# Patient Record
Sex: FEMALE | Race: BLACK OR AFRICAN AMERICAN | Employment: OTHER | URBAN - METROPOLITAN AREA
[De-identification: names, ages, dates, MRNs, and addresses within clinical notes are randomized per-mention and may not be internally consistent; named-entity substitution may affect disease eponyms.]

---

## 2017-01-10 ENCOUNTER — OFFICE VISIT (OUTPATIENT)
Dept: FAMILY MEDICINE CLINIC | Age: 67
End: 2017-01-10

## 2017-01-10 VITALS
HEIGHT: 65 IN | OXYGEN SATURATION: 97 % | SYSTOLIC BLOOD PRESSURE: 170 MMHG | DIASTOLIC BLOOD PRESSURE: 90 MMHG | RESPIRATION RATE: 16 BRPM | HEART RATE: 70 BPM | TEMPERATURE: 97.7 F | WEIGHT: 170.13 LBS | BODY MASS INDEX: 28.35 KG/M2

## 2017-01-10 DIAGNOSIS — F41.9 ANXIETY: ICD-10-CM

## 2017-01-10 DIAGNOSIS — I10 ESSENTIAL HYPERTENSION: ICD-10-CM

## 2017-01-10 DIAGNOSIS — Z23 ENCOUNTER FOR IMMUNIZATION: ICD-10-CM

## 2017-01-10 DIAGNOSIS — Z00.00 WELL ADULT EXAM: Primary | ICD-10-CM

## 2017-01-10 RX ORDER — ESCITALOPRAM OXALATE 10 MG/1
10 TABLET ORAL DAILY
Qty: 30 TAB | Refills: 5 | Status: SHIPPED | OUTPATIENT
Start: 2017-01-10 | End: 2017-10-19 | Stop reason: SDUPTHER

## 2017-01-10 RX ORDER — AZITHROMYCIN 250 MG/1
TABLET, FILM COATED ORAL
Qty: 6 TAB | Refills: 0 | Status: SHIPPED | OUTPATIENT
Start: 2017-01-10 | End: 2017-01-31 | Stop reason: ALTCHOICE

## 2017-01-10 NOTE — PROGRESS NOTES
1. Have you been to the ER, urgent care clinic since your last visit? Hospitalized since your last visit? No    2. Have you seen or consulted any other health care providers outside of the [address] [place] [address] since your last visit? Include any pap smears or colon screening.  No    Chief Complaint   Patient presents with    Complete Physical    Labs

## 2017-01-10 NOTE — MR AVS SNAPSHOT
Visit Information Date & Time Provider Department Dept. Phone Encounter #  
 1/10/2017 10:00 AM Rodolfo Campbell NP [address] [facility] [phone] [id number] Follow-up Instructions Return in about 3 weeks (around 1/31/2017) for med check. Upcoming Health Maintenance Date Due COLONOSCOPY 9/25/1967 Pneumococcal 65+ Low/Medium Risk (2 of 2 - PCV13) 11/10/2015 GLAUCOMA SCREENING Q2Y 5/14/2016 INFLUENZA AGE 9 TO ADULT 8/1/2016 MEDICARE YEARLY EXAM 12/1/2016 BREAST CANCER SCRN MAMMOGRAM 12/8/2016 DTaP/Tdap/Td series (2 - Td) 11/10/2024 Allergies as of 1/10/2017  Review Complete On: 1/10/2017 By: Carina Hunt LPN Severity Noted Reaction Type Reactions Pcn [Penicillins]  07/02/2010   Side Effect Unknown (comments) Current Immunizations  Reviewed on 12/1/2015 Name Date Influenza High Dose Vaccine PF  Incomplete Influenza Vaccine 11/17/2014 Influenza Vaccine (Quad) PF 12/1/2015 Pneumococcal Polysaccharide (PPSV-23) 11/10/2014 Tdap 11/10/2014 Not reviewed this visit You Were Diagnosed With   
  
 Codes Comments Well adult exam    -  Primary ICD-10-CM: Z00.00 ICD-9-CM: V70.0 Essential hypertension     ICD-10-CM: I10 
ICD-9-CM: 401.9 Anxiety     ICD-10-CM: F41.9 ICD-9-CM: 300.00 Encounter for immunization     ICD-10-CM: O10 ICD-9-CM: V03.89 Vitals BP Pulse Temp Resp Height(growth percentile) Weight(growth percentile) 170/90 70 97.7 °F (36.5 °C) (Oral) 16 5' 5\" (1.651 m) 170 lb 2 oz (77.2 kg) SpO2 BMI OB Status Smoking Status 97% 28.31 kg/m2 Postmenopausal Current Every Day Smoker Vitals History BMI and BSA Data Body Mass Index Body Surface Area  
 28.31 kg/m 2 1.88 m 2 Preferred Pharmacy Pharmacy Name Phone [id number] [address] [phone] Your Updated Medication List  
  
   
 This list is accurate as of: 1/10/17 10:45 AM.  Always use your most recent med list.  
  
  
  
  
 acyclovir 400 mg tablet Commonly known as:  ZOVIRAX TAKE 1 TABLET BY MOUTH THREE (3) TIMES DAILY FOR 5 DAYS. aspirin 325 mg tablet Commonly known as:  ASPIRIN Take 1 Tab by mouth daily. azithromycin 250 mg tablet Commonly known as:  Debera Shaper Take two tablets today then one tablet daily Calcium-Cholecalciferol (D3) 600 mg(1,500mg) -400 unit Cap Take 1 Tab by mouth daily. escitalopram oxalate 10 mg tablet Commonly known as:  Arty Montezuma Take 1 Tab by mouth daily. oxyCODONE-acetaminophen 5-325 mg per tablet Commonly known as:  PERCOCET Take 1 Tab by mouth every four (4) hours as needed for Pain. Max Daily Amount: 6 Tabs. pneumococcal 13 kristopher conj dip 0.5 mL Syrg injection Commonly known as:  PREVNAR-13  
0.5 mL by IntraMUSCular route once for 1 dose. promethazine 25 mg tablet Commonly known as:  PHENERGAN Take 1 Tab by mouth every six (6) hours as needed for Nausea. Prescriptions Printed Refills  
 pneumococcal 13 kristopher conj dip (PREVNAR-13) 0.5 mL syrg injection 0 Si.5 mL by IntraMUSCular route once for 1 dose. Class: Print Route: IntraMUSCular Prescriptions Sent to Pharmacy Refills  
 azithromycin (ZITHROMAX Z-RADHA) 250 mg tablet 0 Sig: Take two tablets today then one tablet daily Class: Normal  
 Pharmacy: [facility] #[id number] Ph #: [phone]  
 escitalopram oxalate (LEXAPRO) 10 mg tablet 5 Sig: Take 1 Tab by mouth daily. Class: Normal  
 Pharmacy: [id number]- [place], [address] [facility] Ph #: [phone] Route: Oral  
  
We Performed the Following ADMIN INFLUENZA VIRUS VAC [ HCPCS] CBC WITH AUTOMATED DIFF [59720 CPT(R)] INFLUENZA VIRUS VACCINE, HIGH DOSE SEASONAL, PRESERVATIVE FREE [68098 CPT(R)] LIPID PANEL [33026 CPT(R)] METABOLIC PANEL, COMPREHENSIVE [62905 CPT(R)] TSH 3RD GENERATION [35529 CPT(R)] Follow-up Instructions Return in about 3 weeks (around 1/31/2017) for med check. Please provide this summary of care documentation to your next provider. Your primary care clinician is listed as Rodney Seay. If you have any questions after today's visit, please call [phone].

## 2017-01-10 NOTE — PROGRESS NOTES
This is a Subsequent Medicare Annual Wellness Visit providing Personalized Prevention Plan Services (PPPS) (Performed 12 months after initial AWV and PPPS )  I have reviewed the patient's medical history in detail and updated the computerized patient record. She is also here for her fasting labs related to her chronic medical conditions  She had a visit with the [facility] nurse and needs to get flu shot and prevnar 13 vaccines to be current  Has been having increase in anxiety and bp also noted to be high      History     Past Medical History   Diagnosis Date    Asthma      in 25s - no longer a problem    Ill-defined condition      Hepatitis C    Liver disease      hep c--had treatment, states no longer present    Nausea & vomiting       Past Surgical History   Procedure Laterality Date    Hx orthopaedic       foot (right)    Vascular surgery procedure unlist       left leg varicose vein stripping     Current Outpatient Prescriptions   Medication Sig Dispense Refill    pneumococcal 13 kristopher conj dip (PREVNAR-13) 0.5 mL syrg injection 0.5 mL by IntraMUSCular route once for 1 dose. 0.5 mL 0    acyclovir (ZOVIRAX) 400 mg tablet TAKE 1 TABLET BY MOUTH THREE (3) TIMES DAILY FOR 5 DAYS. 15 Tab 1    aspirin (ASPIRIN) 325 mg tablet Take 1 Tab by mouth daily. 21 Tab 0    Calcium-Cholecalciferol, D3, 600 mg(1,500mg) -400 unit cap Take 1 Tab by mouth daily.  oxyCODONE-acetaminophen (PERCOCET) 5-325 mg per tablet Take 1 Tab by mouth every four (4) hours as needed for Pain. Max Daily Amount: 6 Tabs. 20 Tab 0    promethazine (PHENERGAN) 25 mg tablet Take 1 Tab by mouth every six (6) hours as needed for Nausea.  30 Tab 0     Allergies   Allergen Reactions    Pcn [Penicillins] Unknown (comments)     Family History   Problem Relation Age of Onset    Elevated Lipids Mother     Cancer Father      LUNG AND THROAT    Parkinson's Disease Father      Social History   Substance Use Topics    Smoking status: Current Every Day Smoker     Packs/day: 0.10    Smokeless tobacco: Never Used      Comment: smokes 3-4 cigarettes per day x 25 years    Alcohol use Yes      Comment: about 5 drinks per week     Patient Active Problem List   Diagnosis Code    Chronic hepatitis C ([facility] B18.2    Osteopenia M85.80    Closed fracture of right patella S82.001A       Depression Risk Factor Screening:     PHQ 2 / 9, over the last two weeks 1/10/2017   Little interest or pleasure in doing things Several days   Feeling down, depressed or hopeless More than half the days   Total Score PHQ 2 3   Trouble falling or staying asleep, or sleeping too much Nearly every day   Feeling tired or having little energy Nearly every day   Poor appetite or overeating Several days   Feeling bad about yourself - or that you are a failure or have let yourself or your family down Nearly every day   Trouble concentrating on things such as school, work, reading or watching TV Nearly every day   Moving or speaking so slowly that other people could have noticed; or the opposite being so fidgety that others notice Not at all   Thoughts of being better off dead, or hurting yourself in some way Not at all   PHQ 9 Score 16   How difficult have these problems made it for you to do your work, take care of your home and get along with others Very difficult     Alcohol Risk Factor Screening: On any occasion during the past 3 months, have you had more than 3 drinks containing alcohol? Yes    Do you average more than 7 drinks per week? Yes      Functional Ability and Level of Safety:     Hearing Loss   none    Activities of Daily Living   Self-care. Requires assistance with: no ADLs    Fall Risk     Fall Risk Assessment, last 12 mths 1/10/2017   Able to walk? Yes   Fall in past 12 months? Yes   Fall with injury?  Yes   Number of falls in past 12 months 1   Fall Risk Score 2     Abuse Screen   Patient is not abused    Review of Systems   A comprehensive review of systems was negative except for that written in the HPI. Physical Examination     Evaluation of Cognitive Function:  Mood/affect:  happy  Appearance: age appropriate  Family member/caregiver input: concerns about memory    Visit Vitals    /90    Pulse 70    Temp 97.7 °F (36.5 °C) (Oral)    Resp 16    Ht 5' 5\" (1.651 m)    Wt 170 lb 2 oz (77.2 kg)    SpO2 97%    BMI 28.31 kg/m2     General appearance: alert, cooperative, no distress, appears stated age  Lungs: clear to auscultation bilaterally  Heart: regular rate and rhythm, S1, S2 normal, no murmur, click, rub or gallop  Extremities: extremities normal, atraumatic, no cyanosis or edema    Patient Care Team:  Kathy Reeder NP as PCP - General (Family Practice)  Grey Wagner LPN as Ambulatory Care Navigator    Advice/Referrals/Counseling   Education and counseling provided:  Are appropriate based on today's review and evaluation  Pneumococcal Vaccine  Influenza Vaccine    Assessment/Plan     Encounter Diagnoses   Name Primary?  Well adult exam Yes    Essential hypertension     Anxiety     Encounter for immunization      Orders Placed This Encounter    Influenza virus vaccine (FLUZONE HIGH-DOSE) 65 years and older    CBC WITH AUTOMATED DIFF    METABOLIC PANEL, COMPREHENSIVE    LIPID PANEL    TSH 3RD GENERATION    pneumococcal 13 kristopher conj dip (PREVNAR-13) 0.5 mL syrg injection    azithromycin (ZITHROMAX Z-RADHA) 250 mg tablet    escitalopram oxalate (LEXAPRO) 10 mg tablet   . Follow-up Disposition:  Return in about 3 weeks (around 1/31/2017) for med check. I have discussed the diagnosis with the patient and the intended plan as seen in the above orders. The patient has received an after-visit summary and questions were answered concerning future plans. Patient conveyed understanding of the plan at the time of the visit.     Kathy Reeder, MSN, ANP  1/10/2017

## 2017-01-11 LAB
ALBUMIN SERPL-MCNC: 4.6 G/DL (ref 3.6–4.8)
ALBUMIN/GLOB SERPL: 1.5 {RATIO} (ref 1.1–2.5)
ALP SERPL-CCNC: 58 IU/L (ref 39–117)
ALT SERPL-CCNC: 13 IU/L (ref 0–32)
AST SERPL-CCNC: 21 IU/L (ref 0–40)
BASOPHILS # BLD AUTO: 0 X10E3/UL (ref 0–0.2)
BASOPHILS NFR BLD AUTO: 0 %
BILIRUB SERPL-MCNC: 0.4 MG/DL (ref 0–1.2)
BUN SERPL-MCNC: 10 MG/DL (ref 8–27)
BUN/CREAT SERPL: 17 (ref 11–26)
CALCIUM SERPL-MCNC: 9.7 MG/DL (ref 8.7–10.3)
CHLORIDE SERPL-SCNC: 101 MMOL/L (ref 96–106)
CHOLEST SERPL-MCNC: 213 MG/DL (ref 100–199)
CO2 SERPL-SCNC: 20 MMOL/L (ref 18–29)
CREAT SERPL-MCNC: 0.58 MG/DL (ref 0.57–1)
EOSINOPHIL # BLD AUTO: 0.4 X10E3/UL (ref 0–0.4)
EOSINOPHIL NFR BLD AUTO: 6 %
ERYTHROCYTE [DISTWIDTH] IN BLOOD BY AUTOMATED COUNT: 13.5 % (ref 12.3–15.4)
GLOBULIN SER CALC-MCNC: 3 G/DL (ref 1.5–4.5)
GLUCOSE SERPL-MCNC: 95 MG/DL (ref 65–99)
HCT VFR BLD AUTO: 43.2 % (ref 34–46.6)
HDLC SERPL-MCNC: 54 MG/DL
HGB BLD-MCNC: 14.1 G/DL (ref 11.1–15.9)
IMM GRANULOCYTES # BLD: 0 X10E3/UL (ref 0–0.1)
IMM GRANULOCYTES NFR BLD: 0 %
INTERPRETATION, 910389: NORMAL
LDLC SERPL CALC-MCNC: 145 MG/DL (ref 0–99)
LYMPHOCYTES # BLD AUTO: 2.1 X10E3/UL (ref 0.7–3.1)
LYMPHOCYTES NFR BLD AUTO: 30 %
MCH RBC QN AUTO: 31.2 PG (ref 26.6–33)
MCHC RBC AUTO-ENTMCNC: 32.6 G/DL (ref 31.5–35.7)
MCV RBC AUTO: 96 FL (ref 79–97)
MONOCYTES # BLD AUTO: 0.5 X10E3/UL (ref 0.1–0.9)
MONOCYTES NFR BLD AUTO: 8 %
NEUTROPHILS # BLD AUTO: 3.9 X10E3/UL (ref 1.4–7)
NEUTROPHILS NFR BLD AUTO: 56 %
PLATELET # BLD AUTO: 180 X10E3/UL (ref 150–379)
POTASSIUM SERPL-SCNC: 5.1 MMOL/L (ref 3.5–5.2)
PROT SERPL-MCNC: 7.6 G/DL (ref 6–8.5)
RBC # BLD AUTO: 4.52 X10E6/UL (ref 3.77–5.28)
SODIUM SERPL-SCNC: 142 MMOL/L (ref 134–144)
TRIGL SERPL-MCNC: 71 MG/DL (ref 0–149)
TSH SERPL DL<=0.005 MIU/L-ACNC: 0.51 UIU/ML (ref 0.45–4.5)
VLDLC SERPL CALC-MCNC: 14 MG/DL (ref 5–40)
WBC # BLD AUTO: 7 X10E3/UL (ref 3.4–10.8)

## 2017-01-15 NOTE — PROGRESS NOTES
Let her know given her heart history she needs to be on a chol med. I will send to pharm on file if agree and recheck 3 mo fasting.  Samuel Laurent

## 2017-01-17 RX ORDER — ATORVASTATIN CALCIUM 20 MG/1
20 TABLET, FILM COATED ORAL DAILY
Qty: 30 TAB | Refills: 5 | Status: SHIPPED | OUTPATIENT
Start: 2017-01-17 | End: 2017-10-19 | Stop reason: SDUPTHER

## 2017-01-17 NOTE — PROGRESS NOTES
Pt notified (confirmed x 3). Patient verbalized understanding. Pt in agreement to start new cholesterol med (confirmed pharm on file).

## 2017-01-31 ENCOUNTER — OFFICE VISIT (OUTPATIENT)
Dept: FAMILY MEDICINE CLINIC | Age: 67
End: 2017-01-31

## 2017-01-31 VITALS
OXYGEN SATURATION: 99 % | TEMPERATURE: 98.1 F | BODY MASS INDEX: 27.51 KG/M2 | DIASTOLIC BLOOD PRESSURE: 84 MMHG | SYSTOLIC BLOOD PRESSURE: 160 MMHG | HEIGHT: 65 IN | HEART RATE: 77 BPM | RESPIRATION RATE: 16 BRPM | WEIGHT: 165.13 LBS

## 2017-01-31 DIAGNOSIS — R41.3 MEMORY LOSS: ICD-10-CM

## 2017-01-31 DIAGNOSIS — B00.9 HSV-2 (HERPES SIMPLEX VIRUS 2) INFECTION: ICD-10-CM

## 2017-01-31 DIAGNOSIS — N76.0 ACUTE VAGINITIS: Primary | ICD-10-CM

## 2017-01-31 RX ORDER — ACYCLOVIR 400 MG/1
TABLET ORAL
Qty: 30 TAB | Refills: 2 | Status: SHIPPED | OUTPATIENT
Start: 2017-01-31 | End: 2017-04-04 | Stop reason: ALTCHOICE

## 2017-01-31 RX ORDER — FLUCONAZOLE 150 MG/1
150 TABLET ORAL DAILY
Qty: 2 TAB | Refills: 0 | Status: SHIPPED | OUTPATIENT
Start: 2017-01-31 | End: 2017-02-01

## 2017-01-31 NOTE — MR AVS SNAPSHOT
Visit Information Date & Time Provider Department Dept. Phone Encounter #  
 1/31/2017 10:15 AM Dian Real, NP [address] [facility] [phone] [id number] Upcoming Health Maintenance Date Due COLONOSCOPY 9/25/1968 Pneumococcal 65+ Low/Medium Risk (2 of 2 - PCV13) 11/10/2015 GLAUCOMA SCREENING Q2Y 5/14/2016 BREAST CANCER SCRN MAMMOGRAM 12/8/2016 MEDICARE YEARLY EXAM 1/11/2018 DTaP/Tdap/Td series (2 - Td) 11/10/2024 Allergies as of 1/31/2017  Review Complete On: 1/31/2017 By: Xiomara Hermosillo LPN Severity Noted Reaction Type Reactions Pcn [Penicillins]  07/02/2010   Side Effect Unknown (comments) Current Immunizations  Reviewed on 12/1/2015 Name Date Influenza High Dose Vaccine PF 1/10/2017 Influenza Vaccine 11/17/2014 Influenza Vaccine (Quad) PF 12/1/2015 Pneumococcal Polysaccharide (PPSV-23) 11/10/2014 Tdap 11/10/2014 Not reviewed this visit You Were Diagnosed With   
  
 Codes Comments Acute vaginitis    -  Primary ICD-10-CM: N76.0 ICD-9-CM: 616.10 HSV-2 (herpes simplex virus 2) infection     ICD-10-CM: B00.9 ICD-9-CM: 054.9 Memory loss     ICD-10-CM: R41.3 ICD-9-CM: 780.93 Vitals BP Pulse Temp Resp Height(growth percentile) Weight(growth percentile) 160/84 77 98.1 °F (36.7 °C) (Oral) 16 5' 5\" (1.651 m) 165 lb 2 oz (74.9 kg) SpO2 BMI OB Status Smoking Status 99% 27.48 kg/m2 Postmenopausal Current Every Day Smoker Vitals History BMI and BSA Data Body Mass Index Body Surface Area  
 27.48 kg/m 2 1.85 m 2 Preferred Pharmacy Pharmacy Name Phone [id number] [place], [address] [place] [address] [phone] Your Updated Medication List  
  
   
This list is accurate as of: 1/31/17 11:00 AM.  Always use your most recent med list.  
  
  
  
  
 acyclovir 400 mg tablet Commonly known as:  ZOVIRAX TAKE 1 TABLET BY MOUTH THREE (3) TIMES DAILY FOR 5 DAYS. aspirin 325 mg tablet Commonly known as:  ASPIRIN Take 1 Tab by mouth daily. atorvastatin 20 mg tablet Commonly known as:  LIPITOR Take 1 Tab by mouth daily. Calcium-Cholecalciferol (D3) 600 mg(1,500mg) -400 unit Cap Take 1 Tab by mouth daily. escitalopram oxalate 10 mg tablet Commonly known as:  Clem Emerald Take 1 Tab by mouth daily. fluconazole 150 mg tablet Commonly known as:  DIFLUCAN Take 1 Tab by mouth daily for 1 day. Repeat 4 days  
  
 oxyCODONE-acetaminophen 5-325 mg per tablet Commonly known as:  PERCOCET Take 1 Tab by mouth every four (4) hours as needed for Pain. Max Daily Amount: 6 Tabs. promethazine 25 mg tablet Commonly known as:  PHENERGAN Take 1 Tab by mouth every six (6) hours as needed for Nausea. Prescriptions Sent to Pharmacy Refills  
 fluconazole (DIFLUCAN) 150 mg tablet 0 Sig: Take 1 Tab by mouth daily for 1 day. Repeat 4 days Class: Normal  
 Pharmacy: [facility]/pharmacy #[id number] Ph #: [phone] Route: Oral  
 acyclovir (ZOVIRAX) 400 mg tablet 2 Sig: TAKE 1 TABLET BY MOUTH THREE (3) TIMES DAILY FOR 5 DAYS. Class: Normal  
 Pharmacy: [facility]/pharmacy #[id number] Ph #: [phone] We Performed the Following REFERRAL TO NEUROLOGY [EZL98 Custom] Comments:  
 Please evaluate patient for memory loss. Referral Information Referral ID Referred By Referred To  
  
 [id number] Tj COPE MD [address] [facility] [address] [facility], [address] [facility] Phone: [phone] Fax: [phone] Visits Status Start Date End Date [address] [facility] 1/31/17 1/31/18  If your referral has a status of pending review or denied, additional information will be sent to support the outcome of this decision. Introducing [facility]! Dear Ange Steward: Thank you for requesting a [employer] account. Our records indicate that you have previously registered for a [employer] account but its currently inactive. Please call our [employer] support line at [phone]. Additional Information If you have questions, please visit the Frequently Asked Questions section of the [email or web address] website at [email or web address] Remember, [email or web address] is NOT to be used for urgent needs. For medical emergencies, dial 911. Now available from your iPhone and Android! Please provide this summary of care documentation to your next provider. Your primary care clinician is listed as Elias Camarillo. If you have any questions after today's visit, please call [phone].

## 2017-01-31 NOTE — PROGRESS NOTES
1. Have you been to the ER, urgent care clinic since your last visit? Hospitalized since your last visit? No    2. Have you seen or consulted any other health care providers outside of the [address] [place] [address] since your last visit? Include any pap smears or colon screening. No    Chief Complaint   Patient presents with    Follow-up     3 wk f/u, med ck    Vaginal Discharge     & vaginal odor since finishing abx     Pt states since finishing her abx she has had vaginal discharge & odor.

## 2017-01-31 NOTE — PROGRESS NOTES
HISTORY OF PRESENT ILLNESS  Arleth Brooks is a 78 y.o. female. HPI  Here for follow up URI  Doing much better with breathing, however now has yeast infection  Also requesting refill of her valtrex for hsv II outbreaks    Wants to see Neuro  Having severe issue with memory loss, did have some word finding difficulty in office today  No pmh stroke or head injury    ROS  A comprehensive review of system was obtained and negative except findings in the HPI    Visit Vitals    /84    Pulse 77    Temp 98.1 °F (36.7 °C) (Oral)    Resp 16    Ht 5' 5\" (1.651 m)    Wt 165 lb 2 oz (74.9 kg)    SpO2 99%    BMI 27.48 kg/m2     Physical Exam   Constitutional: She is oriented to person, place, and time. She appears well-developed and well-nourished. Neck: No JVD present. Cardiovascular: Normal rate, regular rhythm and intact distal pulses. Exam reveals no gallop and no friction rub. No murmur heard. Pulmonary/Chest: Effort normal and breath sounds normal. No respiratory distress. She has no wheezes. Musculoskeletal: She exhibits no edema. Neurological: She is alert and oriented to person, place, and time. Skin: Skin is warm. Nursing note and vitals reviewed. ASSESSMENT and PLAN  Encounter Diagnoses   Name Primary?  Acute vaginitis Yes    HSV-2 (herpes simplex virus 2) infection     Memory loss      Orders Placed This Encounter    REFERRAL TO NEUROLOGY    fluconazole (DIFLUCAN) 150 mg tablet    acyclovir (ZOVIRAX) 400 mg tablet     Given referral to Neuro  Refilled zovirax and diflucan for yeast infection  Follow up prn    I have discussed the diagnosis with the patient and the intended plan as seen in the above orders. The patient has received an after-visit summary and questions were answered concerning future plans. Patient conveyed understanding of the plan at the time of the visit.     Quin Acosta, MSN, ANP  1/31/2017

## 2017-02-14 ENCOUNTER — TELEPHONE (OUTPATIENT)
Dept: FAMILY MEDICINE CLINIC | Age: 67
End: 2017-02-14

## 2017-02-14 RX ORDER — METRONIDAZOLE 500 MG/1
500 TABLET ORAL 2 TIMES DAILY
Qty: 14 TAB | Refills: 0 | Status: SHIPPED | OUTPATIENT
Start: 2017-02-14 | End: 2017-02-21

## 2017-02-14 NOTE — TELEPHONE ENCOUNTER
Pt is still experiencing sxs of a yeast infection, pt states she still has the discharge, no itching, pt wants to know if she can get a rx for something other than the diflucan. CB# [phone]. Pharmacy on file is correct.

## 2017-03-20 ENCOUNTER — OFFICE VISIT (OUTPATIENT)
Dept: NEUROLOGY | Age: 67
End: 2017-03-20

## 2017-03-20 ENCOUNTER — HOSPITAL ENCOUNTER (OUTPATIENT)
Dept: MAMMOGRAPHY | Age: 67
Discharge: HOME | End: 2017-03-20
Attending: NURSE PRACTITIONER
Payer: MEDICARE

## 2017-03-20 VITALS
WEIGHT: 166.6 LBS | HEART RATE: 78 BPM | SYSTOLIC BLOOD PRESSURE: 174 MMHG | OXYGEN SATURATION: 99 % | BODY MASS INDEX: 27.72 KG/M2 | DIASTOLIC BLOOD PRESSURE: 90 MMHG

## 2017-03-20 DIAGNOSIS — G31.84 MILD COGNITIVE IMPAIRMENT: Primary | ICD-10-CM

## 2017-03-20 DIAGNOSIS — Z12.31 VISIT FOR SCREENING MAMMOGRAM: ICD-10-CM

## 2017-03-20 PROCEDURE — 77067 SCR MAMMO BI INCL CAD: CPT

## 2017-03-20 NOTE — PATIENT INSTRUCTIONS
[address] [facility]   Statement to Patients  April 1, 2014      In an effort to ensure the large volume of patient prescription refills is processed in the most efficient and expeditious manner, we are asking our patients to assist us by calling your Pharmacy for all prescription refills, this will include also your  Mail Order Pharmacy. The pharmacy will contact our office electronically to continue the refill process. Please do not wait until the last minute to call your pharmacy. We need at least 48 hours (2days) to fill prescriptions. We also encourage you to call your pharmacy before going to  your prescription to make sure it is ready. With regard to controlled substance prescription refill requests (narcotic refills) that need to be picked up at our office, we ask your cooperation by providing us with at least 72 hours (3days) notice that you will need a refill. We will not refill narcotic prescription refill requests after 4:00pm on any weekday, Monday through Thursday, or after 2:00pm on Fridays, or on the weekends. We encourage everyone to explore another way of getting your prescription refill request processed using [employer], our patient web portal through our electronic medical record system. [employer] is an efficient and effective way to communicate your medication request directly to the office and  downloadable as an sai on your smart phone . [employer] also features a review functionality that allows you to view your medication list as well as leave messages for your physician. Are you ready to get connected? If so please review the attatched instructions or speak to any of our staff to get you set up right away! Thank you so much for your cooperation. Should you have any questions please contact our Practice Administrator.     The Physicians and Staff,  [facility]

## 2017-03-20 NOTE — PROGRESS NOTES
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 HPI:     For the past 1 yr, patient noted memory issues described as forgetting her car payment, problem remembering names and faces, forgetting schedules, misplaces her boots, able to drive, able to cook, but still independent in all ADL'S. (-) hallucinations  (-) loss of hygiene  (+) loss of appetite  (+) word finding difficulty    (+) problem staying asleep - sleeps only 5 to 6 hrs. Admits to depression - withdrawn, irritable - placed on Lexapro 10 mg with improvement of mood    When she was in her 20's patient was having passing out spells, placed in Dilantin for couple of years for (?) seizure. EEG and CT head unknown results    Review of Systems   Constitutional: Negative for chills, fever and weight loss. All other systems reviewed and are negative.         Past Medical Hx  Past Medical History:   Diagnosis Date    Arthritis     Asthma     in 25s - no longer a problem    Hypertension     Ill-defined condition     Hepatitis C    Liver disease     hep c--had treatment, states no longer present    Nausea & vomiting     Seizures (HCC)    Hypercholesterol  R knee surgery    Social Hx  Social History     Social History    Marital status: SINGLE     Spouse name: N/A    Number of children: N/A    Years of education: N/A     Social History Main Topics    Smoking status: Current Every Day Smoker     Packs/day: 0.10    Smokeless tobacco: Never Used      Comment: smokes 3-4 cigarettes per day x 25 years    Alcohol use Yes      Comment: about 5 drinks per week    Drug use: No    Sexual activity: Yes     Partners: Male      Comment: history of IV drug abuse     Other Topics Concern    None     Social History Narrative   Finished business school      Family Hx  Family History   Problem Relation Age of Onset    Elevated Lipids Mother     Cancer Father      LUNG AND THROAT    Parkinson's Disease Father     Breast Cancer Paternal Aunt        ALLERGIES  Allergies   Allergen Reactions    Pcn [Penicillins] Unknown (comments)       CURRENT MEDS  Current Outpatient Prescriptions   Medication Sig Dispense Refill    acyclovir (ZOVIRAX) 400 mg tablet TAKE 1 TABLET BY MOUTH THREE (3) TIMES DAILY FOR 5 DAYS. 30 Tab 2    atorvastatin (LIPITOR) 20 mg tablet Take 1 Tab by mouth daily. 30 Tab 5    escitalopram oxalate (LEXAPRO) 10 mg tablet Take 1 Tab by mouth daily. 30 Tab 5    aspirin (ASPIRIN) 325 mg tablet Take 1 Tab by mouth daily. 21 Tab 0    Calcium-Cholecalciferol, D3, 600 mg(1,500mg) -400 unit cap Take 1 Tab by mouth daily.  oxyCODONE-acetaminophen (PERCOCET) 5-325 mg per tablet Take 1 Tab by mouth every four (4) hours as needed for Pain. Max Daily Amount: 6 Tabs. 20 Tab 0    promethazine (PHENERGAN) 25 mg tablet Take 1 Tab by mouth every six (6) hours as needed for Nausea. 30 Tab 0           PREVIOUS WORKUP: (reviewed)  IMAGING:    CT Results (recent): MRI Results (recent):  No results found for this or any previous visit. IR Results (recent):  No results found for this or any previous visit. VAS/US Results (recent):  No results found for this or any previous visit. LABS (reviewed)  Results for orders placed or performed in visit on 01/10/17   CBC WITH AUTOMATED DIFF   Result Value Ref Range    WBC 7.0 3.4 - 10.8 x10E3/uL    RBC 4.52 3.77 - 5.28 x10E6/uL    HGB 14.1 11.1 - 15.9 g/dL    HCT 43.2 34.0 - 46.6 %    MCV 96 79 - 97 fL    MCH 31.2 26.6 - 33.0 pg    MCHC 32.6 31.5 - 35.7 g/dL    RDW 13.5 12.3 - 15.4 %    PLATELET 325 154 - 918 x10E3/uL    NEUTROPHILS 56 %    Lymphocytes 30 %    MONOCYTES 8 %    EOSINOPHILS 6 %    BASOPHILS 0 %    ABS.  NEUTROPHILS 3.9 1.4 - 7.0 x10E3/uL    Abs Lymphocytes 2.1 0.7 - 3.1 x10E3/uL    ABS. MONOCYTES 0.5 0.1 - 0.9 x10E3/uL    ABS. EOSINOPHILS 0.4 0.0 - 0.4 x10E3/uL    ABS. BASOPHILS 0.0 0.0 - 0.2 x10E3/uL    IMMATURE GRANULOCYTES 0 %    ABS. IMM. GRANS. 0.0 0.0 - 0.1 H90J9/XR   METABOLIC PANEL, COMPREHENSIVE   Result Value Ref Range    Glucose 95 65 - 99 mg/dL    BUN 10 8 - 27 mg/dL    Creatinine 0.58 0.57 - 1.00 mg/dL    GFR est non-AA 96 >59 mL/min/1.73    GFR est  >59 mL/min/1.73    BUN/Creatinine ratio 17 11 - 26    Sodium 142 134 - 144 mmol/L    Potassium 5.1 3.5 - 5.2 mmol/L    Chloride 101 96 - 106 mmol/L    CO2 20 18 - 29 mmol/L    Calcium 9.7 8.7 - 10.3 mg/dL    Protein, total 7.6 6.0 - 8.5 g/dL    Albumin 4.6 3.6 - 4.8 g/dL    GLOBULIN, TOTAL 3.0 1.5 - 4.5 g/dL    A-G Ratio 1.5 1.1 - 2.5    Bilirubin, total 0.4 0.0 - 1.2 mg/dL    Alk. phosphatase 58 39 - 117 IU/L    AST (SGOT) 21 0 - 40 IU/L    ALT (SGPT) 13 0 - 32 IU/L   LIPID PANEL   Result Value Ref Range    Cholesterol, total 213 (H) 100 - 199 mg/dL    Triglyceride 71 0 - 149 mg/dL    HDL Cholesterol 54 >39 mg/dL    VLDL, calculated 14 5 - 40 mg/dL    LDL, calculated 145 (H) 0 - 99 mg/dL   TSH 3RD GENERATION   Result Value Ref Range    TSH 0.506 0.450 - 4.500 uIU/mL   CVD REPORT   Result Value Ref Range    INTERPRETATION Note        Physical Exam:     Visit Vitals    /90    Pulse 78    Wt 75.6 kg (166 lb 9.6 oz)    SpO2 99%    BMI 27.72 kg/m2     General:  Alert, cooperative, no distress. Head:  Normocephalic, without obvious abnormality, atraumatic. Eyes:  Conjunctivae/corneas clear. Lungs:  Heart:   Non labored breathing  Regular rate and rhythm, no carotid bruits   Abdomen:   Soft, non-distended   Extremities: Extremities normal, atraumatic, no cyanosis or edema. Pulses: 2+ and symmetric all extremities. Skin: Skin color, texture, turgor normal. No rashes or lesions.   Neurologic Exam     Gen: MMSE 30/30 Attention normal             Language: naming, repetition, fluency normal             Memory: intact recent and remote memory  Cranial Nerves:  I: smell Not tested   II: visual fields Full to confrontation   II: pupils Equal, round, reactive to light   II: optic disc No papilledema   III,VII: ptosis none   III,IV,VI: extraocular muscles  Full ROM   V: mastication normal   V: facial light touch sensation  normal   VII: facial muscle function   symmetric   VIII: hearing symmetric   IX: soft palate elevation  normal   XI: trapezius strength  5/5   XI: sternocleidomastoid strength 5/5   XI: neck flexion strength  5/5   XII: tongue  midline     Motor: normal bulk and tone, no tremor              Strength: 5/5 all four extremities  Sensory: intact to LT, PP, vibration, and JPS  Reflexes: 2+ throughout; Down going toes  Coordination: Good FTN and HTS, Romberg negative  Gait: normal gait including tandem            Impression:     Chaparrita Arce is a 78 y.o. female who  has a past medical history of Arthritis; Asthma; Hypertension; Ill-defined condition; Liver disease; Nausea & vomiting; and Seizures ([facility] [address] Hep C - resolved, who for the past 1 yr, patient noted memory issues described as forgetting her car payment, problem remembering names and faces, forgetting schedules, misplaces her boots, able to drive, able to cook, but still independent in all ADLS. MMSE is 30/30 with no evidence of dementia. Consideration includes cognitive impairment due to depression and insomnia. However, patient should be evaluated for nutritional and inflammatory causes of her symptoms. RECOMMENDATIONS  1. Will check Vit B12, Folate, RPR, ESR, STEVE  2. Start Melatonin 5 mg to help with insomnia. 3. Patient was just recently placed on Lexapro for depression. If no improvement, consider switching to Amitriptyline which can also address insomnia  4. Patient to check BP and to let PCP know if it remains elevated  5. Advise to quit smoking  6.  Advise to keep mind active, stay socially engaged, exercise and eat a Mediterranean diet      Ms. Carlos Spence has a reminder for a \"due or due soon\" health maintenance. I have asked that she contact her primary care provider for follow-up on this health maintenance. Follow-up Disposition:  Return if symptoms worsen or fail to improve.         Thank you for the consultation      Manda Dickson MD  Diplomate, American Board of Psychiatry and Neurology  Diplomate, Neuromuscular Medicine  Diplomate, American Board of Electrodiagnostic Medicine    Greater than 50% of time spent counseling patient      CC: Lanette Bryson NP  Fax: None

## 2017-03-20 NOTE — MR AVS SNAPSHOT
Visit Information Date & Time Provider Department Dept. Phone Encounter #  
 3/20/2017  2:00 PM Kala Barahona MD Neurology [facility] [phone] [id number] Follow-up Instructions Return if symptoms worsen or fail to improve. Upcoming Health Maintenance Date Due COLONOSCOPY 9/25/1967 Pneumococcal 65+ Low/Medium Risk (2 of 2 - PCV13) 11/10/2015 GLAUCOMA SCREENING Q2Y 5/14/2016 BREAST CANCER SCRN MAMMOGRAM 12/8/2016 MEDICARE YEARLY EXAM 1/11/2018 DTaP/Tdap/Td series (2 - Td) 11/10/2024 Allergies as of 3/20/2017  Review Complete On: 3/20/2017 By: Kala Barahona MD  
  
 Severity Noted Reaction Type Reactions Pcn [Penicillins]  07/02/2010   Side Effect Unknown (comments) Current Immunizations  Reviewed on 12/1/2015 Name Date Influenza High Dose Vaccine PF 1/10/2017 Influenza Vaccine 11/17/2014 Influenza Vaccine (Quad) PF 12/1/2015 Pneumococcal Polysaccharide (PPSV-23) 11/10/2014 Tdap 11/10/2014 Not reviewed this visit You Were Diagnosed With   
  
 Codes Comments Mild cognitive impairment    -  Primary ICD-10-CM: G31.84 ICD-9-CM: 331.83 Vitals BP Pulse Weight(growth percentile) SpO2 BMI OB Status 174/90 78 166 lb 9.6 oz (75.6 kg) 99% 27.72 kg/m2 Postmenopausal  
 Smoking Status Current Every Day Smoker BMI and BSA Data Body Mass Index Body Surface Area  
 27.72 kg/m 2 1.86 m 2 Preferred Pharmacy Pharmacy Name Phone [id number] [address] [phone] Your Updated Medication List  
  
   
This list is accurate as of: 3/20/17  2:43 PM.  Always use your most recent med list.  
  
  
  
  
 acyclovir 400 mg tablet Commonly known as:  ZOVIRAX TAKE 1 TABLET BY MOUTH THREE (3) TIMES DAILY FOR 5 DAYS. aspirin 325 mg tablet Commonly known as:  ASPIRIN Take 1 Tab by mouth daily. atorvastatin 20 mg tablet Commonly known as:  LIPITOR Take 1 Tab by mouth daily. Calcium-Cholecalciferol (D3) 600 mg(1,500mg) -400 unit Cap Take 1 Tab by mouth daily. escitalopram oxalate 10 mg tablet Commonly known as:  Alfred Goodwin Take 1 Tab by mouth daily. oxyCODONE-acetaminophen 5-325 mg per tablet Commonly known as:  PERCOCET Take 1 Tab by mouth every four (4) hours as needed for Pain. Max Daily Amount: 6 Tabs. promethazine 25 mg tablet Commonly known as:  PHENERGAN Take 1 Tab by mouth every six (6) hours as needed for Nausea. We Performed the Following STEVE, DIRECT, W/REFLEX [id number] CPT(R)] RPR [36551 CPT(R)] SED RATE (ESR) [id number] CPT(R)] VITAMIN B12 & FOLATE [09379 CPT(R)] Follow-up Instructions Return if symptoms worsen or fail to improve. Patient Instructions PRESCRIPTION REFILL POLICY [facility] Statement to Patients April 1, 2014 In an effort to ensure the large volume of patient prescription refills is processed in the most efficient and expeditious manner, we are asking our patients to assist us by calling your Pharmacy for all prescription refills, this will include also your  Mail Order Pharmacy. The pharmacy will contact our office electronically to continue the refill process. Please do not wait until the last minute to call your pharmacy. We need at least 48 hours (2days) to fill prescriptions. We also encourage you to call your pharmacy before going to  your prescription to make sure it is ready. With regard to controlled substance prescription refill requests (narcotic refills) that need to be picked up at our office, we ask your cooperation by providing us with at least 72 hours (3days) notice that you will need a refill. We will not refill narcotic prescription refill requests after 4:00pm on any weekday, Monday through Thursday, or after 2:00pm on Fridays, or on the weekends. We encourage everyone to explore another way of getting your prescription refill request processed using [employer], our patient web portal through our electronic medical record system. [employer] is an efficient and effective way to communicate your medication request directly to the office and  downloadable as an sai on your smart phone . [employer] also features a review functionality that allows you to view your medication list as well as leave messages for your physician. Are you ready to get connected? If so please review the attatched instructions or speak to any of our staff to get you set up right away! Thank you so much for your cooperation. Should you have any questions please contact our Practice Administrator. The Physicians and Staff,  [facility] Introducing hospitals & [facility] SERVICES! [facility] introduces [employer] patient portal. Now you can access parts of your medical record, email your doctor's office, and request medication refills online. 1. In your internet browser, go to [email or web address] 2. Click on the First Time User? Click Here link in the Sign In box. You will see the New Member Sign Up page. 3. Enter your [employer] Access Code exactly as it appears below. You will not need to use this code after youve completed the sign-up process. If you do not sign up before the expiration date, you must request a new code. · [employer] Access Code: [email or web address] Expires: 6/18/2017 12:16 PM 
 
4. Enter the last four digits of your Social Security Number (xxxx) and Date of Birth (mm/dd/yyyy) as indicated and click Submit. You will be taken to the next sign-up page. 5. Create a [employer] ID. This will be your [employer] login ID and cannot be changed, so think of one that is secure and easy to remember. 6. Create a [employer] password. You can change your password at any time. 7. Enter your Password Reset Question and Answer.  This can be used at a later time if you forget your password. 8. Enter your e-mail address. You will receive e-mail notification when new information is available in [address] 9. Click Sign Up. You can now view and download portions of your medical record. 10. Click the Download Summary menu link to download a portable copy of your medical information. If you have questions, please visit the Frequently Asked Questions section of the [employer] website. Remember, [employer] is NOT to be used for urgent needs. For medical emergencies, dial 911. Now available from your iPhone and Android! Please provide this summary of care documentation to your next provider. Your primary care clinician is listed as Sunita Traore. If you have any questions after today's visit, please call [phone].

## 2017-03-21 LAB
ANA SER QL: POSITIVE
CENTROMERE B AB SER-ACNC: <0.2 AI (ref 0–0.9)
CHROMATIN AB SERPL-ACNC: <0.2 AI (ref 0–0.9)
DSDNA AB SER-ACNC: 3 IU/ML (ref 0–9)
ENA JO1 AB SER-ACNC: <0.2 AI (ref 0–0.9)
ENA RNP AB SER-ACNC: 0.2 AI (ref 0–0.9)
ENA SCL70 AB SER-ACNC: <0.2 AI (ref 0–0.9)
ENA SM AB SER-ACNC: <0.2 AI (ref 0–0.9)
ENA SS-A AB SER-ACNC: 5.3 AI (ref 0–0.9)
ENA SS-B AB SER-ACNC: <0.2 AI (ref 0–0.9)
ERYTHROCYTE [SEDIMENTATION RATE] IN BLOOD BY WESTERGREN METHOD: 20 MM/HR (ref 0–40)
FOLATE SERPL-MCNC: 7.5 NG/ML
RPR SER QL: NON REACTIVE
SEE BELOW, 164869: ABNORMAL
VIT B12 SERPL-MCNC: 340 PG/ML (ref 211–946)

## 2017-03-22 ENCOUNTER — TELEPHONE (OUTPATIENT)
Dept: NEUROLOGY | Age: 67
End: 2017-03-22

## 2017-04-04 ENCOUNTER — OFFICE VISIT (OUTPATIENT)
Dept: FAMILY MEDICINE CLINIC | Age: 67
End: 2017-04-04

## 2017-04-04 ENCOUNTER — OFFICE VISIT (OUTPATIENT)
Dept: NEUROLOGY | Age: 67
End: 2017-04-04

## 2017-04-04 VITALS
OXYGEN SATURATION: 99 % | BODY MASS INDEX: 27.96 KG/M2 | DIASTOLIC BLOOD PRESSURE: 94 MMHG | WEIGHT: 168 LBS | HEART RATE: 80 BPM | SYSTOLIC BLOOD PRESSURE: 178 MMHG

## 2017-04-04 VITALS
SYSTOLIC BLOOD PRESSURE: 136 MMHG | TEMPERATURE: 98.1 F | RESPIRATION RATE: 18 BRPM | HEIGHT: 65 IN | WEIGHT: 168 LBS | BODY MASS INDEX: 27.99 KG/M2 | HEART RATE: 77 BPM | OXYGEN SATURATION: 98 % | DIASTOLIC BLOOD PRESSURE: 78 MMHG

## 2017-04-04 DIAGNOSIS — R07.9 CHEST PAIN, UNSPECIFIED TYPE: Primary | ICD-10-CM

## 2017-04-04 DIAGNOSIS — R41.89 COGNITIVE DEFICITS: Primary | ICD-10-CM

## 2017-04-04 DIAGNOSIS — R79.9 ABNORMAL FINDING OF BLOOD CHEMISTRY: ICD-10-CM

## 2017-04-04 DIAGNOSIS — E78.00 ELEVATED CHOLESTEROL: ICD-10-CM

## 2017-04-04 NOTE — MR AVS SNAPSHOT
Visit Information Date & Time Provider Department Dept. Phone Encounter #  
 4/4/2017  7:15 AM Remedios Mallory MD [address] [facility] [phone] [id number] Your Appointments 4/4/2017 10:40 AM  
Follow Up with Mariam Suarez MD  
[facility] Appt Note: Review lab results [place] [address] [address] [phone]  
  
   
 [place] [address] [id number] [address] Upcoming Health Maintenance Date Due COLONOSCOPY 9/25/1967 Pneumococcal 65+ Low/Medium Risk (2 of 2 - PCV13) 11/10/2015 GLAUCOMA SCREENING Q2Y 5/14/2016 MEDICARE YEARLY EXAM 1/11/2018 BREAST CANCER SCRN MAMMOGRAM 3/20/2019 DTaP/Tdap/Td series (2 - Td) 11/10/2024 Allergies as of 4/4/2017  Review Complete On: 4/4/2017 By: Remedios Mallory MD  
  
 Severity Noted Reaction Type Reactions Pcn [Penicillins]  07/02/2010   Side Effect Unknown (comments) Current Immunizations  Reviewed on 12/1/2015 Name Date Influenza High Dose Vaccine PF 1/10/2017 Influenza Vaccine 11/17/2014 Influenza Vaccine (Quad) PF 12/1/2015 Pneumococcal Polysaccharide (PPSV-23) 11/10/2014 Tdap 11/10/2014 Not reviewed this visit You Were Diagnosed With   
  
 Codes Comments Chest pain, unspecified type    -  Primary ICD-10-CM: R07.9 ICD-9-CM: 786.50 Elevated cholesterol     ICD-10-CM: E78.00 ICD-9-CM: 272.0 Abnormal finding of blood chemistry     ICD-10-CM: R79.9 ICD-9-CM: 790.6 Vitals BP Pulse Temp Resp Height(growth percentile) Weight(growth percentile) 136/78 (BP 1 Location: Left arm, BP Patient Position: Sitting) 77 98.1 °F (36.7 °C) (Oral) 18 5' 5\" (1.651 m) 168 lb (76.2 kg) SpO2 BMI OB Status Smoking Status 98% 27.96 kg/m2 Postmenopausal Current Every Day Smoker Vitals History BMI and BSA Data Body Mass Index Body Surface Area  
 27.96 kg/m 2 1.87 m 2 Preferred Pharmacy Pharmacy Name Phone [id number] [address] [place] [address] [phone] Your Updated Medication List  
  
   
This list is accurate as of: 4/4/17  7:51 AM.  Always use your most recent med list.  
  
  
  
  
 aspirin 325 mg tablet Commonly known as:  ASPIRIN Take 1 Tab by mouth daily. atorvastatin 20 mg tablet Commonly known as:  LIPITOR Take 1 Tab by mouth daily. Calcium-Cholecalciferol (D3) 600 mg(1,500mg) -400 unit Cap Take 1 Tab by mouth daily. escitalopram oxalate 10 mg tablet Commonly known as:  Tripp Hane Take 1 Tab by mouth daily. oxyCODONE-acetaminophen 5-325 mg per tablet Commonly known as:  PERCOCET Take 1 Tab by mouth every four (4) hours as needed for Pain. Max Daily Amount: 6 Tabs. We Performed the Following AMB POC EKG ROUTINE W/ 12 LEADS, INTER & REP [98908 CPT(R)] CBC WITH AUTOMATED DIFF [20168 CPT(R)] HEMOGLOBIN A1C WITH EAG [48258 CPT(R)] LIPID PANEL [98570 CPT(R)] METABOLIC PANEL, COMPREHENSIVE [48758 CPT(R)] REFERRAL TO CARDIOLOGY [OMA74 Custom] TSH 3RD GENERATION [29358 CPT(R)] VITAMIN D, 25 HYDROXY [id number] CPT(R)] Referral Information Referral ID Referred By Referred To  
  
 [id number] Alissa SALCEDO MD   
   [place] [address] [address] [place] [address] Phone: [phone] Fax: [phone] Visits Status Start Date End Date 1 New Request 4/4/17 4/4/18 If your referral has a status of pending review or denied, additional information will be sent to support the outcome of this decision. Introducing [facility] SERVICES! Kelly Richards introduces [employer] patient portal. Now you can access parts of your medical record, email your doctor's office, and request medication refills online.    
 
1. In your internet browser, go to [email or web address] 2. Click on the First Time User? Click Here link in the Sign In box. You will see the New Member Sign Up page. 3. Enter your [employer] Access Code exactly as it appears below. You will not need to use this code after youve completed the sign-up process. If you do not sign up before the expiration date, you must request a new code. · [employer] Access Code: [email or web address] Expires: 6/18/2017 12:16 PM 
 
4. Enter the last four digits of your Social Security Number (xxxx) and Date of Birth (mm/dd/yyyy) as indicated and click Submit. You will be taken to the next sign-up page. 5. Create a [employer] ID. This will be your [employer] login ID and cannot be changed, so think of one that is secure and easy to remember. 6. Create a [employer] password. You can change your password at any time. 7. Enter your Password Reset Question and Answer. This can be used at a later time if you forget your password. 8. Enter your e-mail address. You will receive e-mail notification when new information is available in [address] 9. Click Sign Up. You can now view and download portions of your medical record. 10. Click the Download Summary menu link to download a portable copy of your medical information. If you have questions, please visit the Frequently Asked Questions section of the [employer] website. Remember, [employer] is NOT to be used for urgent needs. For medical emergencies, dial 911. Now available from your iPhone and Android! Please provide this summary of care documentation to your next provider. Your primary care clinician is listed as Nicolas Camacho. If you have any questions after today's visit, please call [phone].

## 2017-04-04 NOTE — PROGRESS NOTES
Neurology Progress Note    Patient ID:  Rm Watters  [id number]  67 y.o.  1950      Subjective:   History:  Rm Watters is a 78 y.o. female who  has a past medical history of Arthritis; Asthma; Hypertension; Ill-defined condition; Liver disease; Nausea & vomiting; and Seizures ([facility] [address] Hep C - resolved, who for the past 1 yr, patient noted memory issues described as forgetting her car payment, problem remembering names and faces, forgetting schedules, misplaces her boots, able to drive, able to cook, but still independent in all ADLS. MMSE is 30/30 with no evidence of dementia. Consideration includes cognitive impairment due to depression and insomnia. However, patient should be evaluated for nutritional and inflammatory causes of her symptoms. Since the last time, memory has remain the same. Patient planning to see a cardiologist for chest pain.        Objective:   ROS:  Per HPI-  Otherwise 12 point ROS was negative    Meds:  Current Outpatient Prescriptions on File Prior to Visit   Medication Sig Dispense Refill    atorvastatin (LIPITOR) 20 mg tablet Take 1 Tab by mouth daily. 30 Tab 5    escitalopram oxalate (LEXAPRO) 10 mg tablet Take 1 Tab by mouth daily. 30 Tab 5    aspirin (ASPIRIN) 325 mg tablet Take 1 Tab by mouth daily. 21 Tab 0    Calcium-Cholecalciferol, D3, 600 mg(1,500mg) -400 unit cap Take 1 Tab by mouth daily.  oxyCODONE-acetaminophen (PERCOCET) 5-325 mg per tablet Take 1 Tab by mouth every four (4) hours as needed for Pain. Max Daily Amount: 6 Tabs. 20 Tab 0     No current facility-administered medications on file prior to visit. Imaging:    CT Results (recent): MRI Results (recent):  No results found for this or any previous visit. IR Results (recent):  No results found for this or any previous visit. VAS/US Results (recent):  No results found for this or any previous visit.     Reviewed records in [facility] [place] [facility] today    Lab Review Office Visit on 2017   Component Date Value Ref Range Status    Vitamin B12 2017 340  211 - 946 pg/mL Final    Folate 2017 7.5  >3.0 ng/mL Final    Comment: A serum folate concentration of less than 3.1 ng/mL is  considered to represent clinical deficiency.       Antinuclear Antibodies Direct 2017 Positive* Negative Final    Anti-DNA (DS) Ab, QT 2017 3  0 - 9 IU/mL Final    Comment:                                    Negative      <5                                     Equivocal  5 - 9                                     Positive      >9      RNP Abs 2017 0.2  0.0 - 0.9 AI Final    Smith Abs 2017 <0.2  0.0 - 0.9 AI Final    Scleroderma-70 Ab 2017 <0.2  0.0 - 0.9 AI Final    Sjogren's Anti-SS-A 2017 5.3* 0.0 - 0.9 AI Final    Sjogren's Anti-SS-B 2017 <0.2  0.0 - 0.9 AI Final    Antichromatin Ab 2017 <0.2  0.0 - 0.9 AI Final    Anti-Ayse-1 2017 <0.2  0.0 - 0.9 AI Final    Centromere B Ab 2017 <0.2  0.0 - 0.9 AI Final    See below 2017 Comment   Final    Comment: Autoantibody                       Disease Association  ------------------------------------------------------------                          Condition                  Frequency  ---------------------   ------------------------   ---------  Antinuclear Antibody,    SLE, mixed connective  Direct (STEVE-D)           tissue diseases  ---------------------   ------------------------   ---------  dsDNA                    SLE                        40 - 60%  ---------------------   ------------------------   ---------  Chromatin                Drug induced SLE                90%                           SLE                        48 - 97%  ---------------------   ------------------------   ---------  SSA (Ro)                 SLE                        25 - 35%                           Sjogren's Syndrome         40 - 70%                            Lupus 100%  ---------------------   ------------------------   ---------  SSB (La)                 SLE                                                        10%                           Sjogren's Syndrome              30%  ---------------------   -----------------------    ---------  Sm (anti-Smith)          SLE                        15 - 30%  ---------------------   -----------------------    ---------  RNP                      Mixed Connective Tissue                           Disease                         95%  (U1 nRNP,                SLE                        30 - 50%  anti-ribonucleoprotein)  Polymyositis and/or                           Dermatomyositis                 20%  ---------------------   ------------------------   ---------  Scl-70 (antiDNA          Scleroderma (diffuse)      20 - 35%  topoisomerase)           Crest                           13%  ---------------------   ------------------------   ---------  Ayse-1                     Polymyositis and/or                           Dermatomyositis            20 - 40%  ---------------------   ------------------------   ---------  Centromere B             Scleroderma -                            Crest                           variant                         80%      Sed rate (ESR) 03/20/2017 20  0 - 40 mm/hr Final    RPR 03/20/2017 Non Reactive  Non Reactive Final   Office Visit on 01/10/2017   Component Date Value Ref Range Status    WBC 01/10/2017 7.0  3.4 - 10.8 x10E3/uL Final    RBC 01/10/2017 4.52  3.77 - 5.28 x10E6/uL Final    HGB 01/10/2017 14.1  11.1 - 15.9 g/dL Final    HCT 01/10/2017 43.2  34.0 - 46.6 % Final    MCV 01/10/2017 96  79 - 97 fL Final    MCH 01/10/2017 31.2  26.6 - 33.0 pg Final    MCHC 01/10/2017 32.6  31.5 - 35.7 g/dL Final    RDW 01/10/2017 13.5  12.3 - 15.4 % Final    PLATELET 79/98/4196 859  150 - 379 x10E3/uL Final    NEUTROPHILS 01/10/2017 56  % Final    Lymphocytes 01/10/2017 30  % Final    MONOCYTES 01/10/2017 8  % Final    EOSINOPHILS 01/10/2017 6  % Final    BASOPHILS 01/10/2017 0  % Final    ABS. NEUTROPHILS 01/10/2017 3.9  1.4 - 7.0 x10E3/uL Final    Abs Lymphocytes 01/10/2017 2.1  0.7 - 3.1 x10E3/uL Final    ABS. MONOCYTES 01/10/2017 0.5  0.1 - 0.9 x10E3/uL Final    ABS. EOSINOPHILS 01/10/2017 0.4  0.0 - 0.4 x10E3/uL Final    ABS. BASOPHILS 01/10/2017 0.0  0.0 - 0.2 x10E3/uL Final    IMMATURE GRANULOCYTES 01/10/2017 0  % Final    ABS. IMM. GRANS. 01/10/2017 0.0  0.0 - 0.1 x10E3/uL Final    Glucose 01/10/2017 95  65 - 99 mg/dL Final    BUN 01/10/2017 10  8 - 27 mg/dL Final    Creatinine 01/10/2017 0.58  0.57 - 1.00 mg/dL Final    GFR est non-AA 01/10/2017 96  >59 mL/min/1.73 Final    GFR est AA 01/10/2017 110  >59 mL/min/1.73 Final    BUN/Creatinine ratio 01/10/2017 17  11 - 26 Final    Sodium 01/10/2017 142  134 - 144 mmol/L Final    Potassium 01/10/2017 5.1  3.5 - 5.2 mmol/L Final    Chloride 01/10/2017 101  96 - 106 mmol/L Final    CO2 01/10/2017 20  18 - 29 mmol/L Final    Calcium 01/10/2017 9.7  8.7 - 10.3 mg/dL Final    Protein, total 01/10/2017 7.6  6.0 - 8.5 g/dL Final    Albumin 01/10/2017 4.6  3.6 - 4.8 g/dL Final    GLOBULIN, TOTAL 01/10/2017 3.0  1.5 - 4.5 g/dL Final    A-G Ratio 01/10/2017 1.5  1.1 - 2.5 Final    Bilirubin, total 01/10/2017 0.4  0.0 - 1.2 mg/dL Final    Alk. phosphatase 01/10/2017 58  39 - 117 IU/L Final    AST (SGOT) 01/10/2017 21  0 - 40 IU/L Final    ALT (SGPT) 01/10/2017 13  0 - 32 IU/L Final    Cholesterol, total 01/10/2017 213* 100 - 199 mg/dL Final    Triglyceride 01/10/2017 71  0 - 149 mg/dL Final    HDL Cholesterol 01/10/2017 54  >39 mg/dL Final    VLDL, calculated 01/10/2017 14  5 - 40 mg/dL Final    LDL, calculated 01/10/2017 145* 0 - 99 mg/dL Final    TSH 01/10/2017 0.506  0.450 - 4.500 uIU/mL Final    INTERPRETATION 01/10/2017 Note   Final    Supplement report is available.          Exam:  Visit Vitals    BP (!) 178/94    Pulse 80    Wt 76.2 kg (168 lb)    SpO2 99%    BMI 27.96 kg/m2     Gen: Awake, alert, follows commands  Appropriate appearance, normal speech. Oriented to all spheres. No visual field defect on confrontation exam.  Full eyes movement, with no nystagmus, no diplopia, no ptosis. Normal gag and swallow. All remaining cranial nerves were normal  Motor function: 5/5 in all extremities  Sensory: intact to LT, PP and JPS  DTRs ++ in all extremities, (-) Babinski  Good FTN and HTS   Gait: Normal    Assessment:     1. Cognitive deficits            Plan: 1. Refer to rheumatology for (+) STEVE and SSA  2. Vit B12 supplement to keep it in high normal range to see if cognitive issues improve  3. Patient to check BP and to let PCP know if it remains elevated  4. Advise to quit smoking  5. Continue to keep mind active, stay socially engaged, exercise and eat a Mediterranean diet      Follow-up Disposition:  Return if symptoms worsen or fail to improve.           Brock Cormier MD  Diplomate, American Board of Psychiatry and Neurology  Diplomate, Neuromuscular Medicine  Diplomate, American Board of Electrodiagnostic Medicine

## 2017-04-04 NOTE — MR AVS SNAPSHOT
Visit Information Date & Time Provider Department Dept. Phone Encounter #  
 4/4/2017 10:40 AM Toya Narayanan MD [facility] [phone] [id number] Follow-up Instructions Return if symptoms worsen or fail to improve. Upcoming Health Maintenance Date Due COLONOSCOPY 9/25/1967 Pneumococcal 65+ Low/Medium Risk (2 of 2 - PCV13) 11/10/2015 GLAUCOMA SCREENING Q2Y 5/14/2016 MEDICARE YEARLY EXAM 1/11/2018 BREAST CANCER SCRN MAMMOGRAM 3/20/2019 DTaP/Tdap/Td series (2 - Td) 11/10/2024 Allergies as of 4/4/2017  Review Complete On: 4/4/2017 By: Stepan Montgomery LPN Severity Noted Reaction Type Reactions Pcn [Penicillins]  07/02/2010   Side Effect Unknown (comments) Current Immunizations  Reviewed on 12/1/2015 Name Date Influenza High Dose Vaccine PF 1/10/2017 Influenza Vaccine 11/17/2014 Influenza Vaccine (Quad) PF 12/1/2015 Pneumococcal Polysaccharide (PPSV-23) 11/10/2014 Tdap 11/10/2014 Not reviewed this visit You Were Diagnosed With   
  
 Codes Comments Cognitive deficits    -  Primary ICD-10-CM: R41.89 ICD-9-CM: 294.9 Vitals BP Pulse Weight(growth percentile) SpO2 BMI OB Status (!) 178/94 80 168 lb (76.2 kg) 99% 27.96 kg/m2 Postmenopausal  
 Smoking Status Current Every Day Smoker Vitals History BMI and BSA Data Body Mass Index Body Surface Area  
 27.96 kg/m 2 1.87 m 2 Preferred Pharmacy Pharmacy Name Phone [id number] [address] [phone] Your Updated Medication List  
  
   
This list is accurate as of: 4/4/17 11:02 AM.  Always use your most recent med list.  
  
  
  
  
 aspirin 325 mg tablet Commonly known as:  ASPIRIN Take 1 Tab by mouth daily. atorvastatin 20 mg tablet Commonly known as:  LIPITOR Take 1 Tab by mouth daily. Calcium-Cholecalciferol (D3) 600 mg(1,500mg) -400 unit Cap Take 1 Tab by mouth daily. escitalopram oxalate 10 mg tablet Commonly known as:  Carlotta Peak Take 1 Tab by mouth daily. oxyCODONE-acetaminophen 5-325 mg per tablet Commonly known as:  PERCOCET Take 1 Tab by mouth every four (4) hours as needed for Pain. Max Daily Amount: 6 Tabs. We Performed the Following REFERRAL TO RHEUMATOLOGY [ZRY19 Custom] Comments:  
 Please evaluate patient for (+) STEVE and (+) SSA. Follow-up Instructions Return if symptoms worsen or fail to improve. Referral Information Referral ID Referred By Referred To  
  
 [id number] MICKY Orourke Not Available Visits Status Start Date End Date 1 New Request 4/4/17 4/4/18 If your referral has a status of pending review or denied, additional information will be sent to support the outcome of this decision. Patient Instructions PRESCRIPTION REFILL POLICY [facility] Statement to Patients April 1, 2014 In an effort to ensure the large volume of patient prescription refills is processed in the most efficient and expeditious manner, we are asking our patients to assist us by calling your Pharmacy for all prescription refills, this will include also your  Mail Order Pharmacy. The pharmacy will contact our office electronically to continue the refill process. Please do not wait until the last minute to call your pharmacy. We need at least 48 hours (2days) to fill prescriptions. We also encourage you to call your pharmacy before going to  your prescription to make sure it is ready. With regard to controlled substance prescription refill requests (narcotic refills) that need to be picked up at our office, we ask your cooperation by providing us with at least 72 hours (3days) notice that you will need a refill. We will not refill narcotic prescription refill requests after 4:00pm on any weekday, Monday through Thursday, or after 2:00pm on Fridays, or on the weekends. We encourage everyone to explore another way of getting your prescription refill request processed using Kiko, our patient web portal through our electronic medical record system. Kiko is an efficient and effective way to communicate your medication request directly to the office and  downloadable as an sai on your smart phone . Kiko also features a review functionality that allows you to view your medication list as well as leave messages for your physician. Are you ready to get connected? If so please review the attatched instructions or speak to any of our staff to get you set up right away! Thank you so much for your cooperation. Should you have any questions please contact our Practice Administrator. The Physicians and Staff,  [facility] Introducing [facility] SERVICES! [employer] introduces Kiko patient portal. Now you can access parts of your medical record, email your doctor's office, and request medication refills online. 1. In your internet browser, go to [email or web address] 2. Click on the First Time User? Click Here link in the Sign In box. You will see the New Member Sign Up page. 3. Enter your Kiko Access Code exactly as it appears below. You will not need to use this code after youve completed the sign-up process. If you do not sign up before the expiration date, you must request a new code. · Kiko Access Code: [email or web address] Expires: 6/18/2017 12:16 PM 
 
4. Enter the last four digits of your Social Security Number (xxxx) and Date of Birth (mm/dd/yyyy) as indicated and click Submit. You will be taken to the next sign-up page. 5. Create a Kiko ID. This will be your Kiko login ID and cannot be changed, so think of one that is secure and easy to remember. 6. Create a Treasure Data password. You can change your password at any time. 7. Enter your Password Reset Question and Answer. This can be used at a later time if you forget your password. 8. Enter your e-mail address. You will receive e-mail notification when new information is available in [address] 9. Click Sign Up. You can now view and download portions of your medical record. 10. Click the Download Summary menu link to download a portable copy of your medical information. If you have questions, please visit the Frequently Asked Questions section of the [employer] website. Remember, [employer] is NOT to be used for urgent needs. For medical emergencies, dial 911. Now available from your iPhone and Android! Please provide this summary of care documentation to your next provider. Your primary care clinician is listed as Grayson Dai. If you have any questions after today's visit, please call [phone].

## 2017-04-04 NOTE — PROGRESS NOTES
Pt here for routine fasting lab work. C/o intermittent tightness in chest x 1 week. Denies having any SOB or tingling/numbeness. Reports that she is trying to quit smoking.

## 2017-04-04 NOTE — PROGRESS NOTES
Pt here for routine fasting lab work. C/o intermittent tightness in chest x 1 week. Denies having any SOB or tingling/numbness. Reports that she is trying to quit smoking. Pt reports that she is having chest tightness with activity only, resolves with rest. Pt attributed symptoms to smoking, stopped smoking for a few days and symptoms have not changed. No chest pain during visit. Pt is currently being evaluated for lupus by neurology, concerned about new diagnosis. Subjective: (As above and below)     Chief Complaint   Patient presents with   [facility]     she is a 78y.o. year old female who presents for evaluation. Reviewed PmHx, RxHx, FmHx, SocHx, AllgHx and updated in chart. Review of Systems - negative except as listed above    Objective:     Vitals:    04/04/17 0724   BP: 136/78   Pulse: 77   Resp: 18   Temp: 98.1 °F (36.7 °C)   TempSrc: Oral   SpO2: 98%   Weight: 168 lb (76.2 kg)   Height: 5' 5\" (1.651 m)     Physical Examination: General appearance - alert, well appearing, and in no distress  Mental status - normal mood, behavior, speech, dress, motor activity, and thought processes  Mouth - mucous membranes moist, pharynx normal without lesions  Chest - clear to auscultation, no wheezes, rales or rhonchi, symmetric air entry  Heart - normal rate, regular rhythm, normal S1, S2, no murmurs, rubs, clicks or gallops  Musculoskeletal - no joint tenderness, deformity or swelling  Extremities - peripheral pulses normal, no pedal edema, no clubbing or cyanosis    Assessment/ Plan:   1.  Chest pain, unspecified type  -No acute changes on EKG, LVH noted only  -refer to cardiology in light of multiple risk factors (smoking, HTN, possible lupus) and new onset exertional chest pain  - AMB POC EKG ROUTINE W/ 12 LEADS, INTER & REP  - REFERRAL TO CARDIOLOGY    2. Elevated cholesterol  -check fasting labs  - CBC WITH AUTOMATED DIFF  - LIPID PANEL  - METABOLIC PANEL, COMPREHENSIVE  - VITAMIN D, 25 HYDROXY  - TSH 3RD GENERATION  - HEMOGLOBIN A1C WITH EAG    3. Abnormal finding of blood chemistry   - HEMOGLOBIN A1C WITH EAG     Follow-up Disposition: As needed  I have discussed the diagnosis with the patient and the intended plan as seen in the above orders. The patient has received an after-visit summary and questions were answered concerning future plans.      Medication Side Effects and Warnings were discussed with patient: yes  Patient Labs were reviewed: yes  Patient Past Records were reviewed:  yes    Bigg Quinones M.D.

## 2017-04-04 NOTE — PATIENT INSTRUCTIONS
[facility]   Statement to Patients  April 1, 2014      In an effort to ensure the large volume of patient prescription refills is processed in the most efficient and expeditious manner, we are asking our patients to assist us by calling your Pharmacy for all prescription refills, this will include also your  Mail Order Pharmacy. The pharmacy will contact our office electronically to continue the refill process. Please do not wait until the last minute to call your pharmacy. We need at least 48 hours (2days) to fill prescriptions. We also encourage you to call your pharmacy before going to  your prescription to make sure it is ready. With regard to controlled substance prescription refill requests (narcotic refills) that need to be picked up at our office, we ask your cooperation by providing us with at least 72 hours (3days) notice that you will need a refill. We will not refill narcotic prescription refill requests after 4:00pm on any weekday, Monday through Thursday, or after 2:00pm on Fridays, or on the weekends. We encourage everyone to explore another way of getting your prescription refill request processed using Omn[employer], our patient web portal through our electronic medical record system. O[employer] is an efficient and effective way to communicate your medication request directly to the office and  downloadable as an sai on your smart phone . [employer] also features a review functionality that allows you to view your medication list as well as leave messages for your physician. Are you ready to get connected? If so please review the attatched instructions or speak to any of our staff to get you set up right away! Thank you so much for your cooperation. Should you have any questions please contact our Practice Administrator.     The Physicians and Staff,  [facility]

## 2017-04-05 LAB
25(OH)D3+25(OH)D2 SERPL-MCNC: 22.5 NG/ML (ref 30–100)
ALBUMIN SERPL-MCNC: 4.4 G/DL (ref 3.6–4.8)
ALBUMIN/GLOB SERPL: 1.5 {RATIO} (ref 1.2–2.2)
ALP SERPL-CCNC: 49 IU/L (ref 39–117)
ALT SERPL-CCNC: 13 IU/L (ref 0–32)
AST SERPL-CCNC: 19 IU/L (ref 0–40)
BASOPHILS # BLD AUTO: 0 X10E3/UL (ref 0–0.2)
BASOPHILS NFR BLD AUTO: 0 %
BILIRUB SERPL-MCNC: 0.5 MG/DL (ref 0–1.2)
BUN SERPL-MCNC: 16 MG/DL (ref 8–27)
BUN/CREAT SERPL: 21 (ref 12–28)
CALCIUM SERPL-MCNC: 9.3 MG/DL (ref 8.7–10.3)
CHLORIDE SERPL-SCNC: 101 MMOL/L (ref 96–106)
CHOLEST SERPL-MCNC: 147 MG/DL (ref 100–199)
CO2 SERPL-SCNC: 24 MMOL/L (ref 18–29)
CREAT SERPL-MCNC: 0.78 MG/DL (ref 0.57–1)
EOSINOPHIL # BLD AUTO: 0.5 X10E3/UL (ref 0–0.4)
EOSINOPHIL NFR BLD AUTO: 7 %
ERYTHROCYTE [DISTWIDTH] IN BLOOD BY AUTOMATED COUNT: 14.3 % (ref 12.3–15.4)
EST. AVERAGE GLUCOSE BLD GHB EST-MCNC: 114 MG/DL
GLOBULIN SER CALC-MCNC: 2.9 G/DL (ref 1.5–4.5)
GLUCOSE SERPL-MCNC: 100 MG/DL (ref 65–99)
HBA1C MFR BLD: 5.6 % (ref 4.8–5.6)
HCT VFR BLD AUTO: 41.8 % (ref 34–46.6)
HDLC SERPL-MCNC: 48 MG/DL
HGB BLD-MCNC: 13.7 G/DL (ref 11.1–15.9)
IMM GRANULOCYTES # BLD: 0 X10E3/UL (ref 0–0.1)
IMM GRANULOCYTES NFR BLD: 0 %
INTERPRETATION, 910389: NORMAL
LDLC SERPL CALC-MCNC: 80 MG/DL (ref 0–99)
LYMPHOCYTES # BLD AUTO: 2 X10E3/UL (ref 0.7–3.1)
LYMPHOCYTES NFR BLD AUTO: 31 %
MCH RBC QN AUTO: 31.2 PG (ref 26.6–33)
MCHC RBC AUTO-ENTMCNC: 32.8 G/DL (ref 31.5–35.7)
MCV RBC AUTO: 95 FL (ref 79–97)
MONOCYTES # BLD AUTO: 0.5 X10E3/UL (ref 0.1–0.9)
MONOCYTES NFR BLD AUTO: 8 %
NEUTROPHILS # BLD AUTO: 3.5 X10E3/UL (ref 1.4–7)
NEUTROPHILS NFR BLD AUTO: 54 %
PLATELET # BLD AUTO: 157 X10E3/UL (ref 150–379)
POTASSIUM SERPL-SCNC: 4.5 MMOL/L (ref 3.5–5.2)
PROT SERPL-MCNC: 7.3 G/DL (ref 6–8.5)
RBC # BLD AUTO: 4.39 X10E6/UL (ref 3.77–5.28)
SODIUM SERPL-SCNC: 141 MMOL/L (ref 134–144)
TRIGL SERPL-MCNC: 94 MG/DL (ref 0–149)
TSH SERPL DL<=0.005 MIU/L-ACNC: 0.81 UIU/ML (ref 0.45–4.5)
VLDLC SERPL CALC-MCNC: 19 MG/DL (ref 5–40)
WBC # BLD AUTO: 6.5 X10E3/UL (ref 3.4–10.8)

## 2017-04-05 NOTE — PROGRESS NOTES
Vitamin D is slightly low, please take a daily supplement of at least 2000 IU (international units) daily. All other labs are within normal limits.    Please inform

## 2017-05-01 ENCOUNTER — OFFICE VISIT (OUTPATIENT)
Dept: CARDIOLOGY CLINIC | Age: 67
End: 2017-05-01

## 2017-05-01 VITALS
WEIGHT: 170 LBS | HEIGHT: 65 IN | SYSTOLIC BLOOD PRESSURE: 158 MMHG | BODY MASS INDEX: 28.32 KG/M2 | DIASTOLIC BLOOD PRESSURE: 86 MMHG | HEART RATE: 70 BPM

## 2017-05-01 DIAGNOSIS — I10 ESSENTIAL HYPERTENSION: ICD-10-CM

## 2017-05-01 DIAGNOSIS — R07.9 CHEST PAIN, UNSPECIFIED TYPE: Primary | ICD-10-CM

## 2017-05-01 DIAGNOSIS — R94.31 ABNORMAL EKG: ICD-10-CM

## 2017-05-01 RX ORDER — CHOLECALCIFEROL (VITAMIN D3) 125 MCG
CAPSULE ORAL
COMMUNITY
End: 2017-07-17

## 2017-05-01 RX ORDER — DILTIAZEM HYDROCHLORIDE 120 MG/1
120 CAPSULE, COATED, EXTENDED RELEASE ORAL DAILY
Qty: 30 CAP | Refills: 12 | Status: SHIPPED | OUTPATIENT
Start: 2017-05-01 | End: 2017-06-22 | Stop reason: SDUPTHER

## 2017-05-01 RX ORDER — LANOLIN ALCOHOL/MO/W.PET/CERES
1000 CREAM (GRAM) TOPICAL DAILY
COMMUNITY
End: 2017-08-08 | Stop reason: ALTCHOICE

## 2017-05-01 NOTE — PROGRESS NOTES
Jacklyn Holm MD. [facility]              Patient: Diana Kelly  : 1950      Today's Date: 2017          HISTORY OF PRESENT ILLNESS:     History of Present Illness:  Ms. Vicky Hagen is referred for chest pain. Dr. Ree Israel 17 note stated \"C/o intermittent tightness in chest x 1 week. Denies having any SOB or tingling/numbness. Reports that she is trying to quit smoking. Pt reports that she is having chest tightness with activity only, resolves with rest.\"  Ms. Vicky Hagen says she gets a burning in her chest so she walks less now maybe with some associated SOB. Problems started past month. Denies CP at rest.  Denies history of heart problems. She is being worked up for Lupus. PAST MEDICAL HISTORY:     Past Medical History:   Diagnosis Date    Arthritis     Asthma     in 25s - no longer a problem    Depression     Dyslipidemia     Hypertension     Ill-defined condition     Hepatitis C    Knee injury     Liver disease     hep c--had treatment, states no longer present    Nausea & vomiting     Seizures (HCC)     Smoker          Past Surgical History:   Procedure Laterality Date    HX ORTHOPAEDIC      foot (right)    VASCULAR SURGERY PROCEDURE UNLIST      left leg varicose vein stripping           MEDICATIONS:     Current Outpatient Prescriptions   Medication Sig Dispense Refill    cyanocobalamin 1,000 mcg tablet Take 1,000 mcg by mouth daily.  cholecalciferol, vitamin D3, (VITAMIN D3) 2,000 unit tab Take  by mouth.  atorvastatin (LIPITOR) 20 mg tablet Take 1 Tab by mouth daily. 30 Tab 5    escitalopram oxalate (LEXAPRO) 10 mg tablet Take 1 Tab by mouth daily. 30 Tab 5    aspirin (ASPIRIN) 325 mg tablet Take 1 Tab by mouth daily. 21 Tab 0    oxyCODONE-acetaminophen (PERCOCET) 5-325 mg per tablet Take 1 Tab by mouth every four (4) hours as needed for Pain. Max Daily Amount: 6 Tabs.  20 Tab 0       Allergies   Allergen Reactions    Pcn [Penicillins] Unknown (comments)             SOCIAL HISTORY:     Social History   Substance Use Topics    Smoking status: Current Every Day Smoker     Packs/day: 0.10    Smokeless tobacco: Never Used      Comment: smokes 3-4 cigarettes per day x 25 years    Alcohol use Yes      Comment: about 5 drinks per week           FAMILY HISTORY:     Family History   Problem Relation Age of Onset    Elevated Lipids Mother     Cancer Father      LUNG AND THROAT    Parkinson's Disease Father     Breast Cancer Paternal Aunt                REVIEW OF SYMPTOMS:     Review of Symptoms:  Constitutional: Negative for fever, chills  HEENT: Negative for nosebleeds, tinnitus, and vision changes. Respiratory: Negative for cough, wheezing  Cardiovascular: Negative for syncope, and PND. Gastrointestinal: Negative for abdominal pain, diarrhea, melena. Genitourinary: Negative for dysuria  Musculoskeletal: Negative for myalgias. + right knee injury 2016  Skin: Negative for rash  Heme: No problems bleeding. Neurological: Negative for speech change and focal weakness. + tired           PHYSICAL EXAM:     Physical Exam:  Visit Vitals    /86    Pulse 70    Ht 5' 5\" (1.651 m)    Wt 170 lb (77.1 kg)    BMI 28.29 kg/m2     Patient appears generally well, mood and affect are appropriate and pleasant. HEENT:  Hearing intact, non-icteric, normocephalic, atraumatic. Neck Exam: Supple, No JVD.  + left neck carotid bruits. Lung Exam: Clear to auscultation, even breath sounds. Cardiac Exam: Regular rate and rhythm with no murmur  Abdomen: Soft, non-tender, normal bowel sounds. No bruits or masses. Extremities: Moves all ext well. No lower extremity edema. Vascular: 2+ dorsalis pedis pulses bilaterally.   Psych: Appropriate affect  Neuro - Grossly intact          LABS / OTHER STUDIES:       Lab Results   Component Value Date/Time    Sodium 141 04/04/2017 07:47 AM    Potassium 4.5 04/04/2017 07:47 AM    Chloride 101 04/04/2017 07:47 AM    CO2 24 04/04/2017 07:47 AM    Anion gap 9 01/18/2010 11:44 AM    Glucose 100 04/04/2017 07:47 AM    BUN 16 04/04/2017 07:47 AM    Creatinine 0.78 04/04/2017 07:47 AM    BUN/Creatinine ratio 21 04/04/2017 07:47 AM    GFR est AA 91 04/04/2017 07:47 AM    GFR est non-AA 79 04/04/2017 07:47 AM    Calcium 9.3 04/04/2017 07:47 AM    Bilirubin, total 0.5 04/04/2017 07:47 AM    AST (SGOT) 19 04/04/2017 07:47 AM    Alk. phosphatase 49 04/04/2017 07:47 AM    Protein, total 7.3 04/04/2017 07:47 AM    Albumin 4.4 04/04/2017 07:47 AM    Globulin 4.0 01/18/2010 11:44 AM    A-G Ratio 1.5 04/04/2017 07:47 AM    ALT (SGPT) 13 04/04/2017 07:47 AM     Lab Results   Component Value Date/Time    WBC 6.5 04/04/2017 07:47 AM    HGB 13.7 04/04/2017 07:47 AM    HCT 41.8 04/04/2017 07:47 AM    PLATELET 831 91/24/6943 07:47 AM    MCV 95 04/04/2017 07:47 AM       Lab Results   Component Value Date/Time    Cholesterol, total 147 04/04/2017 07:47 AM    HDL Cholesterol 48 04/04/2017 07:47 AM    LDL, calculated 80 04/04/2017 07:47 AM    VLDL, calculated 19 04/04/2017 07:47 AM    Triglyceride 94 04/04/2017 07:47 AM    CHOL/HDL Ratio 3.3 01/18/2010 11:44 AM       Lab Results   Component Value Date/Time    TSH 0.809 04/04/2017 07:47 AM             CARDIAC DIAGNOSTICS:     Cardiac Evaluation Includes:    EKG 4/4/17 - NSR, LVH    I viewed EKG myself.          ASSESSMENT AND PLAN:     Assessment and Plan:  1) Typical Chest pain   - check an exercise cardiolite and echo  - cont ASA and statin   - Adding cardizem (for HTN and angina) - due to baseline depression and fatigue, she prefers to avoid a beta-blocker)  - smoking cessation advised     2) LVH on EKG  - check an echo    3) Carotid Bruit   - check carotid dopplers     4) HTN  - BP has been high on multiple clinic visits   - will try cardizem for HTN (and angina) (due to depression and fatigue complaints already, she prefers to avoid a BB)   - info given diet    5) Dyslipidemia  - cont statin - recent lipids OK     6) Phone FU after testing. See me in one month. Patient expressed understanding of the plan - questions were answered. 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 Linda Vital MD, [address], [facility] [address]. [address], [address]  [place], [facility] [facility]  Ph: [phone]   [facility] [phone]          ADDENDUM   5/9/2017  Echo 5/8/17 - LVEF 55-60%, grade 1 diastology  Exercise Cardiolite 5/8/17 - walked 7:31 (10.1 METS), ischemic EKG changes (2 mm inferior ST depression). + VALENTE but no CP. Small, mild, partially reversible defects in anteroseptal and inferior walls. SSS = 3, SRS = 1, SDS = 2. Dilated LV with LVEF 37%  Carotid Dopplers 5/8/17 - mild 10-49% stenosis bilat     Symptoms and abnormal stress test suggestive of obstructive CAD. Will proceed with a cath. She notes some bruising on legs. CBC OK. Check an INR when she comes in for a cath. I called patient and reviewed results. Reviewed risks of cath (bleeding, MI, CVA, ARF, and death). Will proceed with cath next Monday AM since she is off then.

## 2017-05-01 NOTE — MR AVS SNAPSHOT
Visit Information Date & Time Provider Department Dept. Phone Encounter #  
 5/1/2017  1:40 PM Ling Murray MD [facility] Keri Maldonado [phone] [id number] Upcoming Health Maintenance Date Due COLONOSCOPY 9/25/1967 Pneumococcal 65+ Low/Medium Risk (2 of 2 - PCV13) 11/10/2015 GLAUCOMA SCREENING Q2Y 5/14/2016 INFLUENZA AGE 9 TO ADULT 8/1/2017 MEDICARE YEARLY EXAM 1/11/2018 BREAST CANCER SCRN MAMMOGRAM 3/20/2019 DTaP/Tdap/Td series (2 - Td) 11/10/2024 Allergies as of 5/1/2017  Review Complete On: 5/1/2017 By: Ling Murray MD  
  
 Severity Noted Reaction Type Reactions Pcn [Penicillins]  07/02/2010   Side Effect Unknown (comments) Current Immunizations  Reviewed on 12/1/2015 Name Date Influenza High Dose Vaccine PF 1/10/2017 Influenza Vaccine 11/17/2014 Influenza Vaccine (Quad) PF 12/1/2015 Pneumococcal Polysaccharide (PPSV-23) 11/10/2014 Tdap 11/10/2014 Not reviewed this visit Vitals BP Pulse Height(growth percentile) Weight(growth percentile) BMI OB Status 158/86 70 5' 5\" (1.651 m) 170 lb (77.1 kg) 28.29 kg/m2 Postmenopausal  
 Smoking Status Current Every Day Smoker Vitals History BMI and BSA Data Body Mass Index Body Surface Area  
 28.29 kg/m 2 1.88 m 2 Preferred Pharmacy Pharmacy Name Phone [id number] [address] [phone] Your Updated Medication List  
  
   
This list is accurate as of: 5/1/17  2:13 PM.  Always use your most recent med list.  
  
  
  
  
 aspirin 325 mg tablet Commonly known as:  ASPIRIN Take 1 Tab by mouth daily. atorvastatin 20 mg tablet Commonly known as:  LIPITOR Take 1 Tab by mouth daily. cyanocobalamin 1,000 mcg tablet Take 1,000 mcg by mouth daily. dilTIAZem  mg ER capsule Commonly known as:  CARDIZEM CD Take 1 Cap by mouth daily. escitalopram oxalate 10 mg tablet Commonly known as:  Shan Imani Take 1 Tab by mouth daily. oxyCODONE-acetaminophen 5-325 mg per tablet Commonly known as:  PERCOCET Take 1 Tab by mouth every four (4) hours as needed for Pain. Max Daily Amount: 6 Tabs. VITAMIN D3 2,000 unit Tab Generic drug:  cholecalciferol (vitamin D3) Take  by mouth. Prescriptions Sent to Pharmacy Refills  
 dilTIAZem CD (CARDIZEM CD) 120 mg ER capsule 12 Sig: Take 1 Cap by mouth daily. Class: Normal  
 Pharmacy: [facility]/pharmacy [facility] [place] [address], [address] [place] [address] Ph #: [phone] Route: Oral  
  
Introducing [facility]! [employer] introduces [employer] patient portal. Now you can access parts of your medical record, email your doctor's office, and request medication refills online. 1. In your internet browser, go to [email or web address] 2. Click on the First Time User? Click Here link in the Sign In box. You will see the New Member Sign Up page. 3. Enter your [employer] Access Code exactly as it appears below. You will not need to use this code after youve completed the sign-up process. If you do not sign up before the expiration date, you must request a new code. · [employer] Access Code: [email or web address] Expires: 6/18/2017 12:16 PM 
 
4. Enter the last four digits of your Social Security Number (xxxx) and Date of Birth (mm/dd/yyyy) as indicated and click Submit. You will be taken to the next sign-up page. 5. Create a galaxyadvisorst ID. This will be your [employer] login ID and cannot be changed, so think of one that is secure and easy to remember. 6. Create a [employer] password. You can change your password at any time. 7. Enter your Password Reset Question and Answer. This can be used at a later time if you forget your password. 8. Enter your e-mail address. You will receive e-mail notification when new information is available in [address] 9. Click Sign Up. You can now view and download portions of your medical record. 10. Click the Download Summary menu link to download a portable copy of your medical information. If you have questions, please visit the Frequently Asked Questions section of the [employer] website. Remember, [employer] is NOT to be used for urgent needs. For medical emergencies, dial 911. Now available from your iPhone and Android! Please provide this summary of care documentation to your next provider. Your primary care clinician is listed as Maryellen Reyes. If you have any questions after today's visit, please call [phone].

## 2017-05-08 ENCOUNTER — CLINICAL SUPPORT (OUTPATIENT)
Dept: CARDIOLOGY CLINIC | Age: 67
End: 2017-05-08

## 2017-05-08 DIAGNOSIS — R07.9 CHEST PAIN, UNSPECIFIED TYPE: ICD-10-CM

## 2017-05-08 DIAGNOSIS — R09.89 CAROTID BRUIT, UNSPECIFIED LATERALITY: Primary | ICD-10-CM

## 2017-05-08 DIAGNOSIS — I65.23 CAROTID STENOSIS, BILATERAL: ICD-10-CM

## 2017-05-08 DIAGNOSIS — R94.31 ABNORMAL EKG: Primary | ICD-10-CM

## 2017-05-08 DIAGNOSIS — E78.5 DYSLIPIDEMIA: ICD-10-CM

## 2017-05-08 DIAGNOSIS — R07.9 CHEST PAIN, UNSPECIFIED TYPE: Primary | ICD-10-CM

## 2017-05-08 DIAGNOSIS — R94.31 ABNORMAL EKG: ICD-10-CM

## 2017-05-08 DIAGNOSIS — I10 ESSENTIAL HYPERTENSION: ICD-10-CM

## 2017-05-08 LAB — EF %, EXTERNAL: NORMAL

## 2017-05-08 NOTE — PROGRESS NOTES
See scanned report. Dr. Claudio Zapata ordered study and Dr. Claudio Zapata read study. ID verified per protocol.

## 2017-05-08 NOTE — PROCEDURES
[facility]  *** FINAL REPORT ***    Name: Marian Peterson  MRN: [id number]       Outpatient  : 25 Sep 1950  HIS Order #: [id number]  [address] [facility] Visit #: [id number]  Date: 08 May 2017    TYPE OF TEST: Cerebrovascular Duplex    REASON FOR TEST  Carotid bruit    Right Carotid:-             Proximal               Mid                 Distal  cm/s  Systolic  Diastolic  Systolic  Diastolic  Systolic  Diastolic  CCA:     73.8      14.0                            73.0      15.0  Bulb:   111.0      33.0  ECA:     90.0       7.0  ICA:    102.0      33.0      119.0      38.0      103.0      35.0  ICA/CCA:  1.4       2.2    ICA Stenosis: <50%    Right Vertebral:-  Finding: Not visualized  Sys:  Etta:    Right Subclavian:    Left Carotid:-            Proximal                Mid                 Distal  cm/s  Systolic  Diastolic  Systolic  Diastolic  Systolic  Diastolic  CCA:     73.1      16.0                            93.0      19.0  Bulb:    96.0      31.0  ECA:     94.0       6.0  ICA:    117.0      35.0      124.0      41.0       96.0      31.0  ICA/CCA:  1.3       1.8    ICA Stenosis: Unknown    Left Vertebral:-  Finding: Antegrade  Sys:       49.0  Etta:       14.0    Left Subclavian:    INTERPRETATION/FINDINGS  PROCEDURE:  Evaluation of the extracranial cerebrovascular arteries  with ultrasound (B-mode imaging, pulsed Doppler, color Doppler). Includes the common carotid, internal carotid, external carotid, and  vertebral arteries. FINDINGS:    Right:  Mild heterogeneous plaque is exhibited within the proximal  segment of the internal carotid artery. Mild filling defects are seen   on color flow imaging and peak systolic velocities are recorded at  119 cm/s. Left:  Mild mixed plaquing is visualized the level of the  bifurcation extending into the proximal internal carotid artery.   Color flow imaging reveals a filling defect in this region and peak  systolic velocities are recorded at 124 cm/s.    IMPRESSION: Findings are consistent with 10-49% stenosis of the right  internal carotid and 10-49% stenosis of the left internal carotid. Vertebral arteries patent with antegrade flow. ADDITIONAL COMMENTS    I have personally reviewed the data relevant to the interpretation of  this  study.     TECHNOLOGIST: RANDAL Noe  Signed: 05/08/2017 04:14 PM    PHYSICIAN: Mauro Davila MD, [facility]  Signed: 05/09/2017 05:29 PM

## 2017-05-10 ENCOUNTER — DOCUMENTATION ONLY (OUTPATIENT)
Dept: CARDIOLOGY CLINIC | Age: 67
End: 2017-05-10

## 2017-05-10 DIAGNOSIS — R07.9 CHEST PAIN, UNSPECIFIED TYPE: ICD-10-CM

## 2017-05-10 DIAGNOSIS — R94.39 ABNORMAL NUCLEAR STRESS TEST: Primary | ICD-10-CM

## 2017-05-10 RX ORDER — SODIUM CHLORIDE 0.9 % (FLUSH) 0.9 %
5-10 SYRINGE (ML) INJECTION AS NEEDED
Status: CANCELLED | OUTPATIENT
Start: 2017-05-15

## 2017-05-10 RX ORDER — SODIUM CHLORIDE 0.9 % (FLUSH) 0.9 %
5-10 SYRINGE (ML) INJECTION EVERY 8 HOURS
Status: CANCELLED | OUTPATIENT
Start: 2017-05-15

## 2017-05-10 NOTE — PROGRESS NOTES
Card cath set up through [facility] in cath lab for Monday 5/15 at [facility], req sent to [address] [place] [address], scheduled in marisol, lien in [facility] and instructions called to Ms Bertrand Lasso

## 2017-05-16 ENCOUNTER — TELEPHONE (OUTPATIENT)
Dept: FAMILY MEDICINE CLINIC | Age: 67
End: 2017-05-16

## 2017-06-22 RX ORDER — DILTIAZEM HYDROCHLORIDE 120 MG/1
120 CAPSULE, COATED, EXTENDED RELEASE ORAL DAILY
Qty: 90 CAP | Refills: 3 | Status: SHIPPED | OUTPATIENT
Start: 2017-06-22 | End: 2017-10-19 | Stop reason: SDUPTHER

## 2017-06-30 ENCOUNTER — DOCUMENTATION ONLY (OUTPATIENT)
Dept: FAMILY MEDICINE CLINIC | Age: 67
End: 2017-06-30

## 2017-06-30 NOTE — PROGRESS NOTES
Faxed office notes to [facility] at [phone] per request and pcp referral, confirmation rec'd and scanned into media.

## 2017-07-17 ENCOUNTER — OFFICE VISIT (OUTPATIENT)
Dept: CARDIOLOGY CLINIC | Age: 67
End: 2017-07-17

## 2017-07-17 VITALS
OXYGEN SATURATION: 98 % | BODY MASS INDEX: 27.99 KG/M2 | WEIGHT: 168 LBS | HEART RATE: 56 BPM | SYSTOLIC BLOOD PRESSURE: 130 MMHG | DIASTOLIC BLOOD PRESSURE: 60 MMHG | HEIGHT: 65 IN | RESPIRATION RATE: 16 BRPM

## 2017-07-17 DIAGNOSIS — I25.10 CORONARY ARTERY DISEASE INVOLVING NATIVE HEART WITHOUT ANGINA PECTORIS, UNSPECIFIED VESSEL OR LESION TYPE: Primary | ICD-10-CM

## 2017-07-17 DIAGNOSIS — I10 ESSENTIAL HYPERTENSION: ICD-10-CM

## 2017-07-17 DIAGNOSIS — E78.5 DYSLIPIDEMIA: ICD-10-CM

## 2017-07-17 RX ORDER — GUAIFENESIN 100 MG/5ML
81 LIQUID (ML) ORAL DAILY
Qty: 30 TAB | Refills: 0
Start: 2017-07-17

## 2017-07-17 RX ORDER — METOPROLOL TARTRATE 50 MG/1
TABLET ORAL
COMMUNITY
Start: 2017-07-16 | End: 2017-10-19 | Stop reason: SDUPTHER

## 2017-07-17 RX ORDER — CHROMIUM PICOLINATE 200 MCG
TABLET ORAL
COMMUNITY
End: 2020-01-20

## 2017-07-17 RX ORDER — PRASUGREL HYDROCHLORIDE 10 MG/1
TABLET, COATED ORAL
Refills: 2 | COMMUNITY
Start: 2017-06-22 | End: 2017-08-28 | Stop reason: SDUPTHER

## 2017-07-17 RX ORDER — FISH OIL/DHA/EPA 1200-144MG
CAPSULE ORAL DAILY
COMMUNITY
End: 2017-12-04

## 2017-07-17 RX ORDER — LOSARTAN POTASSIUM 50 MG/1
TABLET ORAL
Refills: 3 | COMMUNITY
Start: 2017-06-20 | End: 2017-08-28 | Stop reason: SDUPTHER

## 2017-07-17 NOTE — PROGRESS NOTES
Jose Champagne MD. [facility] [place]              Patient: Jb Gleason  : 1950      Today's Date: 2017            HISTORY OF PRESENT ILLNESS:     History of Present Illness:  Ms. Riki Kuhn is here for follow-up. She had a stent at [address] back in May. She had a \"blood clot\" and saw Dr. Dawit Casanova for that. She was seeing Dr. Dorann Boas and is now transferring care back to us. No more chest pain but is not walking. She feels well, but she has some pain in legs. She is seeing Rheumatology tomorrow. PAST MEDICAL HISTORY:     Past Medical History:   Diagnosis Date    Arthritis     Asthma     in  - no longer a problem    Depression     Dyslipidemia     Hypertension     Ill-defined condition     Hepatitis C    Knee injury     Liver disease     hep c--had treatment, states no longer present    Nausea & vomiting     Seizures (HCC)     Smoker        Past Surgical History:   Procedure Laterality Date    HX ORTHOPAEDIC      foot (right)    VASCULAR SURGERY PROCEDURE UNLIST      left leg varicose vein stripping           MEDICATIONS:     Current Outpatient Prescriptions   Medication Sig Dispense Refill    losartan (COZAAR) 50 mg tablet TAKE 2 TABLETS BY MOUTH EVERY DAY  3    metoprolol tartrate (LOPRESSOR) 50 mg tablet       EFFIENT 10 mg tablet TAKE 1 TABLET BY MOUTH EVERY DAY  2    Calcium-Cholecalciferol, D3, (CALCIUM 600 WITH VITAMIN D3) 600 mg(1,500mg) -400 unit cap Take  by mouth.  fish oil-dha-epa 1,200-144-216 mg cap Take  by mouth daily.  dilTIAZem CD (CARDIZEM CD) 120 mg ER capsule Take 1 Cap by mouth daily. 90 Cap 3    fluconazole (DIFLUCAN) 150 mg tablet Take 1 Tab by mouth daily. Repeat in 3 days if needed. 2 Tab 0    cyanocobalamin 1,000 mcg tablet Take 1,000 mcg by mouth daily.  atorvastatin (LIPITOR) 20 mg tablet Take 1 Tab by mouth daily. 30 Tab 5    escitalopram oxalate (LEXAPRO) 10 mg tablet Take 1 Tab by mouth daily.  30 Tab 5    oxyCODONE-acetaminophen (PERCOCET) 5-325 mg per tablet Take 1 Tab by mouth every four (4) hours as needed for Pain. Max Daily Amount: 6 Tabs. 20 Tab 0       Allergies   Allergen Reactions    Pcn [Penicillins] Unknown (comments)             SOCIAL HISTORY:     Social History   Substance Use Topics    Smoking status: Current Every Day Smoker     Packs/day: 0.10    Smokeless tobacco: Never Used      Comment: smokes 3-4 cigarettes per day x 25 years    Alcohol use Yes      Comment: about 5 drinks per week           FAMILY HISTORY:     Family History   Problem Relation Age of Onset    Elevated Lipids Mother     Cancer Father      LUNG AND THROAT    Parkinson's Disease Father     Breast Cancer Paternal Aunt           REVIEW OF SYMPTOMS:      Review of Symptoms:  Constitutional: Negative for fever, chills  HEENT: Negative for nosebleeds, tinnitus, and vision changes. Respiratory: Negative for cough, wheezing  Cardiovascular: Negative for syncope, and PND. Gastrointestinal: Negative for abdominal pain, diarrhea, melena. Genitourinary: Negative for dysuria  Musculoskeletal: Negative for myalgias. + right knee injury 2016  Skin: Negative for rash  Heme: No problems bleeding. Neurological: Negative for speech change and focal weakness. + tired, constipation               PHYSICAL EXAM:      Physical Exam:  Visit Vitals    /60 (BP 1 Location: Left arm, BP Patient Position: Sitting)    Pulse (!) 56    Resp 16    Ht 5' 5\" (1.651 m)    Wt 168 lb (76.2 kg)    SpO2 98%    BMI 27.96 kg/m2       Patient appears generally well, mood and affect are appropriate and pleasant. HEENT:  Hearing intact, non-icteric, normocephalic, atraumatic. Neck Exam: Supple, No JVD.  + left neck carotid bruits. Lung Exam: Clear to auscultation, even breath sounds. Cardiac Exam: Regular rate and rhythm with no murmur  Abdomen: Soft, non-tender, normal bowel sounds. No bruits or masses. Extremities: Moves all ext well. No lower extremity edema. Vascular: 2+ dorsalis pedis pulses bilaterally. Psych: Appropriate affect  Neuro - Grossly intact  Right groin surgical scar healing well              LABS / OTHER STUDIES:      Lab Results   Component Value Date/Time    Sodium 141 04/04/2017 07:47 AM    Potassium 4.5 04/04/2017 07:47 AM    Chloride 101 04/04/2017 07:47 AM    CO2 24 04/04/2017 07:47 AM    Anion gap 9 01/18/2010 11:44 AM    Glucose 100 04/04/2017 07:47 AM    BUN 16 04/04/2017 07:47 AM    Creatinine 0.78 04/04/2017 07:47 AM    BUN/Creatinine ratio 21 04/04/2017 07:47 AM    GFR est AA 91 04/04/2017 07:47 AM    GFR est non-AA 79 04/04/2017 07:47 AM    Calcium 9.3 04/04/2017 07:47 AM    Bilirubin, total 0.5 04/04/2017 07:47 AM    AST (SGOT) 19 04/04/2017 07:47 AM    Alk. phosphatase 49 04/04/2017 07:47 AM    Protein, total 7.3 04/04/2017 07:47 AM    Albumin 4.4 04/04/2017 07:47 AM    Globulin 4.0 01/18/2010 11:44 AM    A-G Ratio 1.5 04/04/2017 07:47 AM    ALT (SGPT) 13 04/04/2017 07:47 AM       Lab Results   Component Value Date/Time    WBC 6.5 04/04/2017 07:47 AM    HGB 13.7 04/04/2017 07:47 AM    HCT 41.8 04/04/2017 07:47 AM    PLATELET 193 23/02/8031 07:47 AM    MCV 95 04/04/2017 07:47 AM     Lab Results   Component Value Date/Time    Cholesterol, total 147 04/04/2017 07:47 AM    HDL Cholesterol 48 04/04/2017 07:47 AM    LDL, calculated 80 04/04/2017 07:47 AM    VLDL, calculated 19 04/04/2017 07:47 AM    Triglyceride 94 04/04/2017 07:47 AM    CHOL/HDL Ratio 3.3 01/18/2010 11:44 AM       Lab Results   Component Value Date/Time    TSH 0.809 04/04/2017 07:47 AM                CARDIAC DIAGNOSTICS:      Cardiac Evaluation Includes:  Echo 5/8/17 - LVEF 55-60%, grade 1 diastology  Exercise Cardiolite 5/8/17 - walked 7:31 (10.1 METS), ischemic EKG changes (2 mm inferior ST depression). + VALENTE but no CP. Small, mild, partially reversible defects in anteroseptal and inferior walls. SSS = 3, SRS = 1, SDS = 2.    Dilated LV with LVEF 37%  Carotid Dopplers 5/8/17 - mild 10-49% stenosis bilat       EKG 4/4/17 - NSR, LVH             ASSESSMENT AND PLAN:      Assessment and Plan:  1) CAD  - Recent PCI at [facility]. Will get records to review and update chart accordingly   - cont Effient, BB, CCB and statin. Add ASA 81 mg daily   - smoking cessation advised      2) Carotid Bruit   - mild carotid disease 5/17     3) PAD ? -will get records from [place] [id number]      4) HTN  - continue multiple meds      5) Dyslipidemia  - cont statin - recheck lipids      6) Chronic Leg pain   - she is seeing Rheumatology tomorrow - being worked up for Lupus she says     7) See me in 3 months. Patient expressed understanding of the plan - questions were answered. 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          Jaimie Mcgrath MD, [address]  [address] [place] [address], [address]                [address], [address] [place] [address]  Ph: [phone]                                                             Ph [phone]          ADDENDUM   7/31/2017  [facility] and 's records obtained and sent to scanning. S/p right femoral thrombectomy and bovine patch plasty of right CFA/PFA 5/17/17 (s/p cath 5/12/17)     EKG 5/29/17 - NSR, LVH  Labs 5/29/17 - Hgb 10.9, LFT's OK    NSTEMI with PCI (LETICIA)  to RCA 5/12/17     Cath 5/12/17 - LM 20-30% narrowing, LAD calcified however good lumen, LCX calcified but OK lumen, RCA with 95% ostial stenosis and 100% proximal occlusion. Had PCI of RCA with LETICIA.   LVEF 50-55% with inferobasal HK

## 2017-07-17 NOTE — MR AVS SNAPSHOT
Visit Information Date & Time Provider Department Dept. Phone Encounter #  
 7/17/2017  2:00 PM Khushbu Wallace MD [facility] Antonino Taylor [phone] [id number] Your Appointments 10/16/2017  2:20 PM  
ESTABLISHED PATIENT with Khushbu Wallace MD  
[facility] ([facility]) Appt Note: 3 mo fu appt [address] [address] [phone]  
  
   
 [address] [address] [address] Upcoming Health Maintenance Date Due COLONOSCOPY 9/25/1967 Pneumococcal 65+ Low/Medium Risk (2 of 2 - PCV13) 11/10/2015 GLAUCOMA SCREENING Q2Y 5/14/2016 INFLUENZA AGE 9 TO ADULT 8/1/2017 MEDICARE YEARLY EXAM 1/11/2018 BREAST CANCER SCRN MAMMOGRAM 3/20/2019 DTaP/Tdap/Td series (2 - Td) 11/10/2024 Allergies as of 7/17/2017  Review Complete On: 7/17/2017 By: Khushbu Wallace MD  
  
 Severity Noted Reaction Type Reactions Pcn [Penicillins]  07/02/2010   Side Effect Unknown (comments) Current Immunizations  Reviewed on 12/1/2015 Name Date Influenza High Dose Vaccine PF 1/10/2017 Influenza Vaccine 11/17/2014 Influenza Vaccine (Quad) PF 12/1/2015 Pneumococcal Polysaccharide (PPSV-23) 11/10/2014 Tdap 11/10/2014 Not reviewed this visit You Were Diagnosed With   
  
 Codes Comments Coronary artery disease involving native heart without angina pectoris, unspecified vessel or lesion type    -  Primary ICD-10-CM: I25.10 ICD-9-CM: 414.01 Dyslipidemia     ICD-10-CM: E78.5 ICD-9-CM: 272.4 Essential hypertension     ICD-10-CM: I10 
ICD-9-CM: 401.9 Vitals BP Pulse Resp Height(growth percentile) Weight(growth percentile) SpO2  
 130/60 (BP 1 Location: Left arm, BP Patient Position: Sitting) (!) 56 16 5' 5\" (1.651 m) 168 lb (76.2 kg) 98% BMI OB Status Smoking Status 27.96 kg/m2 Postmenopausal Current Every Day Smoker BMI and BSA Data Body Mass Index Body Surface Area  
 27.96 kg/m 2 1.87 m 2 Preferred Pharmacy Pharmacy Name Phone [facility]/PHARMACY #[id number]- [place], [address] [place] [address] [phone] Your Updated Medication List  
  
   
This list is accurate as of: 7/17/17  2:49 PM.  Always use your most recent med list.  
  
  
  
  
 aspirin 81 mg chewable tablet Take 1 Tab by mouth daily. atorvastatin 20 mg tablet Commonly known as:  LIPITOR Take 1 Tab by mouth daily. CALCIUM 600 WITH VITAMIN D3 600 mg(1,500mg) -400 unit Cap Generic drug:  Calcium-Cholecalciferol (D3) Take  by mouth.  
  
 cyanocobalamin 1,000 mcg tablet Take 1,000 mcg by mouth daily. dilTIAZem  mg ER capsule Commonly known as:  CARDIZEM CD Take 1 Cap by mouth daily. EFFIENT 10 mg tablet Generic drug:  prasugrel TAKE 1 TABLET BY MOUTH EVERY DAY  
  
 escitalopram oxalate 10 mg tablet Commonly known as:  Levorn Allegra Take 1 Tab by mouth daily. fish oil-dha-epa 1,200-144-216 mg Cap Take  by mouth daily. fluconazole 150 mg tablet Commonly known as:  DIFLUCAN Take 1 Tab by mouth daily. Repeat in 3 days if needed. losartan 50 mg tablet Commonly known as:  COZAAR  
TAKE 2 TABLETS BY MOUTH EVERY DAY  
  
 metoprolol tartrate 50 mg tablet Commonly known as:  LOPRESSOR  
  
 oxyCODONE-acetaminophen 5-325 mg per tablet Commonly known as:  PERCOCET Take 1 Tab by mouth every four (4) hours as needed for Pain. Max Daily Amount: 6 Tabs. We Performed the Following CBC W/O DIFF [54504 CPT(R)] CK [id number] CPT(R)] LIPID PANEL [85682 CPT(R)] METABOLIC PANEL, COMPREHENSIVE [05782 CPT(R)] Introducing [facility] & HEALTH SERVICES! [facility] introduces [employer] patient portal. Now you can access parts of your medical record, email your doctor's office, and request medication refills online. 1. In your internet browser, go to [email or web address] 2. Click on the First Time User? Click Here link in the Sign In box. You will see the New Member Sign Up page. 3. Enter your [employer] Access Code exactly as it appears below. You will not need to use this code after youve completed the sign-up process. If you do not sign up before the expiration date, you must request a new code. · [employer] Access Code: [email or web address] Expires: 10/15/2017  2:19 PM 
 
4. Enter the last four digits of your Social Security Number (xxxx) and Date of Birth (mm/dd/yyyy) as indicated and click Submit. You will be taken to the next sign-up page. 5. Create a [employer] ID. This will be your [employer] login ID and cannot be changed, so think of one that is secure and easy to remember. 6. Create a [employer] password. You can change your password at any time. 7. Enter your Password Reset Question and Answer. This can be used at a later time if you forget your password. 8. Enter your e-mail address. You will receive e-mail notification when new information is available in [address] 9. Click Sign Up. You can now view and download portions of your medical record. 10. Click the Download Summary menu link to download a portable copy of your medical information. If you have questions, please visit the Frequently Asked Questions section of the [employer] website. Remember, [employer] is NOT to be used for urgent needs. For medical emergencies, dial 911. Now available from your iPhone and Android! Please provide this summary of care documentation to your next provider. Your primary care clinician is listed as Nicky Parkinson. If you have any questions after today's visit, please call [phone].

## 2017-07-21 ENCOUNTER — TELEPHONE (OUTPATIENT)
Dept: FAMILY MEDICINE CLINIC | Age: 67
End: 2017-07-21

## 2017-07-21 DIAGNOSIS — R07.9 CHEST PAIN, UNSPECIFIED: Primary | ICD-10-CM

## 2017-07-21 DIAGNOSIS — R76.0 ELEVATED ANTIBODY LEVELS: ICD-10-CM

## 2017-07-21 NOTE — TELEPHONE ENCOUNTER
Please add referral to dr Mona Griffin  DX R07.9 AND DR Taisha Bobo ([facility])  DX R76.0

## 2017-08-08 ENCOUNTER — OFFICE VISIT (OUTPATIENT)
Dept: FAMILY MEDICINE CLINIC | Age: 67
End: 2017-08-08

## 2017-08-08 VITALS
HEIGHT: 65 IN | TEMPERATURE: 97.7 F | HEART RATE: 60 BPM | BODY MASS INDEX: 28.66 KG/M2 | SYSTOLIC BLOOD PRESSURE: 144 MMHG | DIASTOLIC BLOOD PRESSURE: 78 MMHG | OXYGEN SATURATION: 98 % | WEIGHT: 172 LBS | RESPIRATION RATE: 18 BRPM

## 2017-08-08 DIAGNOSIS — M25.561 CHRONIC PAIN OF RIGHT KNEE: ICD-10-CM

## 2017-08-08 DIAGNOSIS — K59.00 DIFFICULTY PASSING STOOL: Primary | ICD-10-CM

## 2017-08-08 DIAGNOSIS — L63.9 ALOPECIA AREATA: ICD-10-CM

## 2017-08-08 DIAGNOSIS — G89.29 CHRONIC PAIN OF RIGHT KNEE: ICD-10-CM

## 2017-08-08 DIAGNOSIS — F39 MOOD DISORDER (HCC): ICD-10-CM

## 2017-08-08 RX ORDER — BUPROPION HYDROCHLORIDE 150 MG/1
150 TABLET ORAL
Qty: 30 TAB | Refills: 5 | Status: SHIPPED | OUTPATIENT
Start: 2017-08-08 | End: 2017-10-19 | Stop reason: SDUPTHER

## 2017-08-08 NOTE — PROGRESS NOTES
Pt here c/o ongoing depression. States that she has had no improvement since taking anti depressant. Requesting an order to have a colonoscopy and a handicap parking pass for arthritis in knee. Also c/o a bald spot on head.

## 2017-08-08 NOTE — MR AVS SNAPSHOT
Visit Information Date & Time Provider Department Dept. Phone Encounter #  
 8/8/2017 11:30 AM Rocky Jimenez MD [facility] [phone] [id number] Your Appointments 10/16/2017  2:20 PM  
ESTABLISHED PATIENT with Aldo Perez MD  
[facility] ([address]) Appt Note: 3 mo fu appt [id number] [place] [address] Road [address] [phone]  
  
   
 [address] [address] [address] [address] Upcoming Health Maintenance Date Due COLONOSCOPY 9/25/1967 Pneumococcal 65+ Low/Medium Risk (2 of 2 - PCV13) 11/10/2015 GLAUCOMA SCREENING Q2Y 5/14/2016 INFLUENZA AGE 9 TO ADULT 8/1/2017 MEDICARE YEARLY EXAM 1/11/2018 BREAST CANCER SCRN MAMMOGRAM 3/20/2019 DTaP/Tdap/Td series (2 - Td) 11/10/2024 Allergies as of 8/8/2017  Review Complete On: 8/8/2017 By: Rocky Jimenez MD  
  
 Severity Noted Reaction Type Reactions Pcn [Penicillins]  07/02/2010   Side Effect Unknown (comments) Current Immunizations  Reviewed on 12/1/2015 Name Date Influenza High Dose Vaccine PF 1/10/2017 Influenza Vaccine 11/17/2014 Influenza Vaccine (Quad) PF 12/1/2015 Pneumococcal Polysaccharide (PPSV-23) 11/10/2014 Tdap 11/10/2014 Not reviewed this visit You Were Diagnosed With   
  
 Codes Comments Difficulty passing stool    -  Primary ICD-10-CM: K59.00 ICD-9-CM: 564.00 Mood disorder ([facility]     ICD-10-CM: F39 
ICD-9-CM: 296.90 Chronic pain of right knee     ICD-10-CM: M25.561, G89.29 ICD-9-CM: 719.46, 338.29 Alopecia areata     ICD-10-CM: L63.9 ICD-9-CM: 704.01 Vitals BP Pulse Temp Resp Height(growth percentile) Weight(growth percentile) 144/78 (BP 1 Location: Left arm, BP Patient Position: Sitting) 60 97.7 °F (36.5 °C) (Oral) 18 5' 5\" (1.651 m) 172 lb (78 kg) SpO2 BMI OB Status Smoking Status 98% 28.62 kg/m2 Postmenopausal Current Every Day Smoker Vitals History BMI and BSA Data Body Mass Index Body Surface Area  
 28.62 kg/m 2 1.89 m 2 Preferred Pharmacy Pharmacy Name Phone [facility]/PHARMACY #[id number]- [place], [address] [facility] [phone] Your Updated Medication List  
  
   
This list is accurate as of: 8/8/17 12:28 PM.  Always use your most recent med list.  
  
  
  
  
 aspirin 81 mg chewable tablet Take 1 Tab by mouth daily. atorvastatin 20 mg tablet Commonly known as:  LIPITOR Take 1 Tab by mouth daily. buPROPion  mg tablet Commonly known as:  Dariela Erm Take 1 Tab by mouth every morning. CALCIUM 600 WITH VITAMIN D3 600 mg(1,500mg) -400 unit Cap Generic drug:  Calcium-Cholecalciferol (D3) Take  by mouth. dilTIAZem  mg ER capsule Commonly known as:  CARDIZEM CD Take 1 Cap by mouth daily. EFFIENT 10 mg tablet Generic drug:  prasugrel TAKE 1 TABLET BY MOUTH EVERY DAY  
  
 escitalopram oxalate 10 mg tablet Commonly known as:  Nixon Carolus Take 1 Tab by mouth daily. fish oil-dha-epa 1,200-144-216 mg Cap Take  by mouth daily. losartan 50 mg tablet Commonly known as:  COZAAR  
TAKE 2 TABLETS BY MOUTH EVERY DAY  
  
 metoprolol tartrate 50 mg tablet Commonly known as:  LOPRESSOR  
  
 oxyCODONE-acetaminophen 5-325 mg per tablet Commonly known as:  PERCOCET Take 1 Tab by mouth every four (4) hours as needed for Pain. Max Daily Amount: 6 Tabs. Prescriptions Sent to Pharmacy Refills buPROPion XL (WELLBUTRIN XL) 150 mg tablet 5 Sig: Take 1 Tab by mouth every morning. Class: Normal  
 Pharmacy: [facility]/pharmacy #[id number]- [place], [address] [place] [address] #: [phone] Route: Oral  
  
We Performed the Following REFERRAL TO GASTROENTEROLOGY [WXD73 Custom] Comments:  
 Please evaluate patient for screening colonoscopy, difficulty with bowel movements. REFERRAL TO ORTHOPEDICS [JPZ425 Custom] Comments:  
 Please evaluate patient for right knee pain. Referral Information Referral ID Referred By Referred To  
  
 [id number] [facility] [address] [facility] [place] [address] Phone: [phone] Fax: [phone] Visits Status Start Date End Date 1 New Request 8/8/17 8/8/18 If your referral has a status of pending review or denied, additional information will be sent to support the outcome of this decision. Referral ID Referred By Referred To  
 [id number] [address] Phone: [phone] Fax: [phone] Visits Status Start Date End Date 1 New Request 8/8/17 8/8/18 If your referral has a status of pending review or denied, additional information will be sent to support the outcome of this decision. Patient Instructions Hair Loss From Alopecia Areata: Care Instructions Your Care Instructions Alopecia areata is a type of hair loss that affects the hair on the scalp or other areas of the body. It's a problem that can go away for some time and then come back. This condition is most common in people who are younger than 21, but it can happen to children and adults of any age. Hair loss can affect how you feel about yourself. Your hair may fall out in clumps and grow back over time. In rare cases, a person with alopecia may lose all body hair. The pattern of hair loss and growth is different for everyone. You can treat alopecia with medicine, but treatment does not always work. You may have shots of medicine in your scalp or skin, take pills, or put the medicine on your scalp or skin. Or you may decide to wait and see whether your hair grows again before trying medicine.  
Because hair loss is upsetting for most people, seek support from family and friends. Talk to a counselor or other professional if you need more help. Follow-up care is a key part of your treatment and safety. Be sure to make and go to all appointments, and call your doctor if you are having problems. It's also a good idea to know your test results and keep a list of the medicines you take. How can you care for yourself at home? · If you decide to treat your hair loss, use medicines exactly as prescribed. Call your doctor if you think you are having a problem with your medicine. · If you want to cover your scalp, you can use hats, scarves, or other head coverings. Or you may want to wear a hairpiece or a wig. · Try hair care products and styling techniques. Hair care products or perms may make hair appear thicker. You can use dyes to color the scalp. But long-term use of perms or dyes may lead to more hair loss. · Talk to your doctor if you are very upset about your hair loss. You can get counseling to help you cope with the condition. When should you call for help? Watch closely for changes in your health, and be sure to contact your doctor if: 
· You do not get better as expected. Where can you learn more? Go to http://rupert-arnold.info/. Enter T401 in the search box to learn more about \"Hair Loss From Alopecia Areata: Care Instructions. \" Current as of: October 13, 2016 Content Version: 11.3 © 2463-6938 Healthwise, Incorporated. Care instructions adapted under license by [employer] (which disclaims liability or warranty for this information). If you have questions about a medical condition or this instruction, always ask your healthcare professional. [address] any warranty or liability for your use of this information. Introducing [facility] & HEALTH SERVICES! Nehemiah Mendiola introduces Health[employer].com patient portal. Now you can access parts of your medical record, email your doctor's office, and request medication refills online. 1. In your internet browser, go to [email or web address] 2. Click on the First Time User? Click Here link in the Sign In box. You will see the New Member Sign Up page. 3. Enter your [email or web address] Access Code exactly as it appears below. You will not need to use this code after youve completed the sign-up process. If you do not sign up before the expiration date, you must request a new code. · [email or web address] Access Code: [email or web address] Expires: 10/15/2017  2:19 PM 
 
4. Enter the last four digits of your Social Security Number (xxxx) and Date of Birth (mm/dd/yyyy) as indicated and click Submit. You will be taken to the next sign-up page. 5. Create a [employer] ID. This will be your [employer] login ID and cannot be changed, so think of one that is secure and easy to remember. 6. Create a [employer] password. You can change your password at any time. 7. Enter your Password Reset Question and Answer. This can be used at a later time if you forget your password. 8. Enter your e-mail address. You will receive e-mail notification when new information is available in [address] 9. Click Sign Up. You can now view and download portions of your medical record. 10. Click the Download Summary menu link to download a portable copy of your medical information. If you have questions, please visit the Frequently Asked Questions section of the [email or web address] website. Remember, [email or web address] is NOT to be used for urgent needs. For medical emergencies, dial 911. Now available from your iPhone and Android! Please provide this summary of care documentation to your next provider. Your primary care clinician is listed as Eduard La. If you have any questions after today's visit, please call [phone].

## 2017-08-08 NOTE — PATIENT INSTRUCTIONS
Hair Loss From Alopecia Areata: Care Instructions  Your Care Instructions    Alopecia areata is a type of hair loss that affects the hair on the scalp or other areas of the body. It's a problem that can go away for some time and then come back. This condition is most common in people who are younger than 21, but it can happen to children and adults of any age. Hair loss can affect how you feel about yourself. Your hair may fall out in clumps and grow back over time. In rare cases, a person with alopecia may lose all body hair. The pattern of hair loss and growth is different for everyone. You can treat alopecia with medicine, but treatment does not always work. You may have shots of medicine in your scalp or skin, take pills, or put the medicine on your scalp or skin. Or you may decide to wait and see whether your hair grows again before trying medicine. Because hair loss is upsetting for most people, seek support from family and friends. Talk to a counselor or other professional if you need more help. Follow-up care is a key part of your treatment and safety. Be sure to make and go to all appointments, and call your doctor if you are having problems. It's also a good idea to know your test results and keep a list of the medicines you take. How can you care for yourself at home? · If you decide to treat your hair loss, use medicines exactly as prescribed. Call your doctor if you think you are having a problem with your medicine. · If you want to cover your scalp, you can use hats, scarves, or other head coverings. Or you may want to wear a hairpiece or a wig. · Try hair care products and styling techniques. Hair care products or perms may make hair appear thicker. You can use dyes to color the scalp. But long-term use of perms or dyes may lead to more hair loss. · Talk to your doctor if you are very upset about your hair loss. You can get counseling to help you cope with the condition.   When should you call for help? Watch closely for changes in your health, and be sure to contact your doctor if:  · You do not get better as expected. Where can you learn more? Go to http://rupert-arnold.info/. Enter G226 in the search box to learn more about \"Hair Loss From Alopecia Areata: Care Instructions. \"  Current as of: October 13, 2016  Content Version: 11.3  © 7511-5488 [employer]. Care instructions adapted under license by [employer] (which disclaims liability or warranty for this information). If you have questions about a medical condition or this instruction, always ask your healthcare professional. [address] any warranty or liability for your use of this information.

## 2017-08-08 NOTE — PROGRESS NOTES
Pt here c/o ongoing depression. States that she has had no improvement since taking anti depressant. Requesting an order to have a colonoscopy and a handicap parking pass for arthritis in knee. Also c/o a bald spot on head. Pt reports that her last colonoscopy was 5 years, was told to return in 5 years, pt reports that she has difficulty passing her stool, sometimes has to help manually. Pt can not take laxative due to accidents after taking the medication. Pt also reports ongoing depression despite taking lexapro, reports low energy, just wants to sit at home and watch TV. Pt saw a rheumatologist, was advised that STEVE was a false positive. Pt was released from her care. Pt also reports a bald spot that occurred after hitting her head. Subjective: (As above and below)     Chief Complaint   Patient presents with    Depression     she is a 78y.o. year old female who presents for evaluation. Reviewed PmHx, RxHx, FmHx, SocHx, AllgHx and updated in chart. Review of Systems - negative except as listed above    Objective:     Vitals:    08/08/17 1130   BP: 144/78   Pulse: 60   Resp: 18   Temp: 97.7 °F (36.5 °C)   TempSrc: Oral   SpO2: 98%   Weight: 172 lb (78 kg)   Height: 5' 5\" (1.651 m)     Physical Examination: General appearance - alert, well appearing, and in no distress  Mental status - normal mood, behavior, speech, dress, motor activity, and thought processes  Mouth - mucous membranes moist, pharynx normal without lesions  Chest - clear to auscultation, no wheezes, rales or rhonchi, symmetric air entry  Heart - normal rate, regular rhythm, normal S1, S2, no murmurs, rubs, clicks or gallops  Musculoskeletal - right knee pain with full extension, normal ROM with discomfort  Extremities - peripheral pulses normal, no pedal edema, no clubbing or cyanosis    Assessment/ Plan:   1.  Difficulty passing stool  -refer for colonoscopy  - REFERRAL TO GASTROENTEROLOGY    2. Mood disorder [facility])  -add wellbutrin to help with energy    3. Chronic pain of right knee  - REFERRAL TO ORTHOPEDICS    4. Alopecia areata  -reviewed course of symptoms and expected improvement       I have discussed the diagnosis with the patient and the intended plan as seen in the above orders. The patient has received an after-visit summary and questions were answered concerning future plans.      Medication Side Effects and Warnings were discussed with patient: yes  Patient Labs were reviewed: yes  Patient Past Records were reviewed:  yes    Jb Blue M.D.

## 2017-08-28 DIAGNOSIS — I10 ESSENTIAL HYPERTENSION: ICD-10-CM

## 2017-08-28 DIAGNOSIS — E78.5 DYSLIPIDEMIA: ICD-10-CM

## 2017-08-28 DIAGNOSIS — I25.10 CORONARY ARTERY DISEASE INVOLVING NATIVE HEART WITHOUT ANGINA PECTORIS, UNSPECIFIED VESSEL OR LESION TYPE: ICD-10-CM

## 2017-08-28 RX ORDER — LOSARTAN POTASSIUM 50 MG/1
TABLET ORAL
Qty: 60 TAB | Refills: 3 | Status: SHIPPED | OUTPATIENT
Start: 2017-08-28 | End: 2017-10-19 | Stop reason: SDUPTHER

## 2017-08-28 RX ORDER — PRASUGREL HYDROCHLORIDE 10 MG/1
TABLET, COATED ORAL
Qty: 30 TAB | Refills: 2 | Status: SHIPPED | OUTPATIENT
Start: 2017-08-28 | End: 2017-10-19 | Stop reason: SDUPTHER

## 2017-10-19 DIAGNOSIS — E78.5 DYSLIPIDEMIA: ICD-10-CM

## 2017-10-19 DIAGNOSIS — I10 ESSENTIAL HYPERTENSION: ICD-10-CM

## 2017-10-19 DIAGNOSIS — I25.10 CORONARY ARTERY DISEASE INVOLVING NATIVE HEART WITHOUT ANGINA PECTORIS, UNSPECIFIED VESSEL OR LESION TYPE: ICD-10-CM

## 2017-10-19 RX ORDER — ESCITALOPRAM OXALATE 10 MG/1
10 TABLET ORAL DAILY
Qty: 90 TAB | Refills: 3 | Status: SHIPPED | OUTPATIENT
Start: 2017-10-19 | End: 2019-06-25 | Stop reason: SDUPTHER

## 2017-10-19 RX ORDER — METOPROLOL TARTRATE 50 MG/1
50 TABLET ORAL 2 TIMES DAILY
Qty: 180 TAB | Refills: 3 | Status: SHIPPED | OUTPATIENT
Start: 2017-10-19 | End: 2018-09-23 | Stop reason: SDUPTHER

## 2017-10-19 RX ORDER — PRASUGREL HYDROCHLORIDE 10 MG/1
TABLET, COATED ORAL
Qty: 90 TAB | Refills: 3 | Status: SHIPPED | OUTPATIENT
Start: 2017-10-19 | End: 2018-04-16 | Stop reason: ALTCHOICE

## 2017-10-19 RX ORDER — BUPROPION HYDROCHLORIDE 150 MG/1
150 TABLET ORAL
Qty: 90 TAB | Refills: 3 | Status: SHIPPED | OUTPATIENT
Start: 2017-10-19 | End: 2019-06-25 | Stop reason: ALTCHOICE

## 2017-10-19 RX ORDER — ATORVASTATIN CALCIUM 20 MG/1
20 TABLET, FILM COATED ORAL DAILY
Qty: 90 TAB | Refills: 3 | Status: SHIPPED | OUTPATIENT
Start: 2017-10-19 | End: 2017-10-24 | Stop reason: ALTCHOICE

## 2017-10-19 RX ORDER — LOSARTAN POTASSIUM 100 MG/1
TABLET ORAL
Qty: 90 TAB | Refills: 3 | Status: SHIPPED | OUTPATIENT
Start: 2017-10-19 | End: 2017-10-24 | Stop reason: ALTCHOICE

## 2017-10-19 RX ORDER — DILTIAZEM HYDROCHLORIDE 120 MG/1
120 CAPSULE, COATED, EXTENDED RELEASE ORAL DAILY
Qty: 90 CAP | Refills: 3 | Status: SHIPPED | OUTPATIENT
Start: 2017-10-19 | End: 2018-09-23 | Stop reason: SDUPTHER

## 2017-10-23 RX ORDER — ATORVASTATIN CALCIUM 40 MG/1
TABLET, FILM COATED ORAL
Qty: 30 TAB | Refills: 3 | Status: SHIPPED | OUTPATIENT
Start: 2017-10-23 | End: 2020-02-24 | Stop reason: SDUPTHER

## 2017-10-24 ENCOUNTER — OFFICE VISIT (OUTPATIENT)
Dept: FAMILY MEDICINE CLINIC | Age: 67
End: 2017-10-24

## 2017-10-24 VITALS
RESPIRATION RATE: 16 BRPM | HEIGHT: 65 IN | SYSTOLIC BLOOD PRESSURE: 118 MMHG | HEART RATE: 59 BPM | DIASTOLIC BLOOD PRESSURE: 82 MMHG | WEIGHT: 170.13 LBS | BODY MASS INDEX: 28.35 KG/M2 | OXYGEN SATURATION: 100 % | TEMPERATURE: 97.9 F

## 2017-10-24 DIAGNOSIS — R73.01 IMPAIRED FASTING BLOOD SUGAR: ICD-10-CM

## 2017-10-24 DIAGNOSIS — E78.5 DYSLIPIDEMIA: ICD-10-CM

## 2017-10-24 DIAGNOSIS — I10 ESSENTIAL HYPERTENSION: Primary | ICD-10-CM

## 2017-10-24 DIAGNOSIS — Z23 ENCOUNTER FOR IMMUNIZATION: ICD-10-CM

## 2017-10-24 RX ORDER — LOSARTAN POTASSIUM 50 MG/1
100 TABLET ORAL
COMMUNITY
Start: 2017-10-01 | End: 2019-01-31 | Stop reason: ALTCHOICE

## 2017-10-24 NOTE — PROGRESS NOTES
1. Have you been to the ER, urgent care clinic since your last visit? Hospitalized since your last visit? No    2. Have you seen or consulted any other health care providers outside of the [address] [place] [address] since your last visit? Include any pap smears or colon screening.  No    Chief Complaint   Patient presents with    Follow-up     2 mo f/u, med ck    Labs     fasting

## 2017-10-24 NOTE — MR AVS SNAPSHOT
Visit Information Date & Time Provider Department Dept. Phone Encounter #  
 10/24/2017 11:00 AM Suzy Anderson NP [address] [facility] [phone] [id number] Follow-up Instructions Return for well exam after 1/10/18 with labs. Upcoming Health Maintenance Date Due COLONOSCOPY 9/25/1967 Pneumococcal 65+ Low/Medium Risk (2 of 2 - PCV13) 11/10/2015 GLAUCOMA SCREENING Q2Y 5/14/2016 INFLUENZA AGE 9 TO ADULT 8/1/2017 MEDICARE YEARLY EXAM 1/11/2018 BREAST CANCER SCRN MAMMOGRAM 3/20/2019 DTaP/Tdap/Td series (2 - Td) 11/10/2024 Allergies as of 10/24/2017  Review Complete On: 10/24/2017 By: Kaylee Javier LPN Severity Noted Reaction Type Reactions Pcn [Penicillins]  07/02/2010   Side Effect Unknown (comments) Current Immunizations  Reviewed on 12/1/2015 Name Date Influenza High Dose Vaccine PF 1/10/2017 Influenza Vaccine 11/17/2014 Influenza Vaccine (Quad) PF 12/1/2015 Pneumococcal Polysaccharide (PPSV-23) 11/10/2014 Tdap 11/10/2014 Not reviewed this visit You Were Diagnosed With   
  
 Codes Comments Essential hypertension    -  Primary ICD-10-CM: I10 
ICD-9-CM: 401.9 Dyslipidemia     ICD-10-CM: E78.5 ICD-9-CM: 272.4 Impaired fasting blood sugar     ICD-10-CM: R73.01 
ICD-9-CM: 790.21 Vitals BP Pulse Temp Resp Height(growth percentile) Weight(growth percentile) 118/82 (!) 59 97.9 °F (36.6 °C) (Oral) 16 5' 5\" (1.651 m) 170 lb 2 oz (77.2 kg) SpO2 BMI OB Status Smoking Status 100% 28.31 kg/m2 Postmenopausal Current Every Day Smoker Vitals History BMI and BSA Data Body Mass Index Body Surface Area  
 28.31 kg/m 2 1.88 m 2 Preferred Pharmacy Pharmacy Name Phone [id number] [address] [phone] Your Updated Medication List  
  
   
This list is accurate as of: 10/24/17 11:37 AM.  Always use your most recent med list.  
  
  
  
  
 aspirin 81 mg chewable tablet Take 1 Tab by mouth daily. atorvastatin 40 mg tablet Commonly known as:  LIPITOR  
TAKE 1 TABLET BY MOUTH EVERY DAY AT 5 PM  
  
 buPROPion  mg tablet Commonly known as:  Cass Chris Take 1 Tab by mouth every morning. CALCIUM 600 WITH VITAMIN D3 600 mg(1,500mg) -400 unit Cap Generic drug:  Calcium-Cholecalciferol (D3) Take  by mouth. dilTIAZem  mg ER capsule Commonly known as:  CARDIZEM CD Take 1 Cap by mouth daily. EFFIENT 10 mg tablet Generic drug:  prasugrel TAKE 1 TABLET BY MOUTH EVERY DAY  
  
 escitalopram oxalate 10 mg tablet Commonly known as:  [place] Take 1 Tab by mouth daily. fish oil-dha-epa 1,200-144-216 mg Cap Take  by mouth daily. losartan 50 mg tablet Commonly known as:  COZAAR  
  
 metoprolol tartrate 50 mg tablet Commonly known as:  LOPRESSOR Take 1 Tab by mouth two (2) times a day. We Performed the Following CBC WITH AUTOMATED DIFF [73522 CPT(R)] HEMOGLOBIN A1C WITH EAG [58538 CPT(R)] LIPID PANEL [98548 CPT(R)] METABOLIC PANEL, COMPREHENSIVE [41772 CPT(R)] Follow-up Instructions Return for well exam after 1/10/18 with labs. Introducing [facility]! Alvin Calhoun introduces [employer] patient portal. Now you can access parts of your medical record, email your doctor's office, and request medication refills online. 1. In your internet browser, go to [email or web address] 2. Click on the First Time User? Click Here link in the Sign In box. You will see the New Member Sign Up page. 3. Enter your [employer] Access Code exactly as it appears below. You will not need to use this code after youve completed the sign-up process. If you do not sign up before the expiration date, you must request a new code. · [employer] Access Code: [email or web address] Expires: 1/22/2018 11:36 AM 
 
 4. Enter the last four digits of your Social Security Number (xxxx) and Date of Birth (mm/dd/yyyy) as indicated and click Submit. You will be taken to the next sign-up page. 5. Create a [employer] ID. This will be your [employer] login ID and cannot be changed, so think of one that is secure and easy to remember. 6. Create a [employer] password. You can change your password at any time. 7. Enter your Password Reset Question and Answer. This can be used at a later time if you forget your password. 8. Enter your e-mail address. You will receive e-mail notification when new information is available in [address] 9. Click Sign Up. You can now view and download portions of your medical record. 10. Click the Download Summary menu link to download a portable copy of your medical information. If you have questions, please visit the Frequently Asked Questions section of the [employer] website. Remember, [employer] is NOT to be used for urgent needs. For medical emergencies, dial 911. Now available from your iPhone and Android! Please provide this summary of care documentation to your next provider. Your primary care clinician is listed as Sparkle Ortiz. If you have any questions after today's visit, please call [phone].

## 2017-10-25 DIAGNOSIS — E87.5 HYPERKALEMIA: Primary | ICD-10-CM

## 2017-10-25 LAB
ALBUMIN SERPL-MCNC: 4.4 G/DL (ref 3.6–4.8)
ALBUMIN/GLOB SERPL: 1.3 {RATIO} (ref 1.2–2.2)
ALP SERPL-CCNC: 69 IU/L (ref 39–117)
ALT SERPL-CCNC: 33 IU/L (ref 0–32)
AST SERPL-CCNC: 38 IU/L (ref 0–40)
BASOPHILS # BLD AUTO: 0 X10E3/UL (ref 0–0.2)
BASOPHILS NFR BLD AUTO: 0 %
BILIRUB SERPL-MCNC: 0.6 MG/DL (ref 0–1.2)
BUN SERPL-MCNC: 20 MG/DL (ref 8–27)
BUN/CREAT SERPL: 17 (ref 12–28)
CALCIUM SERPL-MCNC: 10.3 MG/DL (ref 8.7–10.3)
CHLORIDE SERPL-SCNC: 100 MMOL/L (ref 96–106)
CHOLEST SERPL-MCNC: 142 MG/DL (ref 100–199)
CO2 SERPL-SCNC: 25 MMOL/L (ref 18–29)
CREAT SERPL-MCNC: 1.2 MG/DL (ref 0.57–1)
EOSINOPHIL # BLD AUTO: 0.6 X10E3/UL (ref 0–0.4)
EOSINOPHIL NFR BLD AUTO: 8 %
ERYTHROCYTE [DISTWIDTH] IN BLOOD BY AUTOMATED COUNT: 15.2 % (ref 12.3–15.4)
EST. AVERAGE GLUCOSE BLD GHB EST-MCNC: 120 MG/DL
GFR SERPLBLD CREATININE-BSD FMLA CKD-EPI: 47 ML/MIN/1.73
GFR SERPLBLD CREATININE-BSD FMLA CKD-EPI: 54 ML/MIN/1.73
GLOBULIN SER CALC-MCNC: 3.4 G/DL (ref 1.5–4.5)
GLUCOSE SERPL-MCNC: 93 MG/DL (ref 65–99)
HBA1C MFR BLD: 5.8 % (ref 4.8–5.6)
HCT VFR BLD AUTO: 35.9 % (ref 34–46.6)
HDLC SERPL-MCNC: 42 MG/DL
HGB BLD-MCNC: 11.7 G/DL (ref 11.1–15.9)
IMM GRANULOCYTES # BLD: 0 X10E3/UL (ref 0–0.1)
IMM GRANULOCYTES NFR BLD: 0 %
INTERPRETATION, 910389: NORMAL
INTERPRETATION: NORMAL
LDLC SERPL CALC-MCNC: 79 MG/DL (ref 0–99)
LYMPHOCYTES # BLD AUTO: 1.8 X10E3/UL (ref 0.7–3.1)
LYMPHOCYTES NFR BLD AUTO: 24 %
MCH RBC QN AUTO: 30.4 PG (ref 26.6–33)
MCHC RBC AUTO-ENTMCNC: 32.6 G/DL (ref 31.5–35.7)
MCV RBC AUTO: 93 FL (ref 79–97)
MONOCYTES # BLD AUTO: 0.5 X10E3/UL (ref 0.1–0.9)
MONOCYTES NFR BLD AUTO: 6 %
NEUTROPHILS # BLD AUTO: 4.7 X10E3/UL (ref 1.4–7)
NEUTROPHILS NFR BLD AUTO: 62 %
PDF IMAGE, 910387: NORMAL
PLATELET # BLD AUTO: 166 X10E3/UL (ref 150–379)
POTASSIUM SERPL-SCNC: 6.4 MMOL/L (ref 3.5–5.2)
PROT SERPL-MCNC: 7.8 G/DL (ref 6–8.5)
RBC # BLD AUTO: 3.85 X10E6/UL (ref 3.77–5.28)
SODIUM SERPL-SCNC: 138 MMOL/L (ref 134–144)
TRIGL SERPL-MCNC: 105 MG/DL (ref 0–149)
VLDLC SERPL CALC-MCNC: 21 MG/DL (ref 5–40)
WBC # BLD AUTO: 7.6 X10E3/UL (ref 3.4–10.8)

## 2017-10-25 NOTE — PROGRESS NOTES
Pt notified (confirmed x 2). Patient verbalized understanding. Pt is currently in [facility] 19 and will come in Thursday 10/26/17 in am before work for lab only visit to recheck labs.

## 2017-10-25 NOTE — PROGRESS NOTES
Please have her come in this am to have stat lab for BMP repeated, shows K+ extremely high and abn kidney functions, neither of which have ever happened. Dont want to treat the problem until double check - may be lab error.  Sofia Gonzalez

## 2017-10-25 NOTE — PROGRESS NOTES
HISTORY OF PRESENT ILLNESS  Memo Powell is a 75 y.o. female. HPI  Cardiovascular Review:  The patient has hypertension and hyperlipidemia. Diet and Lifestyle: generally follows a low fat low cholesterol diet, generally follows a low sodium diet, sedentary, nonsmoker  Home BP Monitoring: is not measured at home. Pertinent ROS: taking medications as instructed, no medication side effects noted, no TIA's, no chest pain on exertion, no dyspnea on exertion, no swelling of ankles. Diabetes Mellitus:  She has diabetes mellitus, and  hypertension and hyperlipidemia. Diabetic ROS - medication compliance: noncompliant some of the time, diabetic diet compliance: compliant all of the time, home glucose monitoring: is not performed. Lab review: orders written for new lab studies as appropriate; see orders. Flu shot:  Patient is here today for flu shot. Denies any previous adr/se to the flu shot, no egg allergy and no recent illness or fever. Patient Active Problem List    Diagnosis Date Noted    Abnormal EKG 05/01/2017    Chest pain 05/01/2017    Essential hypertension 05/01/2017    Hypertension     Dyslipidemia     HSV-2 (herpes simplex virus 2) infection 01/31/2017    Closed fracture of right patella 02/02/2016    Osteopenia 12/15/2014    Chronic hepatitis C ([facility] [address] 11/10/2014     Current Outpatient Prescriptions   Medication Sig Dispense Refill    losartan (COZAAR) 50 mg tablet       atorvastatin (LIPITOR) 40 mg tablet TAKE 1 TABLET BY MOUTH EVERY DAY AT 5 PM 30 Tab 3    dilTIAZem CD (CARDIZEM CD) 120 mg ER capsule Take 1 Cap by mouth daily. 90 Cap 3    EFFIENT 10 mg tablet TAKE 1 TABLET BY MOUTH EVERY DAY 90 Tab 3    metoprolol tartrate (LOPRESSOR) 50 mg tablet Take 1 Tab by mouth two (2) times a day. 180 Tab 3    buPROPion XL (WELLBUTRIN XL) 150 mg tablet Take 1 Tab by mouth every morning. 90 Tab 3    escitalopram oxalate (LEXAPRO) 10 mg tablet Take 1 Tab by mouth daily.  90 Tab 3    Calcium-Cholecalciferol, D3, (CALCIUM 600 WITH VITAMIN D3) 600 mg(1,500mg) -400 unit cap Take  by mouth.  fish oil-dha-epa 1,200-144-216 mg cap Take  by mouth daily.  aspirin 81 mg chewable tablet Take 1 Tab by mouth daily. 30 Tab 0     Family History   Problem Relation Age of Onset    Elevated Lipids Mother     Cancer Father      LUNG AND THROAT    Parkinson's Disease Father     Breast Cancer Paternal Aunt      Social History   Substance Use Topics    Smoking status: Current Every Day Smoker     Packs/day: 0.10    Smokeless tobacco: Never Used      Comment: smokes 3-4 cigarettes per day x 25 years    Alcohol use Yes      Comment: about 5 drinks per week       ROS  A comprehensive review of system was obtained and negative except findings in the HPI    Visit Vitals    /82    Pulse (!) 59    Temp 97.9 °F (36.6 °C) (Oral)    Resp 16    Ht 5' 5\" (1.651 m)    Wt 170 lb 2 oz (77.2 kg)    SpO2 100%    BMI 28.31 kg/m2     Physical Exam   Constitutional: She is oriented to person, place, and time. She appears well-developed and well-nourished. Neck: No JVD present. Cardiovascular: Normal rate, regular rhythm and intact distal pulses. Exam reveals no gallop and no friction rub. No murmur heard. Pulmonary/Chest: Effort normal and breath sounds normal. No respiratory distress. She has no wheezes. Musculoskeletal: She exhibits no edema. Neurological: She is alert and oriented to person, place, and time. Skin: Skin is warm. Nursing note and vitals reviewed. ASSESSMENT and PLAN  Encounter Diagnoses   Name Primary?     Essential hypertension Yes    Dyslipidemia     Impaired fasting blood sugar     Encounter for immunization      Orders Placed This Encounter    Influenza virus vaccine (FLUZONE HIGH-DOSE) 65 years and older (26964)    CBC WITH AUTOMATED DIFF    LIPID PANEL    METABOLIC PANEL, COMPREHENSIVE    HEMOGLOBIN A1C WITH EAG    losartan (COZAAR) 50 mg tablet Labs updated today and refills  Flu shot given  I have discussed the diagnosis with the patient and the intended plan as seen in the above orders. The patient has received an after-visit summary and questions were answered concerning future plans. Patient conveyed understanding of the plan at the time of the visit.     Dian Real, MSN, ANP  10/24/2017

## 2017-10-26 DIAGNOSIS — E87.5 HYPERKALEMIA: Primary | ICD-10-CM

## 2017-10-26 RX ORDER — SODIUM POLYSTYRENE SULFONATE 15 G/60ML
15 SUSPENSION ORAL; RECTAL
Qty: 60 ML | Refills: 0 | Status: SHIPPED | OUTPATIENT
Start: 2017-10-26 | End: 2017-10-26

## 2017-10-27 LAB
BUN SERPL-MCNC: 17 MG/DL (ref 8–27)
BUN/CREAT SERPL: 17 (ref 12–28)
CALCIUM SERPL-MCNC: 9.9 MG/DL (ref 8.7–10.3)
CHLORIDE SERPL-SCNC: 102 MMOL/L (ref 96–106)
CO2 SERPL-SCNC: 25 MMOL/L (ref 18–29)
CREAT SERPL-MCNC: 0.98 MG/DL (ref 0.57–1)
GFR SERPLBLD CREATININE-BSD FMLA CKD-EPI: 59 ML/MIN/1.73
GFR SERPLBLD CREATININE-BSD FMLA CKD-EPI: 69 ML/MIN/1.73
GLUCOSE SERPL-MCNC: 131 MG/DL (ref 65–99)
INTERPRETATION: NORMAL
POTASSIUM SERPL-SCNC: 5.8 MMOL/L (ref 3.5–5.2)
SODIUM SERPL-SCNC: 140 MMOL/L (ref 134–144)

## 2017-10-28 LAB — POTASSIUM SERPL-SCNC: 4.8 MMOL/L (ref 3.5–5.2)

## 2017-12-04 ENCOUNTER — OFFICE VISIT (OUTPATIENT)
Dept: CARDIOLOGY CLINIC | Age: 67
End: 2017-12-04

## 2017-12-04 VITALS
HEART RATE: 68 BPM | WEIGHT: 174 LBS | DIASTOLIC BLOOD PRESSURE: 58 MMHG | BODY MASS INDEX: 28.99 KG/M2 | SYSTOLIC BLOOD PRESSURE: 130 MMHG | HEIGHT: 65 IN

## 2017-12-04 DIAGNOSIS — I10 ESSENTIAL HYPERTENSION: ICD-10-CM

## 2017-12-04 DIAGNOSIS — E78.5 DYSLIPIDEMIA: ICD-10-CM

## 2017-12-04 DIAGNOSIS — I25.10 CORONARY ARTERY DISEASE INVOLVING NATIVE HEART WITHOUT ANGINA PECTORIS, UNSPECIFIED VESSEL OR LESION TYPE: Primary | ICD-10-CM

## 2017-12-04 NOTE — PROGRESS NOTES
Visit Vitals    /58    Pulse 68    Ht 5' 5\" (1.651 m)    Wt 174 lb (78.9 kg)    BMI 28.96 kg/m2     Medication changes for this OV VO Dr Sheridan Mckeon

## 2017-12-04 NOTE — MR AVS SNAPSHOT
Visit Information Date & Time Provider Department Dept. Phone Encounter #  
 12/4/2017  2:00 PM Sara Krueger MD [facility] Cindi Almendarez [phone] [id number] Your Appointments 1/16/2018 10:15 AM  
ESTABLISHED PATIENT with Kamilla Ye, NP [address] [facility] ([address]) Appt Note: follow up  
 [address] [id number] [phone]  
  
   
 [address] [address] Upcoming Health Maintenance Date Due COLONOSCOPY 9/25/1967 Pneumococcal 65+ Low/Medium Risk (2 of 2 - PCV13) 11/10/2015 GLAUCOMA SCREENING Q2Y 5/14/2016 MEDICARE YEARLY EXAM 1/11/2018 BREAST CANCER SCRN MAMMOGRAM 3/20/2019 DTaP/Tdap/Td series (2 - Td) 11/10/2024 Allergies as of 12/4/2017  Review Complete On: 12/4/2017 By: Sara Krueger MD  
  
 Severity Noted Reaction Type Reactions Pcn [Penicillins]  07/02/2010   Side Effect Unknown (comments) Current Immunizations  Reviewed on 12/1/2015 Name Date Influenza High Dose Vaccine PF 10/24/2017, 1/10/2017 Influenza Vaccine 11/17/2014 Influenza Vaccine (Quad) PF 12/1/2015 Pneumococcal Polysaccharide (PPSV-23) 11/10/2014 Tdap 11/10/2014 Not reviewed this visit You Were Diagnosed With   
  
 Codes Comments Coronary artery disease involving native heart without angina pectoris, unspecified vessel or lesion type    -  Primary ICD-10-CM: I25.10 ICD-9-CM: 414.01 Essential hypertension     ICD-10-CM: I10 
ICD-9-CM: 401.9 Dyslipidemia     ICD-10-CM: E78.5 ICD-9-CM: 272.4 Vitals BP Pulse Height(growth percentile) Weight(growth percentile) BMI OB Status 130/58 68 5' 5\" (1.651 m) 174 lb (78.9 kg) 28.96 kg/m2 Postmenopausal  
 Smoking Status Current Every Day Smoker Vitals History BMI and BSA Data Body Mass Index Body Surface Area  
 28.96 kg/m 2 1.9 m 2 Preferred Pharmacy Pharmacy Name Phone [id number]- [address] [phone] Your Updated Medication List  
  
   
This list is accurate as of: 12/4/17  2:38 PM.  Always use your most recent med list.  
  
  
  
  
 aspirin 81 mg chewable tablet Take 1 Tab by mouth daily. atorvastatin 40 mg tablet Commonly known as:  LIPITOR  
TAKE 1 TABLET BY MOUTH EVERY DAY AT 5 PM  
  
 buPROPion  mg tablet Commonly known as:  Syble Ko Take 1 Tab by mouth every morning. CALCIUM 600 WITH VITAMIN D3 600 mg(1,500mg) -400 unit Cap Generic drug:  Calcium-Cholecalciferol (D3) Take  by mouth. dilTIAZem  mg ER capsule Commonly known as:  CARDIZEM CD Take 1 Cap by mouth daily. EFFIENT 10 mg tablet Generic drug:  prasugrel TAKE 1 TABLET BY MOUTH EVERY DAY  
  
 escitalopram oxalate 10 mg tablet Commonly known as:  [place]s Take 1 Tab by mouth daily. losartan 50 mg tablet Commonly known as:  COZAAR  
  
 metoprolol tartrate 50 mg tablet Commonly known as:  LOPRESSOR Take 1 Tab by mouth two (2) times a day. Introducing [facility]! Vickey Gregory introduces [employer] patient portal. Now you can access parts of your medical record, email your doctor's office, and request medication refills online. 1. In your internet browser, go to [email or web address] 2. Click on the First Time User? Click Here link in the Sign In box. You will see the New Member Sign Up page. 3. Enter your [employer] Access Code exactly as it appears below. You will not need to use this code after youve completed the sign-up process. If you do not sign up before the expiration date, you must request a new code. · [employer] Access Code: [email or web address] Expires: 1/22/2018 10:36 AM 
 
4. Enter the last four digits of your Social Security Number (xxxx) and Date of Birth (mm/dd/yyyy) as indicated and click Submit.  You will be taken to the next sign-up page. 5. Create a [employer] ID. This will be your [employer] login ID and cannot be changed, so think of one that is secure and easy to remember. 6. Create a [employer] password. You can change your password at any time. 7. Enter your Password Reset Question and Answer. This can be used at a later time if you forget your password. 8. Enter your e-mail address. You will receive e-mail notification when new information is available in [address] 9. Click Sign Up. You can now view and download portions of your medical record. 10. Click the Download Summary menu link to download a portable copy of your medical information. If you have questions, please visit the Frequently Asked Questions section of the [employer] website. Remember, [employer] is NOT to be used for urgent needs. For medical emergencies, dial 911. Now available from your iPhone and Android! Please provide this summary of care documentation to your next provider. Your primary care clinician is listed as Edis Pierce. If you have any questions after today's visit, please call [phone].

## 2017-12-04 NOTE — PROGRESS NOTES
Lisset Gaxiola MD. [facility] [place]              Patient: Heidi Wolf  : 1950      Today's Date: 2017        HISTORY OF PRESENT ILLNESS:     History of Present Illness:    Patient is a 75 female with CAD s/p RCA stenting, tobacco abuse, hx Hep C s/p treatment with complete response, PAD, HTN, who presents for follow up. She has had no chest pain. No dyspnea. No leg swelling. No PND. No palpitations. No orthopnea. Has cut back to 1-2 cigarettes per day. PAST MEDICAL HISTORY:     Past Medical History:   Diagnosis Date    Arthritis     Asthma     in  - no longer a problem    CAD (coronary artery disease)     NSTEMI with PCI (LETICIA)  to RCA 17     Depression     Dyslipidemia     Hypertension     Ill-defined condition     Hepatitis C    Knee injury     Liver disease     hep c--had treatment, states no longer present    Nausea & vomiting     Seizures (HCC)     Smoker        Past Surgical History:   Procedure Laterality Date    HX ORTHOPAEDIC      foot (right)    VASCULAR SURGERY PROCEDURE UNLIST      left leg varicose vein stripping           MEDICATIONS:     Current Outpatient Prescriptions   Medication Sig Dispense Refill    losartan (COZAAR) 50 mg tablet       atorvastatin (LIPITOR) 40 mg tablet TAKE 1 TABLET BY MOUTH EVERY DAY AT 5 PM 30 Tab 3    dilTIAZem CD (CARDIZEM CD) 120 mg ER capsule Take 1 Cap by mouth daily. 90 Cap 3    EFFIENT 10 mg tablet TAKE 1 TABLET BY MOUTH EVERY DAY 90 Tab 3    metoprolol tartrate (LOPRESSOR) 50 mg tablet Take 1 Tab by mouth two (2) times a day. 180 Tab 3    buPROPion XL (WELLBUTRIN XL) 150 mg tablet Take 1 Tab by mouth every morning. 90 Tab 3    escitalopram oxalate (LEXAPRO) 10 mg tablet Take 1 Tab by mouth daily. 90 Tab 3    Calcium-Cholecalciferol, D3, (CALCIUM 600 WITH VITAMIN D3) 600 mg(1,500mg) -400 unit cap Take  by mouth.  aspirin 81 mg chewable tablet Take 1 Tab by mouth daily.  30 Tab 0 Allergies   Allergen Reactions    Pcn [Penicillins] Unknown (comments)             SOCIAL HISTORY:     Social History   Substance Use Topics    Smoking status: Current Every Day Smoker     Packs/day: 0.10    Smokeless tobacco: Never Used      Comment: smokes 3-4 cigarettes per day x 25 years    Alcohol use Yes      Comment: about 5 drinks per week         FAMILY HISTORY:     Family History   Problem Relation Age of Onset    Elevated Lipids Mother     Cancer Father      LUNG AND THROAT    Parkinson's Disease Father     Breast Cancer Paternal Aunt               REVIEW OF SYMPTOMS:       Review of Symptoms:  Constitutional: Negative for fever, chills  HEENT: Negative for nosebleeds, tinnitus, and vision changes. Respiratory: Negative for cough, wheezing  Cardiovascular: Negative for syncope, and PND. Gastrointestinal: Negative for abdominal pain, diarrhea, melena. Genitourinary: Negative for dysuria  Musculoskeletal: Negative for myalgias.  + right knee injury 2016  Skin: Negative for rash  Heme: No problems bleeding. Neurological: Negative for speech change and focal weakness. + tired, constipation                   PHYSICAL EXAM:       Physical Exam:  Visit Vitals    /58    Pulse 68    Ht 5' 5\" (1.651 m)    Wt 174 lb (78.9 kg)    BMI 28.96 kg/m2          Patient appears generally well, mood and affect are appropriate and pleasant. HEENT:  Hearing intact, non-icteric, normocephalic, atraumatic. Neck Exam: Supple, No JVD.  + left neck carotid bruits. Lung Exam: Clear to auscultation, even breath sounds. Cardiac Exam: Regular rate and rhythm with no murmur  Abdomen: Soft, non-tender, normal bowel sounds. No bruits or masses. Extremities: Moves all ext well. No lower extremity edema. Vascular: 2+ dorsalis pedis pulses bilaterally.   Psych: Appropriate affect  Neuro - Grossly intact                  LABS / OTHER STUDIES:           Lab Results   Component Value Date/Time    Sodium 140 10/26/2017 07:36 AM    Potassium 4.8 10/26/2017 01:17 PM    Chloride 102 10/26/2017 07:36 AM    CO2 25 10/26/2017 07:36 AM    Anion gap 9 01/18/2010 11:44 AM    Glucose 131 10/26/2017 07:36 AM    BUN 17 10/26/2017 07:36 AM    Creatinine 0.98 10/26/2017 07:36 AM    BUN/Creatinine ratio 17 10/26/2017 07:36 AM    GFR est AA 69 10/26/2017 07:36 AM    GFR est non-AA 59 10/26/2017 07:36 AM    Calcium 9.9 10/26/2017 07:36 AM    Bilirubin, total 0.6 10/24/2017 11:45 AM    AST (SGOT) 38 10/24/2017 11:45 AM    Alk. phosphatase 69 10/24/2017 11:45 AM    Protein, total 7.8 10/24/2017 11:45 AM    Albumin 4.4 10/24/2017 11:45 AM    Globulin 4.0 01/18/2010 11:44 AM    A-G Ratio 1.3 10/24/2017 11:45 AM    ALT (SGPT) 33 10/24/2017 11:45 AM       Lab Results   Component Value Date/Time    WBC 7.6 10/24/2017 11:45 AM    HGB 11.7 10/24/2017 11:45 AM    HCT 35.9 10/24/2017 11:45 AM    PLATELET 911 56/33/3915 11:45 AM    MCV 93 10/24/2017 11:45 AM     Lab Results   Component Value Date/Time    Cholesterol, total 142 10/24/2017 11:45 AM    HDL Cholesterol 42 10/24/2017 11:45 AM    LDL, calculated 79 10/24/2017 11:45 AM    VLDL, calculated 21 10/24/2017 11:45 AM    Triglyceride 105 10/24/2017 11:45 AM    CHOL/HDL Ratio 3.3 01/18/2010 11:44 AM                 CARDIAC DIAGNOSTICS:       Cardiac Evaluation Includes:  Echo 5/8/17 - LVEF 55-60%, grade 1 diastology  Exercise Cardiolite 5/8/17 - walked 7:31 (10.1 METS), ischemic EKG changes (2 mm inferior ST depression).  + VALENTE but no CP.  Small, mild, partially reversible defects in anteroseptal and inferior walls.  SSS = 3, SRS = 1, SDS = 2.   Dilated LV with LVEF 37%  Carotid Dopplers 5/8/17 - mild 10-49% stenosis bilat     S/p right femoral thrombectomy and bovine patch plasty of right CFA/PFA 5/17/17 (s/p cath 5/12/17)      EKG 5/29/17 - NSR, LVH  Labs 5/29/17 - Hgb 10.9, LFT's OK     NSTEMI with PCI (LETICIA)  to RCA 5/12/17      Cath 5/12/17 - LM 20-30% narrowing, LAD calcified however good lumen, LCX calcified but OK lumen, RCA with 95% ostial stenosis and 100% proximal occlusion. Had PCI of RCA with LETICIA. LVEF 50-55% with inferobasal HK          EKG 4/4/17 - NSR, LVH                 ASSESSMENT AND PLAN:       Assessment and Plan:  1) CAD  - NSTEMI with PCI (LETICIA)  to RCA 5/12/17   - Cath 5/12/17 - LM 20-30% narrowing, LAD calcified however good lumen, LCX calcified but OK lumen, RCA with 95% ostial stenosis and 100% proximal occlusion. Had PCI of RCA with LETICIA. LVEF 50-55% with inferobasal HK  - cont Effient, ASA, BB, CCB and statin. - smoking cessation advised       2) Carotid Bruit   - mild carotid disease 5/17   - recheck in six months      3) PAD ?  - Sees        4) HTN  - continue multiple meds       5) Dyslipidemia  - cont statin - recent lipids OK       6) Chronic Leg pain   - has seen Rheum      7) See me in 6 months - may stop Effient at that time.  Patient expressed understanding of the plan - questions were answered.      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            Lisset Gaxiola MD, [address], [address]                [id number]  [address], [address] [place] [address], [address] [place] [address]  Ph: [phone]                                                             [phone]  Tequila Gonzalez

## 2017-12-21 DIAGNOSIS — E78.5 DYSLIPIDEMIA: ICD-10-CM

## 2017-12-21 DIAGNOSIS — I10 ESSENTIAL HYPERTENSION: ICD-10-CM

## 2017-12-21 DIAGNOSIS — I25.10 CORONARY ARTERY DISEASE INVOLVING NATIVE HEART WITHOUT ANGINA PECTORIS, UNSPECIFIED VESSEL OR LESION TYPE: ICD-10-CM

## 2017-12-22 RX ORDER — PRASUGREL HYDROCHLORIDE 10 MG/1
TABLET, COATED ORAL
Qty: 30 TAB | Refills: 2 | Status: SHIPPED | OUTPATIENT
Start: 2017-12-22 | End: 2018-04-16 | Stop reason: ALTCHOICE

## 2018-01-16 ENCOUNTER — OFFICE VISIT (OUTPATIENT)
Dept: FAMILY MEDICINE CLINIC | Age: 68
End: 2018-01-16

## 2018-01-16 VITALS — BODY MASS INDEX: 28.32 KG/M2 | HEIGHT: 65 IN | WEIGHT: 170 LBS

## 2018-04-16 ENCOUNTER — OFFICE VISIT (OUTPATIENT)
Dept: FAMILY MEDICINE CLINIC | Age: 68
End: 2018-04-16

## 2018-04-16 VITALS
HEART RATE: 83 BPM | SYSTOLIC BLOOD PRESSURE: 150 MMHG | RESPIRATION RATE: 19 BRPM | OXYGEN SATURATION: 98 % | BODY MASS INDEX: 29.26 KG/M2 | WEIGHT: 175.6 LBS | HEIGHT: 65 IN | TEMPERATURE: 98.9 F | DIASTOLIC BLOOD PRESSURE: 99 MMHG

## 2018-04-16 DIAGNOSIS — M20.42 HAMMER TOE OF LEFT FOOT: ICD-10-CM

## 2018-04-16 DIAGNOSIS — Z12.31 SCREENING MAMMOGRAM, ENCOUNTER FOR: ICD-10-CM

## 2018-04-16 DIAGNOSIS — I25.10 CORONARY ARTERY DISEASE INVOLVING NATIVE HEART WITHOUT ANGINA PECTORIS, UNSPECIFIED VESSEL OR LESION TYPE: ICD-10-CM

## 2018-04-16 DIAGNOSIS — Z00.00 WELL ADULT EXAM: Primary | ICD-10-CM

## 2018-04-16 DIAGNOSIS — E78.5 DYSLIPIDEMIA: ICD-10-CM

## 2018-04-16 DIAGNOSIS — I10 ESSENTIAL HYPERTENSION: ICD-10-CM

## 2018-04-16 DIAGNOSIS — M79.644 THUMB PAIN, RIGHT: ICD-10-CM

## 2018-04-16 DIAGNOSIS — K59.09 CHRONIC CONSTIPATION: ICD-10-CM

## 2018-04-16 RX ORDER — CLOPIDOGREL BISULFATE 75 MG/1
75 TABLET ORAL DAILY
COMMUNITY
Start: 2018-04-16 | End: 2020-01-20

## 2018-04-16 NOTE — MR AVS SNAPSHOT
[address] [address] 
[phone] Patient: Jessica Jorgensen MRN: [id number] MLC:[birth year] Visit Information Date & Time Provider Department Dept. Phone Encounter #  
 4/16/2018  9:45 AM Lissa Granda NP [facility] [phone] [id number] Upcoming Health Maintenance Date Due COLONOSCOPY 9/25/1967 Pneumococcal 65+ Low/Medium Risk (2 of 2 - PCV13) 11/10/2015 MEDICARE YEARLY EXAM 3/14/2018 BREAST CANCER SCRN MAMMOGRAM 3/20/2019 GLAUCOMA SCREENING Q2Y 2/1/2020 DTaP/Tdap/Td series (2 - Td) 11/10/2024 Allergies as of 4/16/2018  Review Complete On: 4/16/2018 By: Randall Arce LPN Severity Noted Reaction Type Reactions Pcn [Penicillins]  07/02/2010   Side Effect Unknown (comments) Current Immunizations  Reviewed on 12/1/2015 Name Date Influenza High Dose Vaccine PF 10/24/2017, 1/10/2017 Influenza Vaccine 11/17/2014 Influenza Vaccine (Quad) PF 12/1/2015 Pneumococcal Polysaccharide (PPSV-23) 11/10/2014 Tdap 11/10/2014 Not reviewed this visit You Were Diagnosed With   
  
 Codes Comments Well adult exam    -  Primary ICD-10-CM: Z00.00 ICD-9-CM: V70.0 Dyslipidemia     ICD-10-CM: E78.5 ICD-9-CM: 272.4 Essential hypertension     ICD-10-CM: I10 
ICD-9-CM: 401.9 Coronary artery disease involving native heart without angina pectoris, unspecified vessel or lesion type     ICD-10-CM: I25.10 ICD-9-CM: 414.01 Hammer toe of left foot     ICD-10-CM: M20.42 
ICD-9-CM: 735.4 Thumb pain, right     ICD-10-CM: Q16.840 ICD-9-CM: 729.5 Screening mammogram, encounter for     ICD-10-CM: Z12.31 
ICD-9-CM: V76.12 Chronic constipation     ICD-10-CM: K59.09 
ICD-9-CM: 564.00 Vitals BP Pulse Temp Resp Height(growth percentile) Weight(growth percentile)  (!) 150/99 (BP 1 Location: Left arm, BP Patient Position: Sitting) 83 98.9 °F (37.2 °C) (Oral) 19 5' 5\" (1.651 m) 175 lb 9.6 oz (79.7 kg) SpO2 BMI OB Status Smoking Status 98% 29.22 kg/m2 Postmenopausal Current Every Day Smoker Vitals History BMI and BSA Data Body Mass Index Body Surface Area  
 29.22 kg/m 2 1.91 m 2 Preferred Pharmacy Pharmacy Name Phone [facility][id number]- [id number] [phone] Your Updated Medication List  
  
   
This list is accurate as of 4/16/18 10:24 AM.  Always use your most recent med list.  
  
  
  
  
 aspirin 81 mg chewable tablet Take 1 Tab by mouth daily. atorvastatin 40 mg tablet Commonly known as:  LIPITOR  
TAKE 1 TABLET BY MOUTH EVERY DAY AT 5 PM  
  
 buPROPion  mg tablet Commonly known as:  Gerilyn Benders Take 1 Tab by mouth every morning. CALCIUM 600 WITH VITAMIN D3 600 mg(1,500mg) -400 unit Cap Generic drug:  Calcium-Cholecalciferol (D3) Take  by mouth. dilTIAZem  mg ER capsule Commonly known as:  CARDIZEM CD Take 1 Cap by mouth daily. escitalopram oxalate 10 mg tablet Commonly known as:  Fredick Herring Take 1 Tab by mouth daily. linaclotide 145 mcg Cap capsule Commonly known as:  Lorelee Spikes Take 1 Cap by mouth Daily (before breakfast). losartan 50 mg tablet Commonly known as:  COZAAR  
  
 metoprolol tartrate 50 mg tablet Commonly known as:  LOPRESSOR Take 1 Tab by mouth two (2) times a day. PLAVIX 75 mg Tab Generic drug:  clopidogrel Take 1 Tab by mouth daily. Prescriptions Sent to Pharmacy Refills  
 linaclotide (LINZESS) 145 mcg cap capsule 5 Sig: Take 1 Cap by mouth Daily (before breakfast). Class: Normal  
 Pharmacy: [facility]/pharmacy #[id number]- [id number], [address] [place] [address] Ph #: [phone] Route: Oral  
  
We Performed the Following CBC WITH AUTOMATED DIFF [02399 CPT(R)] LIPID PANEL [45337 CPT(R)] METABOLIC PANEL, COMPREHENSIVE [85466 CPT(R)] REFERRAL TO ORTHOPEDIC SURGERY [REF62 Custom] REFERRAL TO PODIATRY [REF90 Custom] TSH 3RD GENERATION [07897 CPT(R)] To-Do List   
 05/31/2018 Imaging:  [facility] MAMMO BI SCREENING INCL CAD Referral Information Referral ID Referred By Referred To  
  
 [id number] Carmela COPE [address] [facility] Visits Status Start Date End Date 1 New Request 4/16/18 4/16/19 If your referral has a status of pending review or denied, additional information will be sent to support the outcome of this decision. Referral ID Referred By Referred To  
 [id number] PRIS[place], [address], [address] [facility] [facility] Phone: [phone] Fax: [phone] Visits Status Start Date End Date 1 New Request 4/16/18 4/16/19 If your referral has a status of pending review or denied, additional information will be sent to support the outcome of this decision. Introducing [facility]! [employer] introduces [employer] patient portal. Now you can access parts of your medical record, email your doctor's office, and request medication refills online. 1. In your internet browser, go to [email or web address] [email or web address] 2. Click on the First Time User? Click Here link in the Sign In box. You will see the New Member Sign Up page. 3. Enter your [employer]aging[employer] Access Code exactly as it appears below. You will not need to use this code after youve completed the sign-up process. If you do not sign up before the expiration date, you must request a new code. · [employer]aging[employer] Access Code: [email or web address] Expires: 7/15/2018 10:23 AM 
 
4. Enter the last four digits of your Social Security Number (xxxx) and Date of Birth (mm/dd/yyyy) as indicated and click Submit. You will be taken to the next sign-up page. 5. Create a Nextnav ID. This will be your Nextnav login ID and cannot be changed, so think of one that is secure and easy to remember. 6. Create a Nextnav password. You can change your password at any time. 7. Enter your Password Reset Question and Answer. This can be used at a later time if you forget your password. 8. Enter your e-mail address. You will receive e-mail notification when new information is available in [address] 9. Click Sign Up. You can now view and download portions of your medical record. 10. Click the Download Summary menu link to download a portable copy of your medical information. If you have questions, please visit the Frequently Asked Questions section of the Nextnav website. Remember, Nextnav is NOT to be used for urgent needs. For medical emergencies, dial 911. Now available from your iPhone and Android! Please provide this summary of care documentation to your next provider. Your primary care clinician is listed as Rosibel Buenrostro. If you have any questions after today's visit, please call [phone].

## 2018-04-16 NOTE — PROGRESS NOTES
This is the Subsequent Medicare Annual Wellness Exam, performed 12 months or more after the Initial AWV or the last Subsequent AWV  I have reviewed the patient's medical history in detail and updated the computerized patient record. History     Past Medical History:   Diagnosis Date    Arthritis     Asthma     in 25s - no longer a problem    CAD (coronary artery disease)     NSTEMI with PCI (LETICIA)  to RCA 5/12/17     Depression     Dyslipidemia     Hypertension     Ill-defined condition     Hepatitis C    Knee injury     Liver disease     hep c--had treatment, states no longer present    Nausea & vomiting     Seizures (HCC)     Smoker       Past Surgical History:   Procedure Laterality Date    HX ORTHOPAEDIC      foot (right)    VASCULAR SURGERY PROCEDURE UNLIST      left leg varicose vein stripping     Current Outpatient Prescriptions   Medication Sig Dispense Refill    clopidogrel (PLAVIX) 75 mg tab Take 1 Tab by mouth daily.  linaclotide (LINZESS) 145 mcg cap capsule Take 1 Cap by mouth Daily (before breakfast). 30 Cap 5    losartan (COZAAR) 50 mg tablet       atorvastatin (LIPITOR) 40 mg tablet TAKE 1 TABLET BY MOUTH EVERY DAY AT 5 PM 30 Tab 3    dilTIAZem CD (CARDIZEM CD) 120 mg ER capsule Take 1 Cap by mouth daily. 90 Cap 3    metoprolol tartrate (LOPRESSOR) 50 mg tablet Take 1 Tab by mouth two (2) times a day. 180 Tab 3    escitalopram oxalate (LEXAPRO) 10 mg tablet Take 1 Tab by mouth daily. 90 Tab 3    Calcium-Cholecalciferol, D3, (CALCIUM 600 WITH VITAMIN D3) 600 mg(1,500mg) -400 unit cap Take  by mouth.  aspirin 81 mg chewable tablet Take 1 Tab by mouth daily. 30 Tab 0    buPROPion XL (WELLBUTRIN XL) 150 mg tablet Take 1 Tab by mouth every morning.  90 Tab 3     Allergies   Allergen Reactions    Pcn [Penicillins] Unknown (comments)     Family History   Problem Relation Age of Onset    Elevated Lipids Mother     Cancer Father      LUNG AND THROAT    Parkinson's Disease Father     Breast Cancer Paternal Aunt      Social History   Substance Use Topics    Smoking status: Current Every Day Smoker     Packs/day: 0.10    Smokeless tobacco: Never Used      Comment: smokes 3-4 cigarettes per day x 25 years    Alcohol use Yes      Comment: about 5 drinks per week     Patient Active Problem List   Diagnosis Code    Chronic hepatitis C ([facility] [address] B18.2    Osteopenia M85.80    Closed fracture of right patella S82.001A    HSV-2 (herpes simplex virus 2) infection B00.9    Abnormal EKG R94.31    Chest pain R07.9    Essential hypertension I10    Hypertension I10    Dyslipidemia E78.5       Depression Risk Factor Screening:     PHQ over the last two weeks 4/16/2018   PHQ Not Done Active Diagnosis of Depression or Bipolar Disorder   Little interest or pleasure in doing things -   Feeling down, depressed or hopeless -   Total Score PHQ 2 -   Trouble falling or staying asleep, or sleeping too much -   Feeling tired or having little energy -   Poor appetite or overeating -   Feeling bad about yourself - or that you are a failure or have let yourself or your family down -   Trouble concentrating on things such as school, work, reading or watching TV -   Moving or speaking so slowly that other people could have noticed; or the opposite being so fidgety that others notice -   Thoughts of being better off dead, or hurting yourself in some way -   PHQ 9 Score -   How difficult have these problems made it for you to do your work, take care of your home and get along with others -     Alcohol Risk Factor Screening: You do not drink alcohol or very rarely. Functional Ability and Level of Safety:   Hearing Loss  Hearing is good. Activities of Daily Living  The home contains: no safety equipment. Patient does total self care    Fall Risk  Fall Risk Assessment, last 12 mths 4/16/2018   Able to walk? Yes   Fall in past 12 months?  No   Fall with injury? -   Number of falls in past 12 months -   Fall Risk Score -       Abuse Screen  Patient is not abused    Cognitive Screening   Evaluation of Cognitive Function:  Has your family/caregiver stated any concerns about your memory: no  Normal    Patient Care Team   Patient Care Team:  Sindy Samaniego NP as PCP - General (Family Practice)  Farzana Jenkins LPN as Ambulatory Care Navigator  Pallavi Sanchez MD (Neurology)    Assessment/Plan   Education and counseling provided:  Are appropriate based on today's review and evaluation    Diagnoses and all orders for this visit:    1. Well adult exam  -     CBC WITH AUTOMATED DIFF  -     METABOLIC PANEL, COMPREHENSIVE  -     LIPID PANEL  -     TSH 3RD GENERATION    2. Dyslipidemia  -     LIPID PANEL    3. Essential hypertension  -     CBC WITH AUTOMATED DIFF  -     METABOLIC PANEL, COMPREHENSIVE    4. Coronary artery disease involving native heart without angina pectoris, unspecified vessel or lesion type  -     LIPID PANEL    5. Hammer toe of left foot  -     REFERRAL TO PODIATRY    6. Thumb pain, right  -     REFERRAL TO ORTHOPEDIC SURGERY    7. Screening mammogram, encounter for  -     [facility] MAMMO BI SCREENING INCL CAD; Future    8. Chronic constipation    Other orders  -     linaclotide (LINZESS) 145 mcg cap capsule; Take 1 Cap by mouth Daily (before breakfast). Advised this med may require PA - has failed miralax and colace      Health Maintenance Due   Topic Date Due    COLONOSCOPY  09/25/1968    Pneumococcal 65+ Low/Medium Risk (2 of 2 - PCV13) 11/10/2015    MEDICARE YEARLY EXAM  03/14/2018     I have discussed the diagnosis with the patient and the intended plan as seen in the above orders. The patient has received an after-visit summary and questions were answered concerning future plans. Patient conveyed understanding of the plan at the time of the visit.     Sindy Samaniego, MSN, ANP  4/16/2018

## 2018-04-16 NOTE — PROGRESS NOTES
Chief Complaint   Patient presents with    Labs     Pt is not fasting     Pt seen in the office today for labs   Pt is not fasting this am  Pt reports she stopped taking her \"depression\" meds, she does not know which one   She reports the medication made her eat to much, and she was not able to control the cravings to eat

## 2018-04-16 NOTE — PATIENT INSTRUCTIONS

## 2018-04-17 LAB
ALBUMIN SERPL-MCNC: 4.3 G/DL (ref 3.6–4.8)
ALBUMIN/GLOB SERPL: 1.4 {RATIO} (ref 1.2–2.2)
ALP SERPL-CCNC: 56 IU/L (ref 39–117)
ALT SERPL-CCNC: 16 IU/L (ref 0–32)
AST SERPL-CCNC: 26 IU/L (ref 0–40)
BASOPHILS # BLD AUTO: 0 X10E3/UL (ref 0–0.2)
BASOPHILS NFR BLD AUTO: 0 %
BILIRUB SERPL-MCNC: 0.3 MG/DL (ref 0–1.2)
BUN SERPL-MCNC: 30 MG/DL (ref 8–27)
BUN/CREAT SERPL: 23 (ref 12–28)
CALCIUM SERPL-MCNC: 9.8 MG/DL (ref 8.7–10.3)
CHLORIDE SERPL-SCNC: 102 MMOL/L (ref 96–106)
CHOLEST SERPL-MCNC: 138 MG/DL (ref 100–199)
CO2 SERPL-SCNC: 20 MMOL/L (ref 18–29)
CREAT SERPL-MCNC: 1.28 MG/DL (ref 0.57–1)
EOSINOPHIL # BLD AUTO: 0.7 X10E3/UL (ref 0–0.4)
EOSINOPHIL NFR BLD AUTO: 11 %
ERYTHROCYTE [DISTWIDTH] IN BLOOD BY AUTOMATED COUNT: 13.7 % (ref 12.3–15.4)
GFR SERPLBLD CREATININE-BSD FMLA CKD-EPI: 43 ML/MIN/1.73
GFR SERPLBLD CREATININE-BSD FMLA CKD-EPI: 50 ML/MIN/1.73
GLOBULIN SER CALC-MCNC: 3 G/DL (ref 1.5–4.5)
GLUCOSE SERPL-MCNC: 110 MG/DL (ref 65–99)
HCT VFR BLD AUTO: 33.5 % (ref 34–46.6)
HDLC SERPL-MCNC: 35 MG/DL
HGB BLD-MCNC: 10.9 G/DL (ref 11.1–15.9)
IMM GRANULOCYTES # BLD: 0 X10E3/UL (ref 0–0.1)
IMM GRANULOCYTES NFR BLD: 0 %
INTERPRETATION, 910389: NORMAL
INTERPRETATION: NORMAL
LDLC SERPL CALC-MCNC: 88 MG/DL (ref 0–99)
LYMPHOCYTES # BLD AUTO: 2.1 X10E3/UL (ref 0.7–3.1)
LYMPHOCYTES NFR BLD AUTO: 32 %
MCH RBC QN AUTO: 30.7 PG (ref 26.6–33)
MCHC RBC AUTO-ENTMCNC: 32.5 G/DL (ref 31.5–35.7)
MCV RBC AUTO: 94 FL (ref 79–97)
MONOCYTES # BLD AUTO: 0.6 X10E3/UL (ref 0.1–0.9)
MONOCYTES NFR BLD AUTO: 8 %
NEUTROPHILS # BLD AUTO: 3.2 X10E3/UL (ref 1.4–7)
NEUTROPHILS NFR BLD AUTO: 49 %
PDF IMAGE, 910387: NORMAL
PLATELET # BLD AUTO: 171 X10E3/UL (ref 150–379)
POTASSIUM SERPL-SCNC: 5.6 MMOL/L (ref 3.5–5.2)
PROT SERPL-MCNC: 7.3 G/DL (ref 6–8.5)
RBC # BLD AUTO: 3.55 X10E6/UL (ref 3.77–5.28)
SODIUM SERPL-SCNC: 141 MMOL/L (ref 134–144)
TRIGL SERPL-MCNC: 74 MG/DL (ref 0–149)
TSH SERPL DL<=0.005 MIU/L-ACNC: 1.35 UIU/ML (ref 0.45–4.5)
VLDLC SERPL CALC-MCNC: 15 MG/DL (ref 5–40)
WBC # BLD AUTO: 6.6 X10E3/UL (ref 3.4–10.8)

## 2018-04-21 NOTE — PROGRESS NOTES
Please find out if she has anyone following her liver functions, they are elevated again, it tends to be a problem that comes and goes. If not, we need to refer her to a Nephrologist for eval. Otherwise her labs look great for thyroid and sugar. Her iron is slightly lower, make sure she is taking a daily iron supplement like Feosol.  Ricke Skiff

## 2018-04-30 PROBLEM — R74.8 ELEVATED LIVER ENZYMES: Status: ACTIVE | Noted: 2018-04-30

## 2018-05-01 ENCOUNTER — OFFICE VISIT (OUTPATIENT)
Dept: HEMATOLOGY | Age: 68
End: 2018-05-01

## 2018-05-01 VITALS
SYSTOLIC BLOOD PRESSURE: 147 MMHG | WEIGHT: 175.2 LBS | DIASTOLIC BLOOD PRESSURE: 53 MMHG | BODY MASS INDEX: 29.19 KG/M2 | HEART RATE: 59 BPM | TEMPERATURE: 96.3 F | OXYGEN SATURATION: 98 % | HEIGHT: 65 IN

## 2018-05-01 DIAGNOSIS — B18.2 CHRONIC HEPATITIS C WITHOUT HEPATIC COMA (HCC): Primary | ICD-10-CM

## 2018-05-01 NOTE — MR AVS SNAPSHOT
2700 [facility] [phone] 1400 [address] [place] [address] 
[phone] Patient: Richard Flaherty MRN: [id number] ERA:[birth year] Visit Information Date & Time Provider Department Dept. Phone Encounter #  
 5/1/2018 12:45 PM Marielena Hylton, [address] [address] [id number] Follow-up Instructions Return if symptoms worsen or fail to improve. Upcoming Health Maintenance Date Due COLONOSCOPY 9/25/1967 Pneumococcal 65+ Low/Medium Risk (2 of 2 - PCV13) 11/10/2015 Influenza Age 5 to Adult 8/1/2018 BREAST CANCER SCRN MAMMOGRAM 3/20/2019 MEDICARE YEARLY EXAM 4/17/2019 GLAUCOMA SCREENING Q2Y 2/1/2020 DTaP/Tdap/Td series (2 - Td) 11/10/2024 Allergies as of 5/1/2018  Review Complete On: 5/1/2018 By: Pushap Herzog Severity Noted Reaction Type Reactions Pcn [Penicillins]  07/02/2010   Side Effect Unknown (comments) Current Immunizations  Reviewed on 12/1/2015 Name Date Influenza High Dose Vaccine PF 10/24/2017, 1/10/2017 Influenza Vaccine 11/17/2014 Influenza Vaccine (Quad) PF 12/1/2015 Pneumococcal Polysaccharide (PPSV-23) 11/10/2014 Tdap 11/10/2014 Not reviewed this visit You Were Diagnosed With   
  
 Codes Comments Chronic hepatitis C without hepatic coma (HCC)    -  Primary ICD-10-CM: B18.2 ICD-9-CM: 070.54 Vitals BP Pulse Temp Height(growth percentile) Weight(growth percentile) 147/53 (BP 1 Location: Left arm, BP Patient Position: Sitting) (!) 59 96.3 °F (35.7 °C) (Tympanic) 5' 5\" (1.651 m) 175 lb 3.2 oz (79.5 kg) SpO2 BMI OB Status Smoking Status 98% 29.15 kg/m2 Postmenopausal Current Every Day Smoker BMI and BSA Data Body Mass Index Body Surface Area  
 29.15 kg/m 2 1.91 m 2 Preferred Pharmacy Pharmacy Name Phone [id number]- [address] [phone] Your Updated Medication List  
  
   
This list is accurate as of 5/1/18  1:16 PM.  Always use your most recent med list.  
  
  
  
  
 aspirin 81 mg chewable tablet Take 1 Tab by mouth daily. atorvastatin 40 mg tablet Commonly known as:  LIPITOR  
TAKE 1 TABLET BY MOUTH EVERY DAY AT 5 PM  
  
 buPROPion  mg tablet Commonly known as:  Lieutenant Fogo Take 1 Tab by mouth every morning. CALCIUM 600 WITH VITAMIN D3 600 mg(1,500mg) -400 unit Cap Generic drug:  Calcium-Cholecalciferol (D3) Take  by mouth. dilTIAZem  mg ER capsule Commonly known as:  CARDIZEM CD Take 1 Cap by mouth daily. escitalopram oxalate 10 mg tablet Commonly known as:  Celesta Prost Take 1 Tab by mouth daily. linaclotide 145 mcg Cap capsule Commonly known as:  Coletta Ravel Take 1 Cap by mouth Daily (before breakfast). losartan 50 mg tablet Commonly known as:  COZAAR  
100 mg.  
  
 metoprolol tartrate 50 mg tablet Commonly known as:  LOPRESSOR Take 1 Tab by mouth two (2) times a day. PLAVIX 75 mg Tab Generic drug:  clopidogrel Take 1 Tab by mouth daily. Follow-up Instructions Return if symptoms worsen or fail to improve. To-Do List   
 05/21/2018 10:15 AM  
(Arrive by 10:00 AM) Appointment with [facility] 1 at [facility] ([phone]) Shower or bathe using soap and water. Do not use deodorant, powder, perfumes, or lotion the day of your exam.  If your prior mammograms were not performed at [facility] 6 please bring films with you or forward prior images 2 days before your procedure. Check in at registration 15min before your appointment time unless you were instructed to do otherwise. A script is not necessary, but if you have one, please bring it on the day of the mammogram or have it faxed to the department.   [facility] [phone] [facility]  [phone] [facility] [phone] [facility]  [phone] [facility] [phone] [facility] [phone] [facility] Please arrive 15 minutes prior to appointment to register Introducing [facility]! [employer] introduces [employer] patient portal. Now you can access parts of your medical record, email your doctor's office, and request medication refills online. 1. In your internet browser, go to [email or web address] 2. Click on the First Time User? Click Here link in the Sign In box. You will see the New Member Sign Up page. 3. Enter your [employer] Access Code exactly as it appears below. You will not need to use this code after youve completed the sign-up process. If you do not sign up before the expiration date, you must request a new code. · [employer] Access Code: [email or web address] Expires: 7/15/2018 10:23 AM 
 
4. Enter the last four digits of your Social Security Number (xxxx) and Date of Birth (mm/dd/yyyy) as indicated and click Submit. You will be taken to the next sign-up page. 5. Create a [employer] ID. This will be your [employer] login ID and cannot be changed, so think of one that is secure and easy to remember. 6. Create a [employer] password. You can change your password at any time. 7. Enter your Password Reset Question and Answer. This can be used at a later time if you forget your password. 8. Enter your e-mail address. You will receive e-mail notification when new information is available in [address] 9. Click Sign Up. You can now view and download portions of your medical record. 10. Click the Download Summary menu link to download a portable copy of your medical information. If you have questions, please visit the Frequently Asked Questions section of the [employer] website. Remember, [employer] is NOT to be used for urgent needs. For medical emergencies, dial 911. Now available from your iPhone and Android! Please provide this summary of care documentation to your next provider. Your primary care clinician is listed as Jody Garcia. If you have any questions after today's visit, please call [phone].

## 2018-05-03 LAB — HCV RNA SERPL QL NAA+PROBE: NEGATIVE

## 2018-05-03 NOTE — PROGRESS NOTES
Pt notified of continued normal liver enzymes and negative HCV RNA, no indication for long-term follow-up in this office unless PCP feels this is indicated.

## 2018-07-23 ENCOUNTER — HOSPITAL ENCOUNTER (OUTPATIENT)
Dept: MAMMOGRAPHY | Age: 68
Discharge: HOME | End: 2018-07-23
Attending: NURSE PRACTITIONER
Payer: MEDICARE

## 2018-07-23 DIAGNOSIS — Z12.31 SCREENING MAMMOGRAM, ENCOUNTER FOR: ICD-10-CM

## 2018-07-23 PROCEDURE — 77067 SCR MAMMO BI INCL CAD: CPT

## 2018-08-13 ENCOUNTER — HOSPITAL ENCOUNTER (OUTPATIENT)
Dept: MAMMOGRAPHY | Age: 68
Discharge: HOME | End: 2018-08-13
Attending: NURSE PRACTITIONER
Payer: MEDICARE

## 2018-08-13 DIAGNOSIS — R92.8 ABNORMAL MAMMOGRAM: ICD-10-CM

## 2018-08-13 PROCEDURE — 77065 DX MAMMO INCL CAD UNI: CPT

## 2018-09-18 ENCOUNTER — TELEPHONE (OUTPATIENT)
Dept: FAMILY MEDICINE CLINIC | Age: 68
End: 2018-09-18

## 2018-09-18 NOTE — TELEPHONE ENCOUNTER
Pt calling and states that she wants to have a colonoscopy done and needs to know who she can see and needs referral to see that specialist. Could you call her back with the name and information of who she can call. She can be reached at [phone].

## 2018-09-23 DIAGNOSIS — I10 ESSENTIAL HYPERTENSION: ICD-10-CM

## 2018-09-23 DIAGNOSIS — I25.10 CORONARY ARTERY DISEASE INVOLVING NATIVE HEART WITHOUT ANGINA PECTORIS, UNSPECIFIED VESSEL OR LESION TYPE: ICD-10-CM

## 2018-09-23 DIAGNOSIS — E78.5 DYSLIPIDEMIA: ICD-10-CM

## 2018-09-24 RX ORDER — METOPROLOL TARTRATE 50 MG/1
TABLET ORAL
Qty: 180 TAB | Refills: 2 | Status: SHIPPED | OUTPATIENT
Start: 2018-09-24 | End: 2019-06-20 | Stop reason: SDUPTHER

## 2018-09-24 RX ORDER — LOSARTAN POTASSIUM 100 MG/1
TABLET ORAL
Qty: 90 TAB | Refills: 2 | Status: SHIPPED | OUTPATIENT
Start: 2018-09-24 | End: 2019-01-31 | Stop reason: ALTCHOICE

## 2018-09-24 RX ORDER — DILTIAZEM HYDROCHLORIDE 120 MG/1
CAPSULE, COATED, EXTENDED RELEASE ORAL
Qty: 90 CAP | Refills: 2 | Status: SHIPPED | OUTPATIENT
Start: 2018-09-24 | End: 2019-06-20 | Stop reason: SDUPTHER

## 2019-01-11 ENCOUNTER — ANESTHESIA EVENT (OUTPATIENT)
Dept: ENDOSCOPY | Age: 69
End: 2019-01-11
Payer: MEDICARE

## 2019-01-12 NOTE — ANESTHESIA PREPROCEDURE EVALUATION
Anesthetic History PONV Review of Systems / Medical History Patient summary reviewed, nursing notes reviewed and pertinent labs reviewed Pulmonary Smoker Asthma (in her 25s) Neuro/Psych Within defined limits Cardiovascular CAD and cardiac stents (5/17) Exercise tolerance: >4 METS 
  
GI/Hepatic/Renal 
  
 
Hepatitis: type C Liver disease (hep c, has had treatment for it) Endo/Other Within defined limits Other Findings Physical Exam 
 
Airway Mallampati: II 
TM Distance: 4 - 6 cm Neck ROM: normal range of motion Mouth opening: Normal 
 
 Cardiovascular Rhythm: regular Rate: normal 
 
 
 
 Dental 
 
Dentition: Full upper dentures Pulmonary Breath sounds clear to auscultation Abdominal 
 
 
 
 Other Findings Anesthetic Plan ASA: 3 Anesthesia type: MAC Post-op pain plan if not by surgeon: peripheral nerve block continuous Induction: Intravenous Anesthetic plan and risks discussed with: Patient

## 2019-01-14 ENCOUNTER — HOSPITAL ENCOUNTER (OUTPATIENT)
Age: 69
Setting detail: OUTPATIENT SURGERY
Discharge: HOME | End: 2019-01-14
Attending: INTERNAL MEDICINE | Admitting: INTERNAL MEDICINE
Payer: MEDICARE

## 2019-01-14 ENCOUNTER — ANESTHESIA (OUTPATIENT)
Dept: ENDOSCOPY | Age: 69
End: 2019-01-14
Payer: MEDICARE

## 2019-01-14 VITALS
RESPIRATION RATE: 11 BRPM | OXYGEN SATURATION: 100 % | BODY MASS INDEX: 27.49 KG/M2 | HEIGHT: 65 IN | DIASTOLIC BLOOD PRESSURE: 56 MMHG | SYSTOLIC BLOOD PRESSURE: 167 MMHG | HEART RATE: 53 BPM | WEIGHT: 165 LBS | TEMPERATURE: 97.7 F

## 2019-01-14 LAB — COLONOSCOPY, EXTERNAL: NORMAL

## 2019-01-14 PROCEDURE — 76040000019: Performed by: INTERNAL MEDICINE

## 2019-01-14 PROCEDURE — 74011250636 HC RX REV CODE- 250/636: Performed by: INTERNAL MEDICINE

## 2019-01-14 PROCEDURE — 74011250636 HC RX REV CODE- 250/636

## 2019-01-14 PROCEDURE — 76060000031 HC ANESTHESIA FIRST 0.5 HR: Performed by: INTERNAL MEDICINE

## 2019-01-14 RX ORDER — DEXTROMETHORPHAN/PSEUDOEPHED 2.5-7.5/.8
1.2 DROPS ORAL
Status: DISCONTINUED | OUTPATIENT
Start: 2019-01-14 | End: 2019-01-14 | Stop reason: HOSPADM

## 2019-01-14 RX ORDER — MIDAZOLAM HYDROCHLORIDE 1 MG/ML
.25-5 INJECTION, SOLUTION INTRAMUSCULAR; INTRAVENOUS
Status: DISCONTINUED | OUTPATIENT
Start: 2019-01-14 | End: 2019-01-14 | Stop reason: HOSPADM

## 2019-01-14 RX ORDER — EPINEPHRINE 0.1 MG/ML
1 INJECTION INTRACARDIAC; INTRAVENOUS
Status: DISCONTINUED | OUTPATIENT
Start: 2019-01-14 | End: 2019-01-14 | Stop reason: HOSPADM

## 2019-01-14 RX ORDER — PROPOFOL 10 MG/ML
INJECTION, EMULSION INTRAVENOUS
Status: DISCONTINUED | OUTPATIENT
Start: 2019-01-14 | End: 2019-01-14 | Stop reason: HOSPADM

## 2019-01-14 RX ORDER — NALOXONE HYDROCHLORIDE 0.4 MG/ML
0.4 INJECTION, SOLUTION INTRAMUSCULAR; INTRAVENOUS; SUBCUTANEOUS
Status: DISCONTINUED | OUTPATIENT
Start: 2019-01-14 | End: 2019-01-14 | Stop reason: HOSPADM

## 2019-01-14 RX ORDER — SODIUM CHLORIDE 9 MG/ML
50 INJECTION, SOLUTION INTRAVENOUS CONTINUOUS
Status: DISCONTINUED | OUTPATIENT
Start: 2019-01-14 | End: 2019-01-14 | Stop reason: HOSPADM

## 2019-01-14 RX ORDER — PROPOFOL 10 MG/ML
INJECTION, EMULSION INTRAVENOUS AS NEEDED
Status: DISCONTINUED | OUTPATIENT
Start: 2019-01-14 | End: 2019-01-14 | Stop reason: HOSPADM

## 2019-01-14 RX ORDER — SODIUM CHLORIDE 9 MG/ML
INJECTION, SOLUTION INTRAVENOUS
Status: DISCONTINUED | OUTPATIENT
Start: 2019-01-14 | End: 2019-01-14 | Stop reason: HOSPADM

## 2019-01-14 RX ORDER — ATROPINE SULFATE 0.1 MG/ML
0.4 INJECTION INTRAVENOUS
Status: DISCONTINUED | OUTPATIENT
Start: 2019-01-14 | End: 2019-01-14 | Stop reason: HOSPADM

## 2019-01-14 RX ORDER — FLUMAZENIL 0.1 MG/ML
0.2 INJECTION INTRAVENOUS
Status: DISCONTINUED | OUTPATIENT
Start: 2019-01-14 | End: 2019-01-14 | Stop reason: HOSPADM

## 2019-01-14 RX ADMIN — PROPOFOL 80 MG: 10 INJECTION, EMULSION INTRAVENOUS at 13:43

## 2019-01-14 RX ADMIN — SODIUM CHLORIDE 50 ML/HR: 900 INJECTION, SOLUTION INTRAVENOUS at 13:37

## 2019-01-14 RX ADMIN — PROPOFOL 100 MCG/KG/MIN: 10 INJECTION, EMULSION INTRAVENOUS at 13:43

## 2019-01-14 RX ADMIN — SODIUM CHLORIDE: 9 INJECTION, SOLUTION INTRAVENOUS at 13:25

## 2019-01-14 NOTE — ROUTINE PROCESS
Carlitos Smallwood 1950 
[id number] Situation: 
Verbal report received from: Gifty Cardona Procedure: Procedure(s): 
COLONOSCOPY Background: 
 
Preoperative diagnosis: CONSTIPATION Postoperative diagnosis: Diverticulosis :  Dr. Brittany Thornton Assistant(s): Endoscopy Technician-1: Georgia Munoz Endoscopy RN-1: Tiffany Overton RN Specimens: * No specimens in log * H. Pylori  no Assessment: 
Intra-procedure medications Anesthesia gave intra-procedure sedation and medications, see anesthesia flow sheet yes Intravenous fluids: NS@ Anay Slater Vital signs stable Abdominal assessment: round and soft Recommendation: 
Discharge patient per MD order. Family or Friend Permission to share finding with family or friend yes

## 2019-01-14 NOTE — ANESTHESIA POSTPROCEDURE EVALUATION
Procedure(s): 
COLONOSCOPY. Anesthesia Post Evaluation Patient location during evaluation: PACU Level of consciousness: awake Pain management: adequate Airway patency: patent Anesthetic complications: no 
Cardiovascular status: acceptable Respiratory status: acceptable Hydration status: acceptable Visit Vitals /49 Pulse (!) 57 Temp 36.5 °C (97.7 °F) Resp 11 Ht 5' 5\" (1.651 m) Wt 74.8 kg (165 lb) SpO2 99% Breastfeeding? No  
BMI 27.46 kg/m²

## 2019-01-14 NOTE — H&P
The patient is a 70year old female who presents with a complaint of Constipation. Reason for encounter: consultation at the request of Dr. Steffanie Hardy). The onset of the constipation has been chronic and has been occurring in a persistent pattern for years. The course has been constant. Note for \"Constipation\": She takes Linzess, but it gives her diarrhea and she does not take it every day. She denies any blood in the stools or black stools. She reports she has lost about 2 to 3 lbs over the last few days. She denies any upper GI symptoms of heartburn or acid reflux. She denies abdominal pain. She had a colonoscopy about 6 years ago. Was due for repeat last year but could not do it as she was on a blood thinner for coronary stent placement. She had hepatitis C and got treated by Dr. Lynn Scott. Problem List/Past Medical (Osiel Mccurdy; 2018 10:27 AM) Hypercholesterolemia   
Hypertension   
 
Past Surgical History (Osiel Mccurdy; 2018 10:27 AM) S/P coronary artery stent placement (V45.82  Z95.5)   
REPAIR RIGHT PATELLA, PERCUTANEOUS APPROACH (23778)   
SURGICAL REMOVAL OF VARICOSE VEIN OF LEFT LOWER EXTREMITY (71658)   Allergies (Osiel Mccurdy; 2018 10:27 AM) Penicillins   
 
Medication History (Osiel Mccurdy; 2018 10:34 AM) Losartan Potassium  (1 Oral daily) Specific strength unknown - Active. Metoprolol Tartrate  (1 Oral bid) Specific strength unknown - Active. Escitalopram Oxalate  (1 Oral daily) Specific strength unknown - Active. DilTIAZem CD  (1 Oral daily) Specific strength unknown - Active. Atorvastatin Calcium  (1 Oral daily) Specific strength unknown - Active. Vitamin B12  (1 Oral daily) Specific strength unknown - Active. Medications Reconciled  
 
Family History (Osiel Naziacoby; 2018 10:27 AM) Lung Cancer   Father. Throat cancer (149.0  C14.0)   First Degree Relatives. Breast Cancer   First Degree Relatives. Leukemia   Son.  Hypercholesterolemia   Mother, Brother. Social History (Raleigh Runner; 11/26/2018 10:27 AM) Blood Transfusion   No. 
Alcohol Use   Occasional alcohol use. Employment status   Part-time. Marital status   Single. Tobacco Use   Current every day smoker, Would like to quit. Diagnostic Studies History (Raleigh Runner; 11/26/2018 10:27 AM) Colonoscopy  [2013]: Health Maintenance History (Raleigh Runner; 11/26/2018 10:27 AM) Flu Vaccine  [2017]: Pneumovax   pt not sure when Review of Systems (Raleigh Runner; 11/26/2018 10:27 AM) General Not Present- Chronic Fatigue, Poor Appetite, Weight Gain and Weight Loss. Skin Not Present- Itching, Rash and Skin Color Changes. HEENT Not Present- Hearing Loss and Vertigo. Respiratory Not Present- Difficulty Breathing and TB exposure. Cardiovascular Present- Hypertension. Not Present- Chest Pain, Use of Antibiotics before Dental Procedures and Use of Blood Thinners. Gastrointestinal Present- See HPI. Musculoskeletal Present- Arthritis. Not Present- Hip Replacement Surgery and Knee Replacement Surgery. Neurological Not Present- Weakness. Psychiatric Not Present- Depression. Endocrine Not Present- Diabetes and Thyroid Problems. Hematology Not Present- Anemia. Vitals (Raleigh Runner; 11/26/2018 10:30 AM) 
11/26/2018 10:27 AM 
Weight: 171 lb   Height: 65 in  
Body Surface Area: 1.85 m²   Body Mass Index: 28.46 kg/m²   
Temp.: 97.8° F (Temporal)    Pulse: 62 (Regular)    Resp. : 16 (Unlabored)    
BP: 152/80 (Sitting, Left Arm, Standard) Physical Exam (David Hector MD; 11/26/2018 11:03 AM) General 
Mental Status - Alert. General Appearance - Cooperative, Pleasant, Not in acute distress. Orientation - Oriented X3. Build & Nutrition - Well nourished and Well developed. Integumentary General Characteristics Overall examination of the patient's skin reveals - no rashes, no bruises and no spider angiomas. Color - normal coloration of skin. Head and Neck Neck Global Assessment - full range of motion and supple, no bruit auscultated on the right, no bruit auscultated on the left, non-tender, no lymphadenopathy. Thyroid Gland Characteristics - normal size and consistency. Eye Eyeball - Left - No Exophthalmos. Eyeball - Right - No Exophthalmos. Sclera/Conjunctiva - Left - No Jaundice. Sclera/Conjunctiva - Right - No Jaundice. Chest and Lung Exam 
Chest and lung exam reveals  - quiet, even and easy respiratory effort with no use of accessory muscles. Auscultation Breath sounds - Normal. Adventitious sounds - No Adventitious sounds. Cardiovascular Auscultation Rhythm - Regular, No Tachycardia, No Bradycardia . Heart Sounds - Normal heart sounds , S1 WNL and S2 WNL, No S3, No Summation Gallop. Murmurs & Other Heart Sounds - Auscultation of the heart reveals - No Murmurs. Abdomen Inspection Inspection of the abdomen reveals - Non-distended. Palpation/Percussion Tenderness - Non-Tender. Rebound tenderness - No rebound. Liver - No hepatosplenomegaly. Abdominal Mass Palpable - No masses. Other Characteristics - No Ascites. Organomegally - None. Auscultation Auscultation of the abdomen reveals - Bowel sounds normal, No Abdominal bruits and No Succussion splash. Rectal - Did not examine. Peripheral Vascular Upper Extremity Inspection - Left - Normal - No Clubbing, No Cyanosis, No Edema, Pulses Intact. Right - Normal - No Clubbing, No Cyanosis, No Edema, Pulses Intact. Palpation - Edema - Left - No edema. Right - No edema. Lower Extremity Inspection - Left - Inspection Normal. Right - Inspection Normal. Palpation - Edema - Left - No edema. Right - No edema. Neurologic Neurologic evaluation reveals  - Cranial nerves grossly intact, no focal neurologic deficits. Motor Involuntary Movements - Asterixis - not present. Musculoskeletal 
Global Assessment Gait and Station - normal gait and station. Assessment & Plan (David Guillaume MD; 11/26/2018 11:08 AM) Constipation (Preliminary Diagnosis) (564.00  K59.00) Impression: Will try Linzess 72 mcg, samples given to her. Dietary and lifestyle modifications were discussed. If still with symptoms, samples of Trulance given to her as well. Colon cancer screening (V76.51  Z12.11) Current Plans Discussed the risks and benefits of colonoscopy with the patient. COLONOSCOPY, DIAGNOSTIC (62966) (Discussed risks and benefits with the patient to include:; perforation, post polypectomy, or post biopsy bleeding, missed lesions, and sedation reactions.) Started Suprep Bowel Prep Kit 17.5-3.13-1.6GM/180ML, 180 Milliliter Take as directed before Colonoscopy, 180 Milliliter, 1 day starting 11/26/2018, No Refill. Pt Education - How to access health information online: discussed with patient and provided information. Patient is to call me for any questions or concerns.

## 2019-01-14 NOTE — PERIOP NOTES
Procedure being performed under MAC; Christine Frank CRNA at bedside monitoring patient at [phone]. See anesthesia notes. Endoscope was pre-cleaned at bedside immediately following procedure by Shanta Fernandez at 1358. Care of patient assumed from the anesthesia provider at 1401. Patient tolerated procedure well. Abdomen remains soft and non tender post procedure, no complaints or indication of discomfort noted at this time. See anesthesia note. Patient transferred to Endoscopy Recovery and report given to recovery nurse Martínez Ureña recovery room [id number].

## 2019-01-14 NOTE — DISCHARGE INSTRUCTIONS
[facility]– [place][note 2]0 [facility]. Winsome Gannon M.D.  [phone]            COLON DISCHARGE INSTRUCTIONS       2019    Steph Jackson  :  1950  [facility] Medical Record Number:  [id number]      COLONOSCOPY FINDINGS:  Your colonoscopy showed mild diverticulosis and small internal hemorrhoids, otherwise looked within normal.    DISCOMFORT:  Redness at IV site- apply warm compress to area; if redness or soreness persist- contact your physician  There may be a slight amount of blood passed from the rectum  Gaseous discomfort- walking, belching will help relieve any discomfort  You may not operate a vehicle for 12 hours  You may not engage in an occupation involving machinery or appliances for rest of today  You may not drink alcoholic beverages for at least 12 hours  Avoid making any critical decisions for at least 24 hour  DIET:   High fiber diet. - however -  remember your colon is empty and a heavy meal will produce gas. Avoid these foods:  vegetables, fried / greasy foods, carbonated drinks for today     ACTIVITY:  You may resume your normal daily activities it is recommended that you spend the remainder of the day resting -  avoid any strenuous activity. CALL M.D. ANY SIGN OF:   Increasing pain, nausea, vomiting  Abdominal distension (swelling)  New increased bleeding (oral or rectal)  Fever (chills)  Pain in chest area  Bloody discharge from nose or mouth   Shortness of breath    Follow-up Instructions:   Call Dr. Ludivina Torres if any questions or problems. Telephone # [phone]  Continue with daily bowel regimen to avoid constipation  Should have a repeat colonoscopy in 10 years.

## 2019-01-30 ENCOUNTER — TELEPHONE (OUTPATIENT)
Dept: FAMILY MEDICINE CLINIC | Age: 69
End: 2019-01-30

## 2019-01-30 NOTE — TELEPHONE ENCOUNTER
Please advise pt that her losartan was recalled, has her pharmacy contacted her? If not please advise that we will send in an alternate medication. Please advise if pt agrees.

## 2019-01-31 RX ORDER — OLMESARTAN MEDOXOMIL 40 MG/1
40 TABLET ORAL DAILY
Qty: 90 TAB | Refills: 3 | Status: SHIPPED | OUTPATIENT
Start: 2019-01-31 | End: 2020-02-24 | Stop reason: SDUPTHER

## 2019-01-31 NOTE — TELEPHONE ENCOUNTER
Called and spoke with patient. She is in agreement with having the losartan changed. She would like the new medication to be sent in to pill pack pharmacy on file.

## 2019-03-07 ENCOUNTER — OFFICE VISIT (OUTPATIENT)
Dept: FAMILY MEDICINE CLINIC | Age: 69
End: 2019-03-07

## 2019-03-07 VITALS
HEART RATE: 66 BPM | HEIGHT: 65 IN | DIASTOLIC BLOOD PRESSURE: 76 MMHG | OXYGEN SATURATION: 99 % | SYSTOLIC BLOOD PRESSURE: 170 MMHG | BODY MASS INDEX: 27.16 KG/M2 | RESPIRATION RATE: 16 BRPM | WEIGHT: 163 LBS | TEMPERATURE: 98 F

## 2019-03-07 DIAGNOSIS — J06.9 UPPER RESPIRATORY TRACT INFECTION, UNSPECIFIED TYPE: Primary | ICD-10-CM

## 2019-03-07 RX ORDER — BENZONATATE 100 MG/1
100 CAPSULE ORAL
Qty: 60 CAP | Refills: 1 | Status: SHIPPED | OUTPATIENT
Start: 2019-03-07 | End: 2019-03-14

## 2019-03-07 RX ORDER — AZITHROMYCIN 250 MG/1
TABLET, FILM COATED ORAL
Qty: 6 TAB | Refills: 0 | Status: SHIPPED | OUTPATIENT
Start: 2019-03-07 | End: 2019-06-25 | Stop reason: ALTCHOICE

## 2019-03-07 RX ORDER — LORATADINE 10 MG/1
10 TABLET ORAL DAILY
Qty: 30 TAB | Refills: 11 | Status: SHIPPED | OUTPATIENT
Start: 2019-03-07 | End: 2019-06-25 | Stop reason: ALTCHOICE

## 2019-03-07 NOTE — PROGRESS NOTES
Chief Complaint   Patient presents with    Cough     X 1 week congestion    Wheezing     worse at night    Hypertension     1. Have you been to the ER, urgent care clinic since your last visit? Hospitalized since your last visit? No    2. Have you seen or consulted any other health care providers outside of the [address] [place] [address] since your last visit? Include any pap smears or colon screening. No      Chief Complaint   Patient presents with    Cough     X 1 week congestion    Wheezing     worse at night    Hypertension     She is a 70 y.o. female who presents for evalution. Reviewed PmHx, RxHx, FmHx, SocHx, AllgHx and updated and dated in the chart. Patient Active Problem List    Diagnosis    Elevated liver enzymes    Abnormal EKG    Chest pain    Essential hypertension    Hypertension    Dyslipidemia    HSV-2 (herpes simplex virus 2) infection    Closed fracture of right patella    Osteopenia    Chronic hepatitis C (HCC)       Review of Systems - negative except as listed above in the HPI    Objective:     Vitals:    03/07/19 1432   BP: 170/76   Pulse: 66   Resp: 16   Temp: 98 °F (36.7 °C)   TempSrc: Oral   SpO2: 99%   Weight: 163 lb (73.9 kg)   Height: 5' 5\" (1.651 m)     Physical Examination: General appearance - alert, well appearing, and in no distress  Ears - bilateral TM's and external ear canals normal  Nose - normal and patent, no erythema, discharge or polyps  Mouth - mucous membranes moist, pharynx normal without lesions  Neck - supple, no significant adenopathy  Chest - clear to auscultation, no wheezes, rales or rhonchi, symmetric air entry    Assessment/ Plan:   Diagnoses and all orders for this visit:    1. Upper respiratory tract infection, unspecified type  -     azithromycin (ZITHROMAX) 250 mg tablet; Take two tablets today then one tablet daily  -     benzonatate (TESSALON PERLES) 100 mg capsule;  Take 1 Cap by mouth three (3) times daily as needed for Cough for up to 7 days. -     loratadine (CLARITIN) 10 mg tablet; Take 1 Tab by mouth daily for 360 days. Follow-up Disposition:  Return if symptoms worsen or fail to improve. I have discussed the diagnosis with the patient and the intended plan as seen in the above orders. The patient understands and agrees with the plan. The patient has received an after-visit summary and questions were answered concerning future plans. Medication Side Effects and Warnings were discussed with patient  Patient Labs were reviewed and or requested:  Patient Past Records were reviewed and or requested    Melisa Monzon M.D. There are no Patient Instructions on file for this visit.

## 2019-03-15 ENCOUNTER — TELEPHONE (OUTPATIENT)
Dept: FAMILY MEDICINE CLINIC | Age: 69
End: 2019-03-15

## 2019-03-15 DIAGNOSIS — M79.672 LEFT FOOT PAIN: Primary | ICD-10-CM

## 2019-03-15 NOTE — TELEPHONE ENCOUNTER
Benny Villanueva with [facility] called in and stated that the patient needs a referral faxed to them for the date of service of 01/08/2019.   Phone [phone] ext [phone]  Fax# [phone]

## 2019-03-18 NOTE — TELEPHONE ENCOUNTER
Called office back, spoke with candy she stated that there is no one there at this time to help and she will get them to give a call back.  Left name and office number and verbalized understanding

## 2019-03-21 NOTE — TELEPHONE ENCOUNTER
Spoke with nic pt saw Dr. Lew Crystal reason for visit was (code #:Z79.177) pain in her left foot fax number is :[phone]

## 2019-03-22 NOTE — TELEPHONE ENCOUNTER
Faxed referral form to podiatry (Dr. Madelaine Petersen) @ [phone]. Confirmation number [id number]. Original form placed in scan folder for central scanning.

## 2019-04-02 RX ORDER — LEVOCETIRIZINE DIHYDROCHLORIDE 5 MG/1
5 TABLET, FILM COATED ORAL DAILY
Qty: 30 TAB | Refills: 11 | Status: SHIPPED | OUTPATIENT
Start: 2019-04-02 | End: 2020-08-03

## 2019-04-02 RX ORDER — VARENICLINE TARTRATE 25 MG
KIT ORAL
Qty: 1 DOSE PACK | Refills: 0 | Status: SHIPPED | OUTPATIENT
Start: 2019-04-02 | End: 2019-08-20 | Stop reason: SDUPTHER

## 2019-04-23 ENCOUNTER — DOCUMENTATION ONLY (OUTPATIENT)
Dept: FAMILY MEDICINE CLINIC | Age: 69
End: 2019-04-23

## 2019-05-24 ENCOUNTER — OFFICE VISIT (OUTPATIENT)
Dept: FAMILY MEDICINE CLINIC | Age: 69
End: 2019-05-24

## 2019-05-24 VITALS
RESPIRATION RATE: 12 BRPM | TEMPERATURE: 97.8 F | DIASTOLIC BLOOD PRESSURE: 70 MMHG | HEIGHT: 65 IN | OXYGEN SATURATION: 99 % | WEIGHT: 159 LBS | BODY MASS INDEX: 26.49 KG/M2 | SYSTOLIC BLOOD PRESSURE: 160 MMHG | HEART RATE: 57 BPM

## 2019-05-24 DIAGNOSIS — R30.0 DYSURIA: Primary | ICD-10-CM

## 2019-05-24 LAB
BILIRUB UR QL STRIP: NEGATIVE
GLUCOSE UR-MCNC: NEGATIVE MG/DL
KETONES P FAST UR STRIP-MCNC: NEGATIVE MG/DL
PH UR STRIP: 6.5 [PH] (ref 4.6–8)
PROT UR QL STRIP: NEGATIVE
SP GR UR STRIP: 1.01 (ref 1–1.03)
UA UROBILINOGEN AMB POC: NORMAL (ref 0.2–1)
URINALYSIS CLARITY POC: CLEAR
URINALYSIS COLOR POC: YELLOW
URINE BLOOD POC: NORMAL
URINE LEUKOCYTES POC: NORMAL
URINE NITRITES POC: NEGATIVE

## 2019-05-24 RX ORDER — CIPROFLOXACIN 500 MG/1
500 TABLET ORAL 2 TIMES DAILY
Qty: 10 TAB | Refills: 0 | Status: SHIPPED | OUTPATIENT
Start: 2019-05-24 | End: 2019-05-29

## 2019-05-24 NOTE — PROGRESS NOTES
Chief Complaint   Patient presents with    UTI     Pt in office today for possible uti  -pt states she has pressure when she urinates  -pt states there is little burning    Pt has no other concerns

## 2019-05-24 NOTE — PROGRESS NOTES
Tiffanie Azul is a 70 y.o. female who complains of dysuria, frequency, urgency for 4 days. Patient denies flank pain, vomiting, fever, unusual vaginal discharge. Patient does not have a history of recurrent UTI. Patient does not have a history of pyelonephritis. Review of Systems  Pertinent items are noted in HPI. Objective:     Visit Vitals  /70 (BP 1 Location: Right arm, BP Patient Position: Sitting)   Pulse (!) 57   Temp 97.8 °F (36.6 °C) (Oral)   Resp 12   Ht 5' 5\" (1.651 m)   Wt 159 lb (72.1 kg)   SpO2 99%   BMI 26.46 kg/m²     General:  alert, cooperative, no distress   Abdomen: soft, nontender, nondistended, no masses or organomegaly. Back:  CVA tenderness absent   :  defer exam     Laboratory:   Urine dipstick shows positive for leukocytes. Micro exam: not done. Assessment/Plan:     Acute cystitis     1. ciprofloxacin  2. Maintain adequate hydration  3. May use OTC pyridium as desired, which will turn urine orange/red color  4. Follow up if symptoms not improving, and prn. Encounter Diagnoses   Name Primary?  Dysuria Yes     Orders Placed This Encounter    AMB POC URINALYSIS DIP STICK AUTO W/O MICRO    ciprofloxacin HCl (CIPRO) 500 mg tablet   . I have discussed the diagnosis with the patient and the intended plan as seen in the above orders. The patient has received an after-visit summary and questions were answered concerning future plans. Patient conveyed understanding of the plan at the time of the visit.     Claudia Szymanski, MSN, ANP  5/24/2019

## 2019-06-20 DIAGNOSIS — I10 ESSENTIAL HYPERTENSION: ICD-10-CM

## 2019-06-20 DIAGNOSIS — I25.10 CORONARY ARTERY DISEASE INVOLVING NATIVE HEART WITHOUT ANGINA PECTORIS, UNSPECIFIED VESSEL OR LESION TYPE: ICD-10-CM

## 2019-06-20 DIAGNOSIS — E78.5 DYSLIPIDEMIA: ICD-10-CM

## 2019-06-23 RX ORDER — METOPROLOL TARTRATE 50 MG/1
TABLET ORAL
Qty: 60 TAB | Refills: 1 | Status: SHIPPED | OUTPATIENT
Start: 2019-06-23 | End: 2019-08-19 | Stop reason: SDUPTHER

## 2019-06-23 RX ORDER — DILTIAZEM HYDROCHLORIDE 120 MG/1
CAPSULE, COATED, EXTENDED RELEASE ORAL
Qty: 90 CAP | Refills: 1 | Status: SHIPPED | OUTPATIENT
Start: 2019-06-23 | End: 2020-01-14 | Stop reason: SDUPTHER

## 2019-06-25 ENCOUNTER — OFFICE VISIT (OUTPATIENT)
Dept: FAMILY MEDICINE CLINIC | Age: 69
End: 2019-06-25

## 2019-06-25 VITALS
OXYGEN SATURATION: 98 % | SYSTOLIC BLOOD PRESSURE: 156 MMHG | BODY MASS INDEX: 26.46 KG/M2 | RESPIRATION RATE: 18 BRPM | WEIGHT: 158.8 LBS | HEART RATE: 66 BPM | DIASTOLIC BLOOD PRESSURE: 68 MMHG | TEMPERATURE: 98.1 F | HEIGHT: 65 IN

## 2019-06-25 DIAGNOSIS — E78.5 DYSLIPIDEMIA: ICD-10-CM

## 2019-06-25 DIAGNOSIS — I10 ESSENTIAL HYPERTENSION: ICD-10-CM

## 2019-06-25 DIAGNOSIS — Z00.00 WELL ADULT EXAM: Primary | ICD-10-CM

## 2019-06-25 DIAGNOSIS — F32.A DEPRESSION, UNSPECIFIED DEPRESSION TYPE: ICD-10-CM

## 2019-06-25 DIAGNOSIS — H91.90 HEARING LOSS, UNSPECIFIED HEARING LOSS TYPE, UNSPECIFIED LATERALITY: ICD-10-CM

## 2019-06-25 RX ORDER — ESCITALOPRAM OXALATE 10 MG/1
10 TABLET ORAL DAILY
Qty: 90 TAB | Refills: 3 | Status: SHIPPED | OUTPATIENT
Start: 2019-06-25 | End: 2019-12-09 | Stop reason: SDUPTHER

## 2019-06-25 NOTE — PATIENT INSTRUCTIONS
Medicare Wellness Visit, Female The best way to live healthy is to have a lifestyle where you eat a well-balanced diet, exercise regularly, limit alcohol use, and quit all forms of tobacco/nicotine, if applicable. Regular preventive services are another way to keep healthy. Preventive services (vaccines, screening tests, monitoring & exams) can help personalize your care plan, which helps you manage your own care. Screening tests can find health problems at the earliest stages, when they are easiest to treat. [facility] follows the current, evidence-based guidelines published by the [facility] Fernando Berna ([facility]) when recommending preventive services for our patients. Because we follow these guidelines, sometimes recommendations change over time as research supports it. (For example, mammograms used to be recommended annually. Even though Medicare will still pay for an annual mammogram, the newer guidelines recommend a mammogram every two years for women of average risk.) Of course, you and your doctor may decide to screen more often for some diseases, based on your risk and your health status. Preventive services for you include: - Medicare offers their members a free annual wellness visit, which is time for you and your primary care provider to discuss and plan for your preventive service needs. Take advantage of this benefit every year! 
-All adults over the age of 72 should receive the recommended pneumonia vaccines. Current USPSTF guidelines recommend a series of two vaccines for the best pneumonia protection.  
-All adults should have a flu vaccine yearly and a tetanus vaccine every 10 years. All adults age 61 and older should receive a shingles vaccine once in their lifetime.   
-A bone mass density test is recommended when a woman turns 65 to screen for osteoporosis. This test is only recommended one time, as a screening. Some providers will use this same test as a disease monitoring tool if you already have osteoporosis. -All adults age 38-68 who are overweight should have a diabetes screening test once every three years.  
-Other screening tests and preventive services for persons with diabetes include: an eye exam to screen for diabetic retinopathy, a kidney function test, a foot exam, and stricter control over your cholesterol.  
-Cardiovascular screening for adults with routine risk involves an electrocardiogram (ECG) at intervals determined by your doctor.  
-Colorectal cancer screenings should be done for adults age 54-65 with no increased risk factors for colorectal cancer. There are a number of acceptable methods of screening for this type of cancer. Each test has its own benefits and drawbacks. Discuss with your doctor what is most appropriate for you during your annual wellness visit. The different tests include: colonoscopy (considered the best screening method), a fecal occult blood test, a fecal DNA test, and sigmoidoscopy. -Breast cancer screenings are recommended every other year for women of normal risk, age 54-69. 
-Cervical cancer screenings for women over age 72 are only recommended with certain risk factors.  
-All adults born between [facility] should be screened once for Hepatitis C. Here is a list of your current Health Maintenance items (your personalized list of preventive services) with a due date: 
Health Maintenance Due Topic Date Due  Shingles Vaccine (1 of 2) 09/25/1999  Pneumococcal Vaccine (2 of 2 - PCV13) 11/10/2015 [facility] Annual Well Visit  04/17/2019

## 2019-06-25 NOTE — PROGRESS NOTES
Martin Villanueva is a 70 y.o. female    Chief Complaint   Patient presents with    Complete Physical     w/o PAP    Medication Evaluation     Wellbutrin     1. Have you been to the ER, urgent care clinic since your last visit? Hospitalized since your last visit? No    2. Have you seen or consulted any other health care providers outside of the [address] [place] [address] since your last visit? Include any pap smears or colon screening.  No    Visit Vitals  /68 (BP 1 Location: Left arm, BP Patient Position: Sitting)   Pulse 66   Temp 98.1 °F (36.7 °C) (Oral)   Resp 18   Ht 5' 5\" (1.651 m)   Wt 158 lb 12.8 oz (72 kg)   SpO2 98%   BMI 26.43 kg/m²

## 2019-06-25 NOTE — PROGRESS NOTES
This is the Subsequent Medicare Annual Wellness Exam, performed 12 months or more after the Initial AWV or the last Subsequent AWV  I have reviewed the patient's medical history in detail and updated the computerized patient record. History     Past Medical History:   Diagnosis Date    Arthritis     Asthma     in 25s - no longer a problem    CAD (coronary artery disease)     NSTEMI with PCI (LETICIA)  to RCA 5/12/17     Depression     Dyslipidemia     H/O heart artery stent     x1    Hypertension     Ill-defined condition     Hepatitis C    Knee injury     Liver disease     hep c--had treatment, states no longer present    Nausea & vomiting     Seizures (HCC)     Smoker       Past Surgical History:   Procedure Laterality Date    COLONOSCOPY N/A 1/14/2019    COLONOSCOPY performed by Chase Reyes MD at [address] [facility] HX COLONOSCOPY  2013    HX CORONARY STENT PLACEMENT      x1    HX ORTHOPAEDIC Bilateral     foot surgery    VASCULAR SURGERY PROCEDURE UNLIST      left leg varicose vein stripping     Current Outpatient Medications   Medication Sig Dispense Refill    escitalopram oxalate (LEXAPRO) 10 mg tablet Take 1 Tab by mouth daily. 90 Tab 3    dilTIAZem CD (CARDIZEM CD) 120 mg ER capsule Take 1 Cap by mouth daily. 90 Cap 1    metoprolol tartrate (LOPRESSOR) 50 mg tablet Take 1 Tab by mouth two (2) times a day. 60 Tab 1    varenicline (CHANTIX STARTER RADHA) 0.5 mg (11)- 1 mg (42) DsPk As directed on package 1 Dose Pack 0    levocetirizine (XYZAL) 5 mg tablet Take 1 Tab by mouth daily. 30 Tab 11    olmesartan (BENICAR) 40 mg tablet Take 1 Tab by mouth daily. 90 Tab 3    clopidogrel (PLAVIX) 75 mg tab Take 1 Tab by mouth daily.  linaclotide (LINZESS) 145 mcg cap capsule Take 1 Cap by mouth Daily (before breakfast).  30 Cap 5    atorvastatin (LIPITOR) 40 mg tablet TAKE 1 TABLET BY MOUTH EVERY DAY AT 5 PM 30 Tab 3    Calcium-Cholecalciferol, D3, (CALCIUM 600 WITH VITAMIN D3) 600 mg(1,500mg) -400 unit cap Take  by mouth.  aspirin 81 mg chewable tablet Take 1 Tab by mouth daily.  30 Tab 0     Allergies   Allergen Reactions    Pcn [Penicillins] Unknown (comments)     Family History   Problem Relation Age of Onset    Elevated Lipids Mother     Cancer Father         LUNG AND THROAT    Parkinson's Disease Father     Breast Cancer Paternal Aunt      Social History     Tobacco Use    Smoking status: Current Every Day Smoker     Packs/day: 0.10    Smokeless tobacco: Never Used    Tobacco comment: smokes 3-4 cigarettes per day x 25 years   Substance Use Topics    Alcohol use: Yes     Comment: about 5 drinks per week     Patient Active Problem List   Diagnosis Code    Chronic hepatitis C (HCC) B18.2    Osteopenia M85.80    Closed fracture of right patella S82.001A    HSV-2 (herpes simplex virus 2) infection B00.9    Abnormal EKG R94.31    Chest pain R07.9    Essential hypertension I10    Hypertension I10    Dyslipidemia E78.5    Elevated liver enzymes R74.8       Depression Risk Factor Screening:     3 most recent PHQ Screens 6/25/2019   PHQ Not Done -   Little interest or pleasure in doing things Several days   Feeling down, depressed, irritable, or hopeless Several days   Total Score PHQ 2 2   Trouble falling or staying asleep, or sleeping too much -   Feeling tired or having little energy -   Poor appetite, weight loss, or overeating -   Feeling bad about yourself - or that you are a failure or have let yourself or your family down -   Trouble concentrating on things such as school, work, reading, or watching TV -   Moving or speaking so slowly that other people could have noticed; or the opposite being so fidgety that others notice -   Thoughts of being better off dead, or hurting yourself in some way -   PHQ 9 Score -   How difficult have these problems made it for you to do your work, take care of your home and get along with others -     Alcohol Risk Factor Screening: You do drink alcohol daily, one drink    Functional Ability and Level of Safety:   Hearing Loss  Hearing needs eval; tv is very loud at home    Activities of Daily Living  The home contains: no safety equipment. Patient does total self care    Fall Risk  Fall Risk Assessment, last 12 mths 6/25/2019   Able to walk? Yes   Fall in past 12 months? -   Fall with injury? -   Number of falls in past 12 months -   Fall Risk Score -       Abuse Screen  Patient is not abused    Cognitive Screening   Evaluation of Cognitive Function:  Has your family/caregiver stated any concerns about your memory: no  Normal    Patient Care Team   Patient Care Team:  Javid Ramey NP as PCP - General (Family Practice)  Gilles Sauer LPN as Ambulatory Care Navigator  Rigoberto Germain MD (Neurology)    Assessment/Plan   Education and counseling provided:  Are appropriate based on today's review and evaluation    Diagnoses and all orders for this visit:    1. Well adult exam  -     LIPID PANEL  -     METABOLIC PANEL, COMPREHENSIVE  -     CBC WITH AUTOMATED DIFF  -     TSH 3RD GENERATION    2. Essential hypertension  -     METABOLIC PANEL, COMPREHENSIVE  -     CBC WITH AUTOMATED DIFF    3. Dyslipidemia  -     LIPID PANEL    4. Depression, unspecified depression type    5. Hearing loss, unspecified hearing loss type, unspecified laterality  -     REFERRAL TO ENT-OTOLARYNGOLOGY    Other orders  -     escitalopram oxalate (LEXAPRO) 10 mg tablet; Take 1 Tab by mouth daily. I have discussed the diagnosis with the patient and the intended plan as seen in the above orders. The patient has received an after-visit summary and questions were answered concerning future plans. Patient conveyed understanding of the plan at the time of the visit.     Kathy Reeder, MSN, ANP  6/25/2019

## 2019-06-26 LAB
ALBUMIN SERPL-MCNC: 4.3 G/DL (ref 3.6–4.8)
ALBUMIN/GLOB SERPL: 1.3 {RATIO} (ref 1.2–2.2)
ALP SERPL-CCNC: 63 IU/L (ref 39–117)
ALT SERPL-CCNC: 17 IU/L (ref 0–32)
AST SERPL-CCNC: 28 IU/L (ref 0–40)
BASOPHILS # BLD AUTO: 0 X10E3/UL (ref 0–0.2)
BASOPHILS NFR BLD AUTO: 0 %
BILIRUB SERPL-MCNC: 0.5 MG/DL (ref 0–1.2)
BUN SERPL-MCNC: 21 MG/DL (ref 8–27)
BUN/CREAT SERPL: 19 (ref 12–28)
CALCIUM SERPL-MCNC: 9.5 MG/DL (ref 8.7–10.3)
CHLORIDE SERPL-SCNC: 106 MMOL/L (ref 96–106)
CHOLEST SERPL-MCNC: 153 MG/DL (ref 100–199)
CO2 SERPL-SCNC: 22 MMOL/L (ref 20–29)
CREAT SERPL-MCNC: 1.1 MG/DL (ref 0.57–1)
EOSINOPHIL # BLD AUTO: 0.7 X10E3/UL (ref 0–0.4)
EOSINOPHIL NFR BLD AUTO: 11 %
ERYTHROCYTE [DISTWIDTH] IN BLOOD BY AUTOMATED COUNT: 14.8 % (ref 12.3–15.4)
GLOBULIN SER CALC-MCNC: 3.4 G/DL (ref 1.5–4.5)
GLUCOSE SERPL-MCNC: 106 MG/DL (ref 65–99)
HCT VFR BLD AUTO: 36.2 % (ref 34–46.6)
HDLC SERPL-MCNC: 48 MG/DL
HGB BLD-MCNC: 11.8 G/DL (ref 11.1–15.9)
IMM GRANULOCYTES # BLD AUTO: 0 X10E3/UL (ref 0–0.1)
IMM GRANULOCYTES NFR BLD AUTO: 0 %
INTERPRETATION, 910389: NORMAL
INTERPRETATION: NORMAL
LDLC SERPL CALC-MCNC: 89 MG/DL (ref 0–99)
LYMPHOCYTES # BLD AUTO: 1.7 X10E3/UL (ref 0.7–3.1)
LYMPHOCYTES NFR BLD AUTO: 28 %
MCH RBC QN AUTO: 30.3 PG (ref 26.6–33)
MCHC RBC AUTO-ENTMCNC: 32.6 G/DL (ref 31.5–35.7)
MCV RBC AUTO: 93 FL (ref 79–97)
MONOCYTES # BLD AUTO: 0.4 X10E3/UL (ref 0.1–0.9)
MONOCYTES NFR BLD AUTO: 7 %
NEUTROPHILS # BLD AUTO: 3.3 X10E3/UL (ref 1.4–7)
NEUTROPHILS NFR BLD AUTO: 54 %
PDF IMAGE, 910387: NORMAL
PLATELET # BLD AUTO: 164 X10E3/UL (ref 150–450)
POTASSIUM SERPL-SCNC: 5.5 MMOL/L (ref 3.5–5.2)
PROT SERPL-MCNC: 7.7 G/DL (ref 6–8.5)
RBC # BLD AUTO: 3.89 X10E6/UL (ref 3.77–5.28)
SODIUM SERPL-SCNC: 142 MMOL/L (ref 134–144)
TRIGL SERPL-MCNC: 81 MG/DL (ref 0–149)
TSH SERPL DL<=0.005 MIU/L-ACNC: 0.51 UIU/ML (ref 0.45–4.5)
VLDLC SERPL CALC-MCNC: 16 MG/DL (ref 5–40)
WBC # BLD AUTO: 6.1 X10E3/UL (ref 3.4–10.8)

## 2019-06-28 NOTE — PROGRESS NOTES
RECOMMENDATIONS:  None. Keep up the good work! All of your labs are greatly improved from last visit including kidney functions, blood count, and cholesterol! Work on diet and exercise. Recheck this test: 6 months.

## 2019-08-19 DIAGNOSIS — I10 ESSENTIAL HYPERTENSION: ICD-10-CM

## 2019-08-19 DIAGNOSIS — I25.10 CORONARY ARTERY DISEASE INVOLVING NATIVE HEART WITHOUT ANGINA PECTORIS, UNSPECIFIED VESSEL OR LESION TYPE: ICD-10-CM

## 2019-08-19 DIAGNOSIS — E78.5 DYSLIPIDEMIA: ICD-10-CM

## 2019-08-19 RX ORDER — METOPROLOL TARTRATE 50 MG/1
TABLET ORAL
Qty: 60 TAB | Refills: 2 | Status: SHIPPED | OUTPATIENT
Start: 2019-08-19 | End: 2019-11-15 | Stop reason: SDUPTHER

## 2019-08-21 RX ORDER — VARENICLINE TARTRATE 25 MG
KIT ORAL
Qty: 53 DOSE PACK | Refills: 0 | Status: SHIPPED | OUTPATIENT
Start: 2019-08-21 | End: 2020-01-20

## 2019-11-12 RX ORDER — LINACLOTIDE 145 UG/1
CAPSULE, GELATIN COATED ORAL
Qty: 30 CAP | Refills: 5 | Status: SHIPPED | OUTPATIENT
Start: 2019-11-12 | End: 2021-01-11

## 2019-11-15 DIAGNOSIS — I25.10 CORONARY ARTERY DISEASE INVOLVING NATIVE HEART WITHOUT ANGINA PECTORIS, UNSPECIFIED VESSEL OR LESION TYPE: ICD-10-CM

## 2019-11-15 DIAGNOSIS — I10 ESSENTIAL HYPERTENSION: ICD-10-CM

## 2019-11-15 DIAGNOSIS — E78.5 DYSLIPIDEMIA: ICD-10-CM

## 2019-11-15 RX ORDER — METOPROLOL TARTRATE 50 MG/1
TABLET ORAL
Qty: 60 TAB | Refills: 1 | Status: SHIPPED | OUTPATIENT
Start: 2019-11-15 | End: 2020-02-24 | Stop reason: SDUPTHER

## 2019-12-03 ENCOUNTER — HOSPITAL ENCOUNTER (OUTPATIENT)
Dept: MAMMOGRAPHY | Age: 69
Discharge: HOME | End: 2019-12-03
Attending: NURSE PRACTITIONER
Payer: MEDICARE

## 2019-12-03 DIAGNOSIS — Z12.39 BREAST SCREENING: ICD-10-CM

## 2019-12-03 PROCEDURE — 77067 SCR MAMMO BI INCL CAD: CPT

## 2019-12-09 RX ORDER — ESCITALOPRAM OXALATE 10 MG/1
10 TABLET ORAL DAILY
Qty: 90 TAB | Refills: 3 | Status: SHIPPED | OUTPATIENT
Start: 2019-12-09 | End: 2020-10-05

## 2020-01-14 ENCOUNTER — TELEPHONE (OUTPATIENT)
Dept: FAMILY MEDICINE CLINIC | Age: 70
End: 2020-01-14

## 2020-01-14 RX ORDER — DILTIAZEM HYDROCHLORIDE 120 MG/1
CAPSULE, COATED, EXTENDED RELEASE ORAL
Qty: 90 CAP | Refills: 1 | Status: SHIPPED | OUTPATIENT
Start: 2020-01-14 | End: 2020-08-07

## 2020-01-14 NOTE — TELEPHONE ENCOUNTER
----- Message from Jose Alfredo Cristina sent at 1/14/2020 11:03 AM EST -----  Regarding: : Shama Rivas/ Refill  Contact: [phone]  Caller (if not patient): pt  Relationship of caller (if not patient): self  Best contact number(s):  [phone]  Name of medication and dosage if known: Diltiazem 120 mg  Is patient out of this medication (yes/no): yes  Ph[facility] name[facility] listed in chart? (yes/no): yes  Pharmacy phone number: [phone]  Date of last visit: June 25, 2019  Details to clarify the request: Pt is out of listed medication. Pt would like refill sent to pharmacy on file.

## 2020-01-20 ENCOUNTER — OFFICE VISIT (OUTPATIENT)
Dept: FAMILY MEDICINE CLINIC | Age: 70
End: 2020-01-20

## 2020-01-20 VITALS
TEMPERATURE: 98 F | BODY MASS INDEX: 27.99 KG/M2 | OXYGEN SATURATION: 97 % | WEIGHT: 168 LBS | HEIGHT: 65 IN | RESPIRATION RATE: 18 BRPM | DIASTOLIC BLOOD PRESSURE: 80 MMHG | HEART RATE: 75 BPM | SYSTOLIC BLOOD PRESSURE: 122 MMHG

## 2020-01-20 DIAGNOSIS — M79.671 BILATERAL FOOT PAIN: ICD-10-CM

## 2020-01-20 DIAGNOSIS — M79.672 BILATERAL FOOT PAIN: ICD-10-CM

## 2020-01-20 DIAGNOSIS — M54.42 ACUTE BILATERAL LOW BACK PAIN WITH LEFT-SIDED SCIATICA: Primary | ICD-10-CM

## 2020-01-20 DIAGNOSIS — L08.9 LACERATION OF FINGER WITH INFECTION, SUBSEQUENT ENCOUNTER: ICD-10-CM

## 2020-01-20 DIAGNOSIS — S61.219D LACERATION OF FINGER WITH INFECTION, SUBSEQUENT ENCOUNTER: ICD-10-CM

## 2020-01-20 PROBLEM — I73.9 PERIPHERAL VASCULAR DISEASE (HCC): Status: ACTIVE | Noted: 2020-01-20

## 2020-01-20 RX ORDER — METHOCARBAMOL 750 MG/1
750 TABLET, FILM COATED ORAL
Qty: 30 TAB | Refills: 0 | Status: SHIPPED | OUTPATIENT
Start: 2020-01-20 | End: 2020-08-03

## 2020-01-20 RX ORDER — CEPHALEXIN 500 MG/1
500 CAPSULE ORAL 2 TIMES DAILY
Qty: 10 CAP | Refills: 0 | Status: SHIPPED | OUTPATIENT
Start: 2020-01-20 | End: 2020-01-25

## 2020-01-20 RX ORDER — PREDNISONE 10 MG/1
TABLET ORAL
Qty: 21 TAB | Refills: 0 | Status: SHIPPED | OUTPATIENT
Start: 2020-01-20 | End: 2020-08-03

## 2020-01-20 NOTE — PATIENT INSTRUCTIONS
Sciatica: Care Instructions Your Care Instructions Sciatica (say \"dsh-QK-gs-kuh\") is an irritation of one of the sciatic nerves, which come from the spinal cord in the lower back. The sciatic nerves and their branches extend down through the buttock to the foot. Sciatica can develop when an injured disc in the back presses against a spinal nerve root. Its main symptom is pain, numbness, or weakness that is often worse in the leg or foot than in the back. Sciatica often will improve and go away with time. Early treatment usually includes medicines and exercises to relieve pain. Follow-up care is a key part of your treatment and safety. Be sure to make and go to all appointments, and call your doctor if you are having problems. It's also a good idea to know your test results and keep a list of the medicines you take. How can you care for yourself at home? · Take pain medicines exactly as directed. ? If the doctor gave you a prescription medicine for pain, take it as prescribed. ? If you are not taking a prescription pain medicine, ask your doctor if you can take an over-the-counter medicine. · Use heat or ice to relieve pain. ? To apply heat, put a warm water bottle, heating pad set on low, or warm cloth on your back. Do not go to sleep with a heating pad on your skin. ? To use ice, put ice or a cold pack on the area for 10 to 20 minutes at a time. Put a thin cloth between the ice and your skin. · Avoid sitting if possible, unless it feels better than standing. · Alternate lying down with short walks. Increase your walking distance as you are able to without making your symptoms worse. · Do not do anything that makes your symptoms worse. When should you call for help? Call 911 anytime you think you may need emergency care. For example, call if: 
  · You are unable to move a leg at all.  
[facility] your doctor now or seek immediate medical care if:   · You have new or worse symptoms in your legs or buttocks. Symptoms may include: 
? Numbness or tingling. ? Weakness. ? Pain.  
  · You lose bladder or bowel control.  
 Watch closely for changes in your health, and be sure to contact your doctor if: 
  · You are not getting better as expected. Where can you learn more? Go to http://rupert-arnold.info/. Enter [phone] in the search box to learn more about \"Sciatica: Care Instructions. \" Current as of: June 26, 2019 Content Version: 12.2 © 0802-5365 [employer]. Care instructions adapted under license by [employer] (which disclaims liability or warranty for this information). If you have questions about a medical condition or this instruction, always ask your healthcare professional. [address] any warranty or liability for your use of this information. Sciatica: Exercises Introduction Here are some examples of typical rehabilitation exercises for your condition. Start each exercise slowly. Ease off the exercise if you start to have pain. Your doctor or physical therapist will tell you when you can start these exercises and which ones will work best for you. When you are not being active, find a comfortable position for rest. Some people are comfortable on the floor or a medium-firm bed with a small pillow under their head and another under their knees. Some people prefer to lie on their side with a pillow between their knees. Don't stay in one position for too long. Take short walks (10 to 20 minutes) every 2 to 3 hours. Avoid slopes, hills, and stairs until you feel better. Walk only distances you can manage without pain, especially leg pain. How to do the exercises Back stretches 1. Get down on your hands and knees on the floor. 2. Relax your head and allow it to droop.  Round your back up toward the ceiling until you feel a nice stretch in your upper, middle, and lower back. Hold this stretch for as long as it feels comfortable, or about 15 to 30 seconds. 3. Return to the starting position with a flat back while you are on your hands and knees. 4. Let your back sway by pressing your stomach toward the floor. Lift your buttocks toward the ceiling. 5. Hold this position for 15 to 30 seconds. 6. Repeat 2 to 4 times. Follow-up care is a key part of your treatment and safety. Be sure to make and go to all appointments, and call your doctor if you are having problems. It's also a good idea to know your test results and keep a list of the medicines you take. Where can you learn more? Go to http://rupert-arnold.info/. Enter X165 in the search box to learn more about \"Sciatica: Exercises. \" Current as of: June 26, 2019 Content Version: 12.2 © 5766-7618 [employer]. Care instructions adapted under license by [employer] (which disclaims liability or warranty for this information). If you have questions about a medical condition or this instruction, always ask your healthcare professional. [address] any warranty or liability for your use of this information.

## 2020-01-20 NOTE — PROGRESS NOTES
Chief Complaint   Patient presents with    Leg Pain     left leg    Post-Op Problem     Patient presents during walk in clinic with left leg pain that began 2 weeks ago. Pt states pain started in groin and have now radiates down to back of left leg. Pt has also noted lower back pain. Pt states at the onset of pain was walking her dog. Noticed bilateral groin pain that progressively worsened and she was moving around. Denies any history of back problems. Pt denies recent injury, but did note fell backwards down stairs on Jostin day, no pain but deep bruising on back. Have been treating with Aleve,with no relief noted. Denies any tingling or numbness in the toes. Feels a constant pain in the back and left leg. Worse with certain movements, like making the bed. Unable to turn to get comfortable. Sharp, excruciating pain. Pt needs stitches removed from right pointer finger. Stitches was placed last Monday on 1/13. Pt would like a referral for podiatrist. Pt has been told she needs surgery on her feet. Chief Complaint   Patient presents with    Leg Pain     left leg    Post-Op Problem     she is a 78y.o. year old female who presents for evalution. Reviewed PmHx, RxHx, FmHx, SocHx, AllgHx and updated and dated in the chart.     Review of Systems - negative except as listed above in the HPI    Objective:     Vitals:    01/20/20 0754   BP: 122/80   Pulse: 75   Resp: 18   Temp: 98 °F (36.7 °C)   TempSrc: Oral   SpO2: 97%   Weight: 168 lb (76.2 kg)   Height: 5' 5\" (1.651 m)     Physical Examination: General appearance - alert, well appearing, and in no distress  Mental status - normal mood, behavior, speech, dress, motor activity, and thought processes  Chest - clear to auscultation, no wheezes, rales or rhonchi, symmetric air entry  Heart - normal rate, regular rhythm, normal S1, S2, no murmurs  Back exam - antalgic gait, pain with motion noted during exam, tenderness noted along bilateral paraspinals with L>R, positive straight-leg raise left ipsilateral leg, normal reflexes and strength bilateral lower extremities  Extremities - peripheral pulses normal, no pedal edema, no clubbing or cyanosis  Skin - right medial proximal index finger with healing laceration where there are 6 sutures present, surrounding erythema and swelling noted, slightly tender, 2 of the sutures have ripped away from the skin causing slight wound dehisence    Assessment/ Plan:   Diagnoses and all orders for this visit:    1. Acute bilateral low back pain with left-sided sciatica  -     predniSONE (STERAPRED DS) 10 mg dose pack; See administration instruction per 10mg dose pack  -     methocarbamol (ROBAXIN) 750 mg tablet; Take 1 Tab by mouth three (3) times daily as needed for Pain. Start med regimen to calm down inflammation. Reinforced SEs/ADRs. Enc to alternate heat and ice. Rest for 24 hours then start stretching and exercising to strengthen the low back muscles and prevent further injury. Massage painful area to relieve muscle tension. OTC NSAID for pain/inflammation. Work on good body mechanics, avoid heavy lifting. RTC if sx persist or worsen. 2. Laceration of finger with infection, subsequent encounter  -     cephALEXin (KEFLEX) 500 mg capsule; Take 1 Cap by mouth two (2) times a day for 5 days. Complete as directed. Reviewed SEs/ADRs of medication. RTC Friday for suture removal. Continue to apply abx ointment to finger. 3. Bilateral foot pain  -     REFERRAL TO PODIATRY  Referral to Dr. Jayden Julian for further evaluation. Follow-up and Dispositions    · Return in about 4 days (around 1/24/2020) for suture removal.         I have discussed the diagnosis with the patient and the intended plan as seen in the above orders. The patient has received an after-visit summary and questions were answered concerning future plans.      Medication Side Effects and Warnings were discussed with patient: yes  Patient Labs were reviewed and or requested: yes  Patient Past Records were reviewed and or requested  yes  Patient / Caregiver Understanding of treatment plan was verbalized during office visit NATHALIE Walsh    Patient Instructions          Sciatica: Care Instructions  Your Care Instructions    Sciatica (say \"jti-CD-mu-kuh\") is an irritation of one of the sciatic nerves, which come from the spinal cord in the lower back. The sciatic nerves and their branches extend down through the buttock to the foot. Sciatica can develop when an injured disc in the back presses against a spinal nerve root. Its main symptom is pain, numbness, or weakness that is often worse in the leg or foot than in the back. Sciatica often will improve and go away with time. Early treatment usually includes medicines and exercises to relieve pain. Follow-up care is a key part of your treatment and safety. Be sure to make and go to all appointments, and call your doctor if you are having problems. It's also a good idea to know your test results and keep a list of the medicines you take. How can you care for yourself at home? · Take pain medicines exactly as directed. ? If the doctor gave you a prescription medicine for pain, take it as prescribed. ? If you are not taking a prescription pain medicine, ask your doctor if you can take an over-the-counter medicine. · Use heat or ice to relieve pain. ? To apply heat, put a warm water bottle, heating pad set on low, or warm cloth on your back. Do not go to sleep with a heating pad on your skin. ? To use ice, put ice or a cold pack on the area for 10 to 20 minutes at a time. Put a thin cloth between the ice and your skin. · Avoid sitting if possible, unless it feels better than standing. · Alternate lying down with short walks. Increase your walking distance as you are able to without making your symptoms worse. · Do not do anything that makes your symptoms worse.   When should you call for help? Call 911 anytime you think you may need emergency care. For example, call if:    · You are unable to move a leg at all.   [facility] Crimes your doctor now or seek immediate medical care if:    · You have new or worse symptoms in your legs or buttocks. Symptoms may include:  ? Numbness or tingling. ? Weakness. ? Pain.     · You lose bladder or bowel control.    Watch closely for changes in your health, and be sure to contact your doctor if:    · You are not getting better as expected. Where can you learn more? Go to http://rupertMobiveilarnold.info/. Enter [phone] in the search box to learn more about \"Sciatica: Care Instructions. \"  Current as of: June 26, 2019  Content Version: 12.2  © 9217-9750 [employer] ([employer]). Care instructions adapted under license by [employer] (which disclaims liability or warranty for this information). If you have questions about a medical condition or this instruction, always ask your healthcare professional. [place] [address] any warranty or liability for your use of this information. Sciatica: Exercises  Introduction  Here are some examples of typical rehabilitation exercises for your condition. Start each exercise slowly. Ease off the exercise if you start to have pain. Your doctor or physical therapist will tell you when you can start these exercises and which ones will work best for you. When you are not being active, find a comfortable position for rest. Some people are comfortable on the floor or a medium-firm bed with a small pillow under their head and another under their knees. Some people prefer to lie on their side with a pillow between their knees. Don't stay in one position for too long. Take short walks (10 to 20 minutes) every 2 to 3 hours. Avoid slopes, hills, and stairs until you feel better. Walk only distances you can manage without pain, especially leg pain. How to do the exercises  Back stretches    1.  Get down on your hands and knees on the floor. 2. Relax your head and allow it to droop. Round your back up toward the ceiling until you feel a nice stretch in your upper, middle, and lower back. Hold this stretch for as long as it feels comfortable, or about 15 to 30 seconds. 3. Return to the starting position with a flat back while you are on your hands and knees. 4. Let your back sway by pressing your stomach toward the floor. Lift your buttocks toward the ceiling. 5. Hold this position for 15 to 30 seconds. 6. Repeat 2 to 4 times. Follow-up care is a key part of your treatment and safety. Be sure to make and go to all appointments, and call your doctor if you are having problems. It's also a good idea to know your test results and keep a list of the medicines you take. Where can you learn more? Go to http://rupert-arnold.info/. Enter I666 in the search box to learn more about \"Sciatica: Exercises. \"  Current as of: June 26, 2019  Content Version: 12.2  © 4053-0785 [employer]. Care instructions adapted under license by [employer] (which disclaims liability or warranty for this information). If you have questions about a medical condition or this instruction, always ask your healthcare professional. [address] any warranty or liability for your use of this information.

## 2020-01-20 NOTE — PROGRESS NOTES
Chief Complaint   Patient presents with    Leg Pain     left leg    Post-Op Problem     Patient presents during walk in clinic with left leg pain that began last week. Pt states pain started in groin and have now radiates down to back of left leg. Pt has also noted lower back pain. Pt states at the onset of pain was walking down. Pt denies recent injury, but did note fell backwards down stairs on Jostin day,no pain but deep bruising on back. Have been treating with Aleve,with no relief noted. Pt needs stitches removed from right pointer finger. Stitches was placed last Monday on 1/13. Pt would like a referral for podiatrist.      1. Have you been to the ER, urgent care clinic since your last visit? Hospitalized since your last visit? No    2. Have you seen or consulted any other health care providers outside of the [address] [place] [address] since your last visit? Include any pap smears or colon screening.  No

## 2020-01-24 ENCOUNTER — OFFICE VISIT (OUTPATIENT)
Dept: FAMILY MEDICINE CLINIC | Age: 70
End: 2020-01-24

## 2020-01-24 VITALS
SYSTOLIC BLOOD PRESSURE: 158 MMHG | HEART RATE: 63 BPM | WEIGHT: 168 LBS | HEIGHT: 65 IN | TEMPERATURE: 98.2 F | BODY MASS INDEX: 27.99 KG/M2 | OXYGEN SATURATION: 99 % | DIASTOLIC BLOOD PRESSURE: 78 MMHG | RESPIRATION RATE: 18 BRPM

## 2020-01-24 DIAGNOSIS — S61.219D LACERATION OF FINGER WITH INFECTION, SUBSEQUENT ENCOUNTER: ICD-10-CM

## 2020-01-24 DIAGNOSIS — L08.9 LACERATION OF FINGER WITH INFECTION, SUBSEQUENT ENCOUNTER: ICD-10-CM

## 2020-01-24 DIAGNOSIS — I10 ESSENTIAL HYPERTENSION: ICD-10-CM

## 2020-01-24 DIAGNOSIS — M54.42 ACUTE BILATERAL LOW BACK PAIN WITH LEFT-SIDED SCIATICA: Primary | ICD-10-CM

## 2020-01-24 NOTE — PROGRESS NOTES
Chief Complaint   Patient presents with    Surgical Follow-up     Patient in office today for f/u and post op. Pt had stitches placed on 1/13. Pt is currently taking keflex. Pt reports improvement in low back and leg pain. Has been taking medication as prescribed. Took her BP medication just prior to appt. Chief Complaint   Patient presents with    Surgical Follow-up     she is a 78y.o. year old female who presents for evalution. Reviewed PmHx, RxHx, FmHx, SocHx, AllgHx and updated and dated in the chart. Review of Systems - negative except as listed above in the HPI    Objective:     Vitals:    01/24/20 0727 01/24/20 0743   BP: 175/84 158/78   Pulse: 63    Resp: 18    Temp: 98.2 °F (36.8 °C)    TempSrc: Oral    SpO2: 99%    Weight: 168 lb (76.2 kg)    Height: 5' 5\" (1.651 m)      Physical Examination: General appearance - alert, well appearing, and in no distress  Chest - clear to auscultation, no wheezes, rales or rhonchi, symmetric air entry  Heart - normal rate, regular rhythm, normal S1, S2, no murmurs  Skin - 5 sutures removed from right index finger without any complication    Assessment/ Plan:   Diagnoses and all orders for this visit:    1. Acute bilateral low back pain with left-sided sciatica  Complete medication regimen as prescribed. 2. Laceration of finger with infection, subsequent encounter  -     SUTURE / STAPLE REMOVAL BY PROVIDER  Tolerated without complication. Recommended pt apply mederma and keep covered. Will likely have a bad scar from 2 sutures popping shortly after being placed. 3. Essential hypertension  Continue to monitor closely. BP looked great on Monday. Follow-up and Dispositions    · Return if symptoms worsen or fail to improve. I have discussed the diagnosis with the patient and the intended plan as seen in the above orders. The patient has received an after-visit summary and questions were answered concerning future plans.      Medication Side Effects and Warnings were discussed with patient: yes  Patient Labs were reviewed and or requested: no  Patient Past Records were reviewed and or requested  yes  Patient / Caregiver Understanding of treatment plan was verbalized during office visit YES    NATHALIE Dunn    There are no Patient Instructions on file for this visit.

## 2020-01-24 NOTE — PROGRESS NOTES
Chief Complaint   Patient presents with    Surgical Follow-up     Patient in office today for f/u and post op. Pt had stitches placed on 1/13. Pt is currently taking keflex. 1. Have you been to the ER, urgent care clinic since your last visit? Hospitalized since your last visit? No    2. Have you seen or consulted any other health care providers outside of the [address] [place] [address] since your last visit? Include any pap smears or colon screening.  No

## 2020-02-03 DIAGNOSIS — M54.42 ACUTE BILATERAL LOW BACK PAIN WITH LEFT-SIDED SCIATICA: Primary | ICD-10-CM

## 2020-02-03 RX ORDER — GABAPENTIN 100 MG/1
100 CAPSULE ORAL 2 TIMES DAILY
Qty: 60 CAP | Refills: 1 | Status: SHIPPED | OUTPATIENT
Start: 2020-02-03 | End: 2020-10-05

## 2020-02-24 ENCOUNTER — TELEPHONE (OUTPATIENT)
Dept: FAMILY MEDICINE CLINIC | Age: 70
End: 2020-02-24

## 2020-02-24 DIAGNOSIS — I25.10 CORONARY ARTERY DISEASE INVOLVING NATIVE HEART WITHOUT ANGINA PECTORIS, UNSPECIFIED VESSEL OR LESION TYPE: ICD-10-CM

## 2020-02-24 DIAGNOSIS — I10 ESSENTIAL HYPERTENSION: ICD-10-CM

## 2020-02-24 DIAGNOSIS — E78.5 DYSLIPIDEMIA: ICD-10-CM

## 2020-02-24 RX ORDER — ATORVASTATIN CALCIUM 40 MG/1
TABLET, FILM COATED ORAL
Qty: 30 TAB | Refills: 3 | Status: SHIPPED | OUTPATIENT
Start: 2020-02-24 | End: 2021-02-02

## 2020-02-24 RX ORDER — OLMESARTAN MEDOXOMIL 40 MG/1
40 TABLET ORAL DAILY
Qty: 90 TAB | Refills: 3 | Status: SHIPPED | OUTPATIENT
Start: 2020-02-24 | End: 2021-03-03

## 2020-02-24 RX ORDER — METOPROLOL TARTRATE 50 MG/1
TABLET ORAL
Qty: 60 TAB | Refills: 1 | Status: SHIPPED | OUTPATIENT
Start: 2020-02-24 | End: 2020-03-30

## 2020-02-24 RX ORDER — AZITHROMYCIN 250 MG/1
TABLET, FILM COATED ORAL
Qty: 6 TAB | Refills: 0 | Status: SHIPPED | OUTPATIENT
Start: 2020-02-24 | End: 2020-02-29

## 2020-02-24 NOTE — TELEPHONE ENCOUNTER
Patient thinks she has an upper respitory infection and would like something called in. She needs a 4pm appointment and none available this week. Her number is [phone].

## 2020-03-29 DIAGNOSIS — I10 ESSENTIAL HYPERTENSION: ICD-10-CM

## 2020-03-29 DIAGNOSIS — E78.5 DYSLIPIDEMIA: ICD-10-CM

## 2020-03-29 DIAGNOSIS — I25.10 CORONARY ARTERY DISEASE INVOLVING NATIVE HEART WITHOUT ANGINA PECTORIS, UNSPECIFIED VESSEL OR LESION TYPE: ICD-10-CM

## 2020-03-30 RX ORDER — METOPROLOL TARTRATE 50 MG/1
TABLET ORAL
Qty: 60 TAB | Refills: 1 | Status: SHIPPED | OUTPATIENT
Start: 2020-03-30 | End: 2020-08-16

## 2020-05-12 ENCOUNTER — OFFICE VISIT (OUTPATIENT)
Dept: PRIMARY CARE CLINIC | Age: 70
End: 2020-05-12

## 2020-05-12 DIAGNOSIS — J06.9 UPPER RESPIRATORY TRACT INFECTION, UNSPECIFIED TYPE: Primary | ICD-10-CM

## 2020-05-12 DIAGNOSIS — R05.9 COUGH: ICD-10-CM

## 2020-05-12 RX ORDER — PREDNISONE 10 MG/1
TABLET ORAL
Qty: 1 PACKAGE | Refills: 0 | Status: SHIPPED | OUTPATIENT
Start: 2020-05-12 | End: 2020-08-03

## 2020-05-12 RX ORDER — AZITHROMYCIN 250 MG/1
TABLET, FILM COATED ORAL
Qty: 6 TAB | Refills: 0 | Status: SHIPPED | OUTPATIENT
Start: 2020-05-12 | End: 2020-08-03

## 2020-05-12 NOTE — PROGRESS NOTES
See scanned note    Patient was seen in a Flu Clinic at [address] [place] [address]. The patient verbally consented to being treated and billed for this visit    **  She is a 78 y.o. female who presents for evalution. Reviewed PmHx, RxHx, FmHx, SocHx, AllgHx and updated and dated in the chart. Patient Active Problem List    Diagnosis    Peripheral vascular disease ([facility]    Elevated liver enzymes    Abnormal EKG    Chest pain    Essential hypertension    Hypertension    Dyslipidemia    HSV-2 (herpes simplex virus 2) infection    Closed fracture of right patella    Osteopenia    Chronic hepatitis C ([facility]       Review of Systems - negative except as listed above in the HPI    Objective: There were no vitals filed for this visit. Physical Examination: General appearance - alert, well appearing, and in no distress  No resp distress noted      Assessment/ Plan:   Diagnoses and all orders for this visit:    1. Upper respiratory tract infection, unspecified type  -     azithromycin (ZITHROMAX) 250 mg tablet; Take two tablets today then one tablet daily  -     predniSONE (STERAPRED DS) 10 mg dose pack; 6 Day DS taper pack as directed    2. Cough  -     NOVEL CORONAVIRUS (COVID-19); Future         Patient is presumed covid-19 positive based on criteria and testing performed and given mask today. See scanned note for history, vital details and testing results  There is no evidence of respiratory compromise  Discussed with patient to isolate for 14 days and gave CDC handout on proper isolation   Gave work note if applicable  Discussed with patient to call 911 or go to ED with any inc respiratory sxs    Follow-up and Dispositions    · Return if symptoms worsen or fail to improve. I have discussed the diagnosis with the patient and the intended plan as seen in the above orders. The patient understands and agrees with the plan.  The patient has received an after-visit summary and questions were answered concerning future plans. Medication Side Effects and Warnings were discussed with patient  Patient Labs were reviewed and or requested:  Patient Past Records were reviewed and or requested    Nathan Arana M.D. There are no Patient Instructions on file for this visit.

## 2020-05-14 ENCOUNTER — TELEPHONE (OUTPATIENT)
Dept: FAMILY MEDICINE CLINIC | Age: 70
End: 2020-05-14

## 2020-05-14 NOTE — TELEPHONE ENCOUNTER
----- Message from Denis Molina sent at 2020  1:05 PM EDT -----  Regarding: Dr. Esther Butcher Message/Vendor Calls  To: [facility]  Subject: Dr. Miguel Simons  Patient's first and last name: Tommy Talavera  : 1950  ID numbers:#[id number] [id number]      Caller's first and last name: n/a  Reason for call: Patient has requested that a nurse contacts her with her lab results from her  visit.   Callback required yes/no and why: yes  Best contact number(s):[phone]  Details to clarify the request: [facility]

## 2020-05-14 NOTE — TELEPHONE ENCOUNTER
Advised via vm results have not been processed yet,once processed,resulted,and reviewed pt will be notified.

## 2020-05-15 LAB — SARS-COV-2, NAA: NOT DETECTED

## 2020-06-30 LAB — SARS-COV-2, NAA: NOT DETECTED

## 2020-08-03 ENCOUNTER — OFFICE VISIT (OUTPATIENT)
Dept: CARDIOLOGY CLINIC | Age: 70
End: 2020-08-03
Payer: COMMERCIAL

## 2020-08-03 VITALS
HEART RATE: 60 BPM | WEIGHT: 174 LBS | BODY MASS INDEX: 28.99 KG/M2 | DIASTOLIC BLOOD PRESSURE: 78 MMHG | SYSTOLIC BLOOD PRESSURE: 144 MMHG | HEIGHT: 65 IN | OXYGEN SATURATION: 98 %

## 2020-08-03 DIAGNOSIS — I25.118 CORONARY ARTERY DISEASE OF NATIVE ARTERY OF NATIVE HEART WITH STABLE ANGINA PECTORIS (HCC): ICD-10-CM

## 2020-08-03 DIAGNOSIS — I10 ESSENTIAL HYPERTENSION: Primary | ICD-10-CM

## 2020-08-03 DIAGNOSIS — R06.02 SOB (SHORTNESS OF BREATH): ICD-10-CM

## 2020-08-03 PROCEDURE — 99214 OFFICE O/P EST MOD 30 MIN: CPT | Performed by: SPECIALIST

## 2020-08-03 PROCEDURE — 93000 ELECTROCARDIOGRAM COMPLETE: CPT | Performed by: SPECIALIST

## 2020-08-03 NOTE — PROGRESS NOTES
Ramone Reid MD. [facility]              Patient: Antonio Heading  : 1950      Today's Date: 8/3/2020          HISTORY OF PRESENT ILLNESS:     History of Present Illness:  Here for follow-up. She's had 3 URI's past few months. Has had a cough. Was COVID negative. PAST MEDICAL HISTORY:     Past Medical History:   Diagnosis Date    Arthritis     Asthma     in 25s - no longer a problem    CAD (coronary artery disease)     NSTEMI with PCI (LETICIA)  to RCA 17     Depression     Dyslipidemia     H/O heart artery stent     x1    Hypertension     Ill-defined condition     Hepatitis C    Knee injury     Liver disease     hep c--had treatment, states no longer present    Nausea & vomiting     Seizures (HCC)     Smoker        Past Surgical History:   Procedure Laterality Date    COLONOSCOPY N/A 2019    COLONOSCOPY performed by Luigi Peng MD at [address] [facility] HX COLONOSCOPY      HX CORONARY STENT PLACEMENT      x1    HX ORTHOPAEDIC Bilateral     foot surgery    VASCULAR SURGERY PROCEDURE UNLIST  10-    left leg varicose vein stripping         MEDICATIONS:     Current Outpatient Medications   Medication Sig Dispense Refill    metoprolol tartrate (LOPRESSOR) 50 mg tablet TAKE 1 TABLET BY MOUTH TWICE A DAY 60 Tab 1    atorvastatin (LIPITOR) 40 mg tablet TAKE 1 TABLET BY MOUTH EVERY DAY AT 5 PM 30 Tab 3    olmesartan (BENICAR) 40 mg tablet Take 1 Tab by mouth daily. 90 Tab 3    gabapentin (NEURONTIN) 100 mg capsule Take 1 Cap by mouth two (2) times a day. Max Daily Amount: 200 mg. 60 Cap 1    dilTIAZem CD (CARDIZEM CD) 120 mg ER capsule Take 1 Cap by mouth daily. 90 Cap 1    escitalopram oxalate (LEXAPRO) 10 mg tablet Take 1 Tab by mouth daily. 90 Tab 3    LINZESS 145 mcg cap capsule TAKE 1 CAP BY MOUTH DAILY (BEFORE BREAKFAST). 30 Cap 5    aspirin 81 mg chewable tablet Take 1 Tab by mouth daily.  30 Tab 0       Allergies   Allergen Reactions    Pcn [Penicillins] Unknown (comments)           SOCIAL HISTORY:     Social History     Tobacco Use    Smoking status: Current Every Day Smoker     Packs/day: 0.10    Smokeless tobacco: Never Used    Tobacco comment: smokes 3-4 cigarettes per day x 25 years   Substance Use Topics    Alcohol use: Yes     Comment: about 5 drinks per week    Drug use: No         FAMILY HISTORY:     Family History   Problem Relation Age of Onset    Elevated Lipids Mother     Cancer Father         LUNG AND THROAT    Parkinson's Disease Father     Breast Cancer Paternal Aunt               REVIEW OF SYMPTOMS:       Review of Symptoms:  Constitutional: Negative for fever, chills  HEENT: Negative for nosebleeds, tinnitus, and vision changes. Respiratory: + URI's and cough   Cardiovascular: Negative for syncope, and PND. Gastrointestinal: Negative for abdominal pain, diarrhea, melena. Genitourinary: Negative for dysuria  Musculoskeletal: Negative for myalgias.  + right knee injury 2016  Skin: Negative for rash  Heme: No problems bleeding. Neurological: Negative for speech change and focal weakness. + tired, constipation                   PHYSICAL EXAM:       Physical Exam:  Visit Vitals  /78 (BP 1 Location: Left arm, BP Patient Position: Sitting)   Pulse 60   Ht 5' 5\" (1.651 m)   Wt 174 lb (78.9 kg)   SpO2 98%   BMI 28.96 kg/m²          Patient appears generally well, mood and affect are appropriate and pleasant. HEENT:  Hearing intact, non-icteric, normocephalic, atraumatic. Neck Exam: Supple, No JVD.  + left neck carotid bruits. Lung Exam: Clear to auscultation, even breath sounds. Cardiac Exam: Regular rate and rhythm with no murmur  Abdomen: Soft, non-tender, normal bowel sounds. No bruits or masses. Extremities: Moves all ext well. No lower extremity edema. Vascular: 2+ dorsalis pedis pulses bilaterally.   Psych: Appropriate affect  Neuro - Grossly intact                    LABS / OTHER STUDIES:            Lab Results   Component Value Date/Time    Sodium 142 06/25/2019 10:35 AM    Potassium 5.5 (H) 06/25/2019 10:35 AM    Chloride 106 06/25/2019 10:35 AM    CO2 22 06/25/2019 10:35 AM    Anion gap 9 01/18/2010 11:44 AM    Glucose 106 (H) 06/25/2019 10:35 AM    BUN 21 06/25/2019 10:35 AM    Creatinine 1.10 (H) 06/25/2019 10:35 AM    BUN/Creatinine ratio 19 06/25/2019 10:35 AM    GFR est AA 59 (L) 06/25/2019 10:35 AM    GFR est non-AA 51 (L) 06/25/2019 10:35 AM    Calcium 9.5 06/25/2019 10:35 AM    Bilirubin, total 0.5 06/25/2019 10:35 AM    Alk. phosphatase 63 06/25/2019 10:35 AM    Protein, total 7.7 06/25/2019 10:35 AM    Albumin 4.3 06/25/2019 10:35 AM    Globulin 4.0 01/18/2010 11:44 AM    A-G Ratio 1.3 06/25/2019 10:35 AM    ALT (SGPT) 17 06/25/2019 10:35 AM    AST (SGOT) 28 06/25/2019 10:35 AM     Lab Results   Component Value Date/Time    WBC 6.1 06/25/2019 10:35 AM    HGB 11.8 06/25/2019 10:35 AM    HCT 36.2 06/25/2019 10:35 AM    PLATELET 856 02/54/4990 10:35 AM    MCV 93 06/25/2019 10:35 AM          Lab Results   Component Value Date/Time    Cholesterol, total 153 06/25/2019 10:35 AM    HDL Cholesterol 48 06/25/2019 10:35 AM    LDL, calculated 89 06/25/2019 10:35 AM    VLDL, calculated 16 06/25/2019 10:35 AM    Triglyceride 81 06/25/2019 10:35 AM    CHOL/HDL Ratio 3.3 01/18/2010 11:44 AM                 CARDIAC DIAGNOSTICS:       Cardiac Evaluation Includes:  Echo 5/8/17 - LVEF 55-60%, grade 1 diastology  Exercise Cardiolite 5/8/17 - walked 7:31 (10.1 METS), ischemic EKG changes (2 mm inferior ST depression).  + VALENTE but no CP.  Small, mild, partially reversible defects in anteroseptal and inferior walls.  SSS = 3, SRS = 1, SDS = 2.   Dilated LV with LVEF 37%  Carotid Dopplers 5/8/17 - mild 10-49% stenosis bilat      S/p right femoral thrombectomy and bovine patch plasty of right CFA/PFA 5/17/17 (s/p cath 5/12/17)      EKG 5/29/17 - NSR, LVH  Labs 5/29/17 - Hgb 10.9, LFT's OK     NSTEMI with PCI (LETICIA)  to RCA 5/12/17      Cath 5/12/17 - LM 20-30% narrowing, LAD calcified however good lumen, LCX calcified but OK lumen, RCA with 95% ostial stenosis and 100% proximal occlusion.  Had PCI of RCA with LETICIA.  LVEF 50-55% with inferobasal HK           EKG 4/4/17 - NSR, LVH  EKG 8/3/20 - sinus david, septal Q's                  ASSESSMENT AND PLAN:       Assessment and Plan:  1) Multiple URI's  - will get [facility] records to review   - review labs     2) CAD  - NSTEMI with PCI (LETICIA)  to RCA 5/12/17   - Cath 5/12/17 - LM 20-30% narrowing, LAD calcified however good lumen, LCX calcified but OK lumen, RCA with 95% ostial stenosis and 100% proximal occlusion.  Had PCI of RCA with LETICIA.  LVEF 50-55% with inferobasal HK  - cont ASA, BB, CCB and statin.    - smoking cessation advised       3) Carotid Bruit   - mild carotid disease 5/17      4) HTN  - continue multiple meds   - I asked her to follow BP at home and will reassess next visit       5) Dyslipidemia  - cont statin   - review records and then repeat lipids as needed       6) Chronic Leg pain   - has seen Rheum      7) See me in 1 month with an echo.   Patient expressed understanding of the plan - questions were answered.      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            Ramone Reid MD, [address] [place], [address] [id number] [address].  [address], [address] [place] [address], [address] [place] [address]  Ph: [phone]                                                              [phone]

## 2020-08-03 NOTE — PROGRESS NOTES
Porfirio Culver is a 78 y.o. female    Visit Vitals  /78 (BP 1 Location: Left arm, BP Patient Position: Sitting)   Pulse 60   Ht 5' 5\" (1.651 m)   Wt 174 lb (78.9 kg)   SpO2 98%   BMI 28.96 kg/m²       Chief Complaint   Patient presents with   [facility] Follow Up    Shortness of Breath    Coronary Artery Disease    Hypertension    Other     DLD       Chest pain NO  SOB NO  Dizziness NO  Swelling NO  Recent hospital visit NO  Refills NO

## 2020-08-07 RX ORDER — DILTIAZEM HYDROCHLORIDE 120 MG/1
CAPSULE, COATED, EXTENDED RELEASE ORAL
Qty: 90 CAP | Refills: 1 | Status: SHIPPED | OUTPATIENT
Start: 2020-08-07 | End: 2021-02-04

## 2020-08-16 DIAGNOSIS — E78.5 DYSLIPIDEMIA: ICD-10-CM

## 2020-08-16 DIAGNOSIS — I25.10 CORONARY ARTERY DISEASE INVOLVING NATIVE HEART WITHOUT ANGINA PECTORIS, UNSPECIFIED VESSEL OR LESION TYPE: ICD-10-CM

## 2020-08-16 DIAGNOSIS — I10 ESSENTIAL HYPERTENSION: ICD-10-CM

## 2020-08-16 RX ORDER — METOPROLOL TARTRATE 50 MG/1
TABLET ORAL
Qty: 60 TAB | Refills: 1 | Status: SHIPPED | OUTPATIENT
Start: 2020-08-16 | End: 2020-11-30 | Stop reason: SDUPTHER

## 2020-08-17 ENCOUNTER — TELEPHONE (OUTPATIENT)
Dept: FAMILY MEDICINE CLINIC | Age: 70
End: 2020-08-17

## 2020-08-17 NOTE — TELEPHONE ENCOUNTER
Patient called and states that she has another upper respiratory infection. She states that it is her 4th or 5th. She states that she would like an antibiotic and prednisone called in for it. She states that she thinks she needs to see someone because it is not getting better. Offered virtual and patient declined.  Please call her back at [phone]

## 2020-08-19 NOTE — TELEPHONE ENCOUNTER
Spoke with pt she states she is going to pt 1st however she states she feels she needs to see a specialist. Set pt up with vv to further discuss

## 2020-08-24 ENCOUNTER — VIRTUAL VISIT (OUTPATIENT)
Dept: FAMILY MEDICINE CLINIC | Age: 70
End: 2020-08-24
Payer: COMMERCIAL

## 2020-08-24 DIAGNOSIS — J44.9 CHRONIC OBSTRUCTIVE PULMONARY DISEASE, UNSPECIFIED COPD TYPE (HCC): ICD-10-CM

## 2020-08-24 DIAGNOSIS — J06.9 UPPER RESPIRATORY TRACT INFECTION, UNSPECIFIED TYPE: Primary | ICD-10-CM

## 2020-08-24 PROCEDURE — 99213 OFFICE O/P EST LOW 20 MIN: CPT | Performed by: NURSE PRACTITIONER

## 2020-08-24 NOTE — PROGRESS NOTES
Dru Mac is a 78 y.o. female who was seen by synchronous (real-time) audio-video technology on 8/24/2020 for No chief complaint on file. I spent at least 15 minutes on this visit with this established patient. 712  Subjective:   Patient seen in urgent care last wed  Given pred pack, advair, alb MDI and codeine syrup  cxr was clear  Referred her to pulm for eval for copd  Has not made appt yet  Does feel much better since on the meds    Prior to Admission medications    Medication Sig Start Date End Date Taking? Authorizing Provider   metoprolol tartrate (LOPRESSOR) 50 mg tablet TAKE 1 TABLET BY MOUTH TWICE A DAY 8/16/20   Erma Felix NP   dilTIAZem ER (CARDIZEM CD) 120 mg capsule TAKE 1 CAPSULE BY MOUTH EVERY DAY 8/7/20   Erma Felix NP   atorvastatin (LIPITOR) 40 mg tablet TAKE 1 TABLET BY MOUTH EVERY DAY AT 5 PM 2/24/20   Erma Felix NP   olmesartan (BENICAR) 40 mg tablet Take 1 Tab by mouth daily. 2/24/20   Erma Felix NP   gabapentin (NEURONTIN) 100 mg capsule Take 1 Cap by mouth two (2) times a day. Max Daily Amount: 200 mg. 2/3/20   Erma Felix NP   escitalopram oxalate (LEXAPRO) 10 mg tablet Take 1 Tab by mouth daily. 12/9/19   Erma Felix NP   LINZESS 145 mcg cap capsule TAKE 1 CAP BY MOUTH DAILY (BEFORE BREAKFAST). 11/12/19   Erma Felix NP   aspirin 81 mg chewable tablet Take 1 Tab by mouth daily.  7/17/17   Farheen Tavarez MD     Patient Active Problem List    Diagnosis Date Noted    Coronary artery disease of native artery of native heart with stable angina pectoris ([address] 08/03/2020    Peripheral vascular disease ([facility] 01/20/2020    Elevated liver enzymes 04/30/2018    Abnormal EKG 05/01/2017    Chest pain 05/01/2017    Essential hypertension 05/01/2017    Hypertension     Dyslipidemia     HSV-2 (herpes simplex virus 2) infection 01/31/2017    Closed fracture of right patella 02/02/2016    Osteopenia 12/15/2014    Chronic hepatitis C ([facility] 11/10/2014       ROS  A comprehensive review of system was obtained and negative except findings in the HPI    Objective:   No flowsheet data found. General: alert, cooperative, no distress   Mental  status: normal mood, behavior, speech, dress, motor activity, and thought processes, able to follow commands   HENT: NCAT   Neck: no visualized mass   Resp: no respiratory distress   Neuro: no gross deficits   Skin: no discoloration or lesions of concern on visible areas   Psychiatric: normal affect, consistent with stated mood, no evidence of hallucinations     Additional exam findings: none    Diagnoses and all orders for this visit:    1. Upper respiratory tract infection, unspecified type    2. Chronic obstructive pulmonary disease, unspecified COPD type ([facility]        Advised to stay on the advair until seen by pulm  Call with name, date and time of the appt to do the referral  Complete all rx'd meds from urgent care    We discussed the expected course, resolution and complications of the diagnosis(es) in detail. Medication risks, benefits, costs, interactions, and alternatives were discussed as indicated. I advised her to contact the office if her condition worsens, changes or fails to improve as anticipated. She expressed understanding with the diagnosis(es) and plan. Monse Kelly, who was evaluated through a patient-initiated, synchronous (real-time) audio-video encounter, and/or her healthcare decision maker, is aware that it is a billable service, with coverage as determined by her insurance carrier. She provided verbal consent to proceed: Yes, and patient identification was verified. It was conducted pursuant to the emergency declaration under the [address] [place] [address], [address] [place] [address] authority and the [employer] Act. A caregiver was present when appropriate.  Ability to conduct physical exam was limited. I was in the office. The patient was at home.       Domingo Slaughter NP

## 2020-10-05 ENCOUNTER — ANCILLARY PROCEDURE (OUTPATIENT)
Dept: CARDIOLOGY CLINIC | Age: 70
End: 2020-10-05
Payer: COMMERCIAL

## 2020-10-05 VITALS
WEIGHT: 176 LBS | HEIGHT: 65 IN | DIASTOLIC BLOOD PRESSURE: 78 MMHG | SYSTOLIC BLOOD PRESSURE: 136 MMHG | BODY MASS INDEX: 29.32 KG/M2

## 2020-10-05 DIAGNOSIS — I10 ESSENTIAL HYPERTENSION: ICD-10-CM

## 2020-10-05 DIAGNOSIS — I25.118 CORONARY ARTERY DISEASE OF NATIVE ARTERY OF NATIVE HEART WITH STABLE ANGINA PECTORIS (HCC): ICD-10-CM

## 2020-10-05 DIAGNOSIS — R06.02 SOB (SHORTNESS OF BREATH): ICD-10-CM

## 2020-10-05 LAB
ECHO AO ASC DIAM: 3.08 CM
ECHO AO ROOT DIAM: 2.81 CM
ECHO AV AREA PEAK VELOCITY: 2.21 CM2
ECHO AV AREA VTI: 2.49 CM2
ECHO AV AREA/BSA PEAK VELOCITY: 1.2 CM2/M2
ECHO AV AREA/BSA VTI: 1.3 CM2/M2
ECHO AV MEAN GRADIENT: 3.67 MMHG
ECHO AV PEAK GRADIENT: 7.63 MMHG
ECHO AV PEAK VELOCITY: 138.16 CM/S
ECHO AV VTI: 26.44 CM
ECHO EST RA PRESSURE: 3 MMHG
ECHO LA AREA 4C: 13.86 CM2
ECHO LA MAJOR AXIS: 3.16 CM
ECHO LA MINOR AXIS: 1.69 CM
ECHO LA VOL 2C: 66.9 ML (ref 22–52)
ECHO LA VOL 4C: 35.22 ML (ref 22–52)
ECHO LA VOL BP: 61.23 ML (ref 22–52)
ECHO LA VOL/BSA BIPLANE: 32.68 ML/M2 (ref 16–28)
ECHO LA VOLUME INDEX A2C: 35.71 ML/M2 (ref 16–28)
ECHO LA VOLUME INDEX A4C: 18.8 ML/M2 (ref 16–28)
ECHO LV E' LATERAL VELOCITY: 7.07 CM/S
ECHO LV E' SEPTAL VELOCITY: 5.62 CM/S
ECHO LV EDV A2C: 97.83 ML
ECHO LV EDV A4C: 99.39 ML
ECHO LV EDV BP: 98.27 ML (ref 56–104)
ECHO LV EDV INDEX A4C: 53 ML/M2
ECHO LV EDV INDEX BP: 52.5 ML/M2
ECHO LV EDV NDEX A2C: 52.2 ML/M2
ECHO LV EJECTION FRACTION A2C: 69 PERCENT
ECHO LV EJECTION FRACTION A4C: 55 PERCENT
ECHO LV EJECTION FRACTION BIPLANE: 62.3 PERCENT (ref 55–100)
ECHO LV ESV A2C: 30.74 ML
ECHO LV ESV A4C: 44.24 ML
ECHO LV ESV BP: 37.08 ML (ref 19–49)
ECHO LV ESV INDEX A2C: 16.4 ML/M2
ECHO LV ESV INDEX A4C: 23.6 ML/M2
ECHO LV ESV INDEX BP: 19.8 ML/M2
ECHO LV INTERNAL DIMENSION DIASTOLIC: 4.75 CM (ref 3.9–5.3)
ECHO LV INTERNAL DIMENSION SYSTOLIC: 3.1 CM
ECHO LV IVSD: 0.95 CM (ref 0.6–0.9)
ECHO LV MASS 2D: 147.4 G (ref 67–162)
ECHO LV MASS INDEX 2D: 78.7 G/M2 (ref 43–95)
ECHO LV POSTERIOR WALL DIASTOLIC: 0.87 CM (ref 0.6–0.9)
ECHO LVOT DIAM: 2.12 CM
ECHO LVOT PEAK GRADIENT: 3 MMHG
ECHO LVOT PEAK VELOCITY: 86.56 CM/S
ECHO LVOT SV: 65.8 ML
ECHO LVOT VTI: 18.66 CM
ECHO MV A VELOCITY: 95.29 CM/S
ECHO MV AREA PHT: 2.99 CM2
ECHO MV E DECELERATION TIME (DT): 0.25 S
ECHO MV E VELOCITY: 78.27 CM/S
ECHO MV E/A RATIO: 0.82
ECHO MV E/E' LATERAL: 11.07
ECHO MV E/E' RATIO (AVERAGED): 12.5
ECHO MV E/E' SEPTAL: 13.93
ECHO MV PRESSURE HALF TIME (PHT): 0.07 S
ECHO RA AREA 4C: 12.91 CM2
ECHO RIGHT VENTRICULAR SYSTOLIC PRESSURE (RVSP): 30.18 MMHG
ECHO RV INTERNAL DIMENSION: 3 CM
ECHO RV TAPSE: 2.68 CM (ref 1.5–2)
ECHO TV REGURGITANT MAX VELOCITY: 260.67 CM/S
ECHO TV REGURGITANT PEAK GRADIENT: 27.18 MMHG

## 2020-10-05 PROCEDURE — 93306 TTE W/DOPPLER COMPLETE: CPT | Performed by: SPECIALIST

## 2020-11-30 DIAGNOSIS — I25.10 CORONARY ARTERY DISEASE INVOLVING NATIVE HEART WITHOUT ANGINA PECTORIS, UNSPECIFIED VESSEL OR LESION TYPE: ICD-10-CM

## 2020-11-30 DIAGNOSIS — I10 ESSENTIAL HYPERTENSION: ICD-10-CM

## 2020-11-30 DIAGNOSIS — E78.5 DYSLIPIDEMIA: ICD-10-CM

## 2020-11-30 RX ORDER — METOPROLOL TARTRATE 50 MG/1
TABLET ORAL
Qty: 60 TAB | Refills: 1 | Status: SHIPPED | OUTPATIENT
Start: 2020-11-30 | End: 2021-03-16

## 2020-12-15 ENCOUNTER — TRANSCRIBE ORDER (OUTPATIENT)
Dept: SCHEDULING | Age: 70
End: 2020-12-15

## 2020-12-15 DIAGNOSIS — Z12.31 SCREENING MAMMOGRAM FOR HIGH-RISK PATIENT: Primary | ICD-10-CM

## 2021-01-11 RX ORDER — LINACLOTIDE 145 UG/1
CAPSULE, GELATIN COATED ORAL
Qty: 30 CAP | Refills: 5 | Status: SHIPPED | OUTPATIENT
Start: 2021-01-11 | End: 2021-06-07 | Stop reason: SDUPTHER

## 2021-02-02 RX ORDER — ATORVASTATIN CALCIUM 40 MG/1
TABLET, FILM COATED ORAL
Qty: 90 TAB | Refills: 1 | Status: SHIPPED | OUTPATIENT
Start: 2021-02-02 | End: 2021-05-27 | Stop reason: SDUPTHER

## 2021-02-04 RX ORDER — DILTIAZEM HYDROCHLORIDE 120 MG/1
CAPSULE, COATED, EXTENDED RELEASE ORAL
Qty: 90 CAP | Refills: 1 | Status: SHIPPED | OUTPATIENT
Start: 2021-02-04 | End: 2021-06-07 | Stop reason: SDUPTHER

## 2021-03-03 RX ORDER — OLMESARTAN MEDOXOMIL 40 MG/1
TABLET ORAL
Qty: 90 TAB | Refills: 3 | Status: SHIPPED | OUTPATIENT
Start: 2021-03-03 | End: 2021-06-21 | Stop reason: SDUPTHER

## 2021-03-16 DIAGNOSIS — E78.5 DYSLIPIDEMIA: ICD-10-CM

## 2021-03-16 DIAGNOSIS — I10 ESSENTIAL HYPERTENSION: ICD-10-CM

## 2021-03-16 DIAGNOSIS — I25.10 CORONARY ARTERY DISEASE INVOLVING NATIVE HEART WITHOUT ANGINA PECTORIS, UNSPECIFIED VESSEL OR LESION TYPE: ICD-10-CM

## 2021-03-16 RX ORDER — METOPROLOL TARTRATE 50 MG/1
TABLET ORAL
Qty: 60 TAB | Refills: 1 | Status: SHIPPED | OUTPATIENT
Start: 2021-03-16 | End: 2021-07-20 | Stop reason: SDUPTHER

## 2021-05-11 ENCOUNTER — HOSPITAL ENCOUNTER (OUTPATIENT)
Dept: MAMMOGRAPHY | Age: 71
Discharge: HOME | End: 2021-05-11
Attending: NURSE PRACTITIONER
Payer: MEDICARE

## 2021-05-11 DIAGNOSIS — Z12.31 SCREENING MAMMOGRAM FOR HIGH-RISK PATIENT: ICD-10-CM

## 2021-05-11 PROCEDURE — 77067 SCR MAMMO BI INCL CAD: CPT

## 2021-05-27 RX ORDER — ATORVASTATIN CALCIUM 40 MG/1
TABLET, FILM COATED ORAL
Qty: 90 TABLET | Refills: 0 | Status: SHIPPED | OUTPATIENT
Start: 2021-05-27 | End: 2021-08-27

## 2021-05-27 NOTE — TELEPHONE ENCOUNTER
Refill per VO of Dr. Rasheed Galiciap:  Last appt: 10/5/20  Future Appointments   Date Time Provider Humberto Root   6/21/2021  3:40 PM MD DAVID Gonzales BS AMB       Requested Prescriptions     Signed Prescriptions Disp Refills    atorvastatin (LIPITOR) 40 mg tablet 90 Tablet 0     Sig: TAKE 1 TABLET BY MOUTH EVERY DAY AT 5PM     Authorizing Provider: Miriam Bee     Ordering User: Jordan Lux

## 2021-06-07 RX ORDER — DILTIAZEM HYDROCHLORIDE 120 MG/1
CAPSULE, COATED, EXTENDED RELEASE ORAL
Qty: 90 CAPSULE | Refills: 1 | Status: SHIPPED | OUTPATIENT
Start: 2021-06-07 | End: 2022-10-24

## 2021-06-07 RX ORDER — BUPROPION HYDROCHLORIDE 150 MG/1
TABLET, EXTENDED RELEASE ORAL
Qty: 180 TABLET | Refills: 1 | Status: SHIPPED | OUTPATIENT
Start: 2021-06-07 | End: 2021-06-17 | Stop reason: SDUPTHER

## 2021-06-17 RX ORDER — BUPROPION HYDROCHLORIDE 150 MG/1
150 TABLET, EXTENDED RELEASE ORAL 2 TIMES DAILY
Qty: 180 TABLET | Refills: 1 | Status: SHIPPED | OUTPATIENT
Start: 2021-06-17 | End: 2022-07-06

## 2021-06-21 ENCOUNTER — OFFICE VISIT (OUTPATIENT)
Dept: CARDIOLOGY CLINIC | Age: 71
End: 2021-06-21
Payer: MEDICARE

## 2021-06-21 VITALS
HEART RATE: 66 BPM | OXYGEN SATURATION: 96 % | DIASTOLIC BLOOD PRESSURE: 60 MMHG | SYSTOLIC BLOOD PRESSURE: 110 MMHG | BODY MASS INDEX: 29.66 KG/M2 | HEIGHT: 65 IN | WEIGHT: 178 LBS

## 2021-06-21 DIAGNOSIS — R09.89 CAROTID BRUIT, UNSPECIFIED LATERALITY: ICD-10-CM

## 2021-06-21 DIAGNOSIS — R07.9 CHEST PAIN, UNSPECIFIED TYPE: Primary | ICD-10-CM

## 2021-06-21 DIAGNOSIS — I10 ESSENTIAL HYPERTENSION: ICD-10-CM

## 2021-06-21 DIAGNOSIS — R73.09 ELEVATED GLUCOSE: ICD-10-CM

## 2021-06-21 DIAGNOSIS — E78.5 DYSLIPIDEMIA: ICD-10-CM

## 2021-06-21 PROCEDURE — G9899 SCRN MAM PERF RSLTS DOC: HCPCS | Performed by: SPECIALIST

## 2021-06-21 PROCEDURE — G8432 DEP SCR NOT DOC, RNG: HCPCS | Performed by: SPECIALIST

## 2021-06-21 PROCEDURE — 3017F COLORECTAL CA SCREEN DOC REV: CPT | Performed by: SPECIALIST

## 2021-06-21 PROCEDURE — 1090F PRES/ABSN URINE INCON ASSESS: CPT | Performed by: SPECIALIST

## 2021-06-21 PROCEDURE — G8752 SYS BP LESS 140: HCPCS | Performed by: SPECIALIST

## 2021-06-21 PROCEDURE — G8419 CALC BMI OUT NRM PARAM NOF/U: HCPCS | Performed by: SPECIALIST

## 2021-06-21 PROCEDURE — G8754 DIAS BP LESS 90: HCPCS | Performed by: SPECIALIST

## 2021-06-21 PROCEDURE — G8427 DOCREV CUR MEDS BY ELIG CLIN: HCPCS | Performed by: SPECIALIST

## 2021-06-21 PROCEDURE — 99214 OFFICE O/P EST MOD 30 MIN: CPT | Performed by: SPECIALIST

## 2021-06-21 PROCEDURE — G8536 NO DOC ELDER MAL SCRN: HCPCS | Performed by: SPECIALIST

## 2021-06-21 PROCEDURE — 1101F PT FALLS ASSESS-DOCD LE1/YR: CPT | Performed by: SPECIALIST

## 2021-06-21 PROCEDURE — G8399 PT W/DXA RESULTS DOCUMENT: HCPCS | Performed by: SPECIALIST

## 2021-06-21 RX ORDER — OLMESARTAN MEDOXOMIL 20 MG/1
20 TABLET ORAL DAILY
Qty: 90 TABLET | Refills: 3 | Status: SHIPPED | OUTPATIENT
Start: 2021-06-21 | End: 2022-07-07

## 2021-06-21 NOTE — PROGRESS NOTES
Reema Manley is a 69 y.o. female    Visit Vitals  /60 (BP 1 Location: Left upper arm, BP Patient Position: Sitting, BP Cuff Size: Adult)   Pulse 66   Ht 5' 5\" (1.651 m)   Wt 178 lb (80.7 kg)   SpO2 96%   BMI 29.62 kg/m²       Chief Complaint   Patient presents with    Coronary Artery Disease    Fatigue    Hypertension    Other     DLD       Chest pain NO  SOB NO  Dizziness YES  Swelling NO  Recent hospital visit NO  Refills NO

## 2021-06-21 NOTE — PROGRESS NOTES
Grecia Gao MD. Formerly [facility]              Patient: Juan Carlos Gallego  : 1950      Today's Date: 2021          HISTORY OF PRESENT ILLNESS:     History of Present Illness:  Here for follow-up. She is doing OK overall. Yesterday she was standing and got very dizzy - she felt weak - she sat down and felt OK. Has happened a couple of times. PAST MEDICAL HISTORY:     Past Medical History:   Diagnosis Date    Arthritis     Asthma     in 25s - no longer a problem    CAD (coronary artery disease)     NSTEMI with PCI (LETICIA)  to RCA 17     Depression     Dyslipidemia     H/O heart artery stent     x1    Hypertension     Ill-defined condition     Hepatitis C    Knee injury     Liver disease     hep c--had treatment, states no longer present    Nausea & vomiting     Seizures (HCC)     Smoker          Past Surgical History:   Procedure Laterality Date    COLONOSCOPY N/A 2019    COLONOSCOPY performed by Boris Wayne MD at [facility] HX COLONOSCOPY  2013    HX CORONARY STENT PLACEMENT      x1    HX ORTHOPAEDIC Bilateral     foot surgery    VASCULAR SURGERY PROCEDURE UNLIST  10-    left leg varicose vein stripping           MEDICATIONS:     Current Outpatient Medications   Medication Sig Dispense Refill    buPROPion SR (WELLBUTRIN SR) 150 mg SR tablet Take 1 Tablet by mouth two (2) times a day. 180 Tablet 1    dilTIAZem ER (CARDIZEM CD) 120 mg capsule TAKE 1 CAPSULE BY MOUTH EVERY DAY 90 Capsule 1    linaCLOtide (Linzess) 145 mcg cap capsule TAKE 1 CAP BY MOUTH DAILY (BEFORE BREAKFAST). 90 Capsule 1    atorvastatin (LIPITOR) 40 mg tablet TAKE 1 TABLET BY MOUTH EVERY DAY AT 5PM 90 Tablet 0    metoprolol tartrate (LOPRESSOR) 50 mg tablet TAKE 1 TABLET BY MOUTH TWICE A DAY 60 Tab 1    olmesartan (BENICAR) 40 mg tablet TAKE 1 TABLET BY MOUTH EVERY DAY 90 Tab 3    aspirin 81 mg chewable tablet Take 1 Tab by mouth daily.  30 Tab 0       Allergies   Allergen Reactions    Pcn [Penicillins] Unknown (comments)           SOCIAL HISTORY:     Social History     Tobacco Use    Smoking status: Current Every Day Smoker     Packs/day: 0.10    Smokeless tobacco: Never Used    Tobacco comment: smokes 3-4 cigarettes per day x 25 years   Substance Use Topics    Alcohol use: Yes     Comment: about 5 drinks per week    Drug use: No           FAMILY HISTORY:     Family History   Problem Relation Age of Onset    Elevated Lipids Mother     Cancer Father         LUNG AND THROAT    Parkinson's Disease Father     Breast Cancer Paternal Aunt            REVIEW OF SYMPTOMS:     Review of Symptoms:  Constitutional: Negative for fever, chills  HEENT: Negative for nosebleeds, tinnitus, and vision changes. Respiratory: Negative for cough, wheezing  Cardiovascular: Negative for orthopnea, claudication, syncope, and PND. Gastrointestinal: Negative for abdominal pain, diarrhea, melena. Genitourinary: Negative for dysuria  Musculoskeletal: Negative for myalgias. Skin: Negative for rash  Heme: No problems bleeding. Neurological: Negative for speech change and focal weakness. PHYSICAL EXAM:     Physical Exam:  Visit Vitals  /60 (BP 1 Location: Left upper arm, BP Patient Position: Sitting, BP Cuff Size: Adult)   Pulse 66   Ht 5' 5\" (1.651 m)   Wt 178 lb (80.7 kg)   SpO2 96%   BMI 29.62 kg/m²     Patient appears generally well, mood and affect are appropriate and pleasant. HEENT:  Hearing intact, non-icteric, normocephalic, atraumatic. Neck Exam: Supple, No JVD + left neck bruit   Lung Exam: Clear to auscultation, even breath sounds. Cardiac Exam: Regular rate and rhythm with no murmur or rub  Abdomen: Soft, non-tender, normal bowel sounds   Extremities: Moves all ext well. No lower extremity edema. MSKTL: Overall good ROM ext  Skin: No significant rashes  Vascular: 2+ dorsalis pedis pulses bilaterally.   Psych: Appropriate affect  Neuro - Grossly intact        LABS / OTHER STUDIES reviewed:       Lab Results   Component Value Date/Time    Sodium 142 06/25/2019 10:35 AM    Potassium 5.5 (H) 06/25/2019 10:35 AM    Chloride 106 06/25/2019 10:35 AM    CO2 22 06/25/2019 10:35 AM    Anion gap 9 01/18/2010 11:44 AM    Glucose 106 (H) 06/25/2019 10:35 AM    BUN 21 06/25/2019 10:35 AM    Creatinine 1.10 (H) 06/25/2019 10:35 AM    BUN/Creatinine ratio 19 06/25/2019 10:35 AM    GFR est AA 59 (L) 06/25/2019 10:35 AM    GFR est non-AA 51 (L) 06/25/2019 10:35 AM    Calcium 9.5 06/25/2019 10:35 AM    Bilirubin, total 0.5 06/25/2019 10:35 AM    Alk. phosphatase 63 06/25/2019 10:35 AM    Protein, total 7.7 06/25/2019 10:35 AM    Albumin 4.3 06/25/2019 10:35 AM    Globulin 4.0 01/18/2010 11:44 AM    A-G Ratio 1.3 06/25/2019 10:35 AM    ALT (SGPT) 17 06/25/2019 10:35 AM    AST (SGOT) 28 06/25/2019 10:35 AM     Lab Results   Component Value Date/Time    WBC 6.1 06/25/2019 10:35 AM    HGB 11.8 06/25/2019 10:35 AM    HCT 36.2 06/25/2019 10:35 AM    PLATELET 197 36/38/3439 10:35 AM    MCV 93 06/25/2019 10:35 AM       Lab Results   Component Value Date/Time    Cholesterol, total 153 06/25/2019 10:35 AM    HDL Cholesterol 48 06/25/2019 10:35 AM    LDL, calculated 89 06/25/2019 10:35 AM    VLDL, calculated 16 06/25/2019 10:35 AM    Triglyceride 81 06/25/2019 10:35 AM    CHOL/HDL Ratio 3.3 01/18/2010 11:44 AM           CARDIAC DIAGNOSTICS:     Cardiac Evaluation Includes:  I reviewed the results below. Echo 5/8/17 - LVEF 55-60%, grade 1 diastology  Exercise Cardiolite 5/8/17 - walked 7:31 (10.1 METS), ischemic EKG changes (2 mm inferior ST depression).  + VALENTE but no CP.  Small, mild, partially reversible defects in anteroseptal and inferior walls.  SSS = 3, SRS = 1, SDS = 2.   Dilated LV with LVEF 37%  Carotid Dopplers 5/8/17 - mild 10-49% stenosis bilat      S/p right femoral thrombectomy and bovine patch plasty of right CFA/PFA 5/17/17 (s/p cath 5/12/17)      EKG 5/29/17 - NSR, LVH  Labs 5/29/17 - Hgb 10.9, LFT's OK     NSTEMI with PCI (LETICIA)  to RCA 5/12/17      Cath 5/12/17 - LM 20-30% narrowing, LAD calcified however good lumen, LCX calcified but OK lumen, RCA with 95% ostial stenosis and 100% proximal occlusion.  Had PCI of RCA with LETICIA.  LVEF 50-55% with inferobasal HK     Echo 10/5/20 - LVEF 62%, grade 1 diastology         EKG 4/4/17 - NSR, LVH  EKG 8/3/20 - sinus david, septal Q's              ASSESSMENT AND PLAN:     Assessment and Plan:    1)  CAD  - NSTEMI with PCI (LETICIA)  to RCA 5/12/17   - Cath 5/12/17 - LM 20-30% narrowing, LAD calcified however good lumen, LCX calcified but OK lumen, RCA with 95% ostial stenosis and 100% proximal occlusion.  Had PCI of RCA with LETICIA.  LVEF 50-55% with inferobasal HK  - she feels well and denies CP or SOB  - cont ASA, BB, CCB and statin.    - smoking cessation advised ---> She is using Wellbutrin and has cut back smoking        2) Carotid Bruit   - mild carotid disease 5/17   - recheck doppler next visit      3) HTN  ---> On 6/21/21 - -- due to lightheaded spells, will cut olmesartan dose in half   - continue other meds     4) Dyslipidemia  - cont statin   - recheck labs       5) Chronic Leg pain   - has seen Rheum      6) See me in 6 months with carotid dopplers.   Patient expressed understanding of the plan - questions were answered.      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        Yolanda Tejeda MD, [address]  [address], [place]  Ph: [phone]   Ph [phone]      ADDENDUM   4/11/2022  Carotid Doppler 4/11/22 - < 50% stenosis bilat     Stable - will send a message

## 2021-07-07 LAB
ALBUMIN SERPL-MCNC: 4 G/DL (ref 3.7–4.7)
ALBUMIN/GLOB SERPL: 1.4 {RATIO} (ref 1.2–2.2)
ALP SERPL-CCNC: 80 IU/L (ref 48–121)
ALT SERPL-CCNC: 14 IU/L (ref 0–32)
AST SERPL-CCNC: 26 IU/L (ref 0–40)
BASOPHILS # BLD AUTO: 0 X10E3/UL (ref 0–0.2)
BASOPHILS NFR BLD AUTO: 0 %
BILIRUB SERPL-MCNC: 0.2 MG/DL (ref 0–1.2)
BUN SERPL-MCNC: 24 MG/DL (ref 8–27)
BUN/CREAT SERPL: 17 (ref 12–28)
CALCIUM SERPL-MCNC: 9.3 MG/DL (ref 8.7–10.3)
CHLORIDE SERPL-SCNC: 104 MMOL/L (ref 96–106)
CHOLEST SERPL-MCNC: 150 MG/DL (ref 100–199)
CO2 SERPL-SCNC: 23 MMOL/L (ref 20–29)
CREAT SERPL-MCNC: 1.38 MG/DL (ref 0.57–1)
EOSINOPHIL # BLD AUTO: 1 X10E3/UL (ref 0–0.4)
EOSINOPHIL NFR BLD AUTO: 15 %
ERYTHROCYTE [DISTWIDTH] IN BLOOD BY AUTOMATED COUNT: 12.7 % (ref 11.7–15.4)
GLOBULIN SER CALC-MCNC: 2.9 G/DL (ref 1.5–4.5)
GLUCOSE SERPL-MCNC: 96 MG/DL (ref 65–99)
HBA1C MFR BLD: 5.9 % (ref 4.8–5.6)
HCT VFR BLD AUTO: 34.4 % (ref 34–46.6)
HDLC SERPL-MCNC: 48 MG/DL
HGB BLD-MCNC: 11.2 G/DL (ref 11.1–15.9)
IMM GRANULOCYTES # BLD AUTO: 0 X10E3/UL (ref 0–0.1)
IMM GRANULOCYTES NFR BLD AUTO: 0 %
LDLC SERPL CALC-MCNC: 90 MG/DL (ref 0–99)
LYMPHOCYTES # BLD AUTO: 1.7 X10E3/UL (ref 0.7–3.1)
LYMPHOCYTES NFR BLD AUTO: 27 %
MCH RBC QN AUTO: 31 PG (ref 26.6–33)
MCHC RBC AUTO-ENTMCNC: 32.6 G/DL (ref 31.5–35.7)
MCV RBC AUTO: 95 FL (ref 79–97)
MONOCYTES # BLD AUTO: 0.5 X10E3/UL (ref 0.1–0.9)
MONOCYTES NFR BLD AUTO: 8 %
NEUTROPHILS # BLD AUTO: 3.3 X10E3/UL (ref 1.4–7)
NEUTROPHILS NFR BLD AUTO: 50 %
PLATELET # BLD AUTO: 138 X10E3/UL (ref 150–450)
POTASSIUM SERPL-SCNC: 5.1 MMOL/L (ref 3.5–5.2)
PROT SERPL-MCNC: 6.9 G/DL (ref 6–8.5)
RBC # BLD AUTO: 3.61 X10E6/UL (ref 3.77–5.28)
SODIUM SERPL-SCNC: 140 MMOL/L (ref 134–144)
SPECIMEN STATUS REPORT, ROLRST: NORMAL
TRIGL SERPL-MCNC: 60 MG/DL (ref 0–149)
TSH SERPL DL<=0.005 MIU/L-ACNC: 1.39 UIU/ML (ref 0.45–4.5)
VLDLC SERPL CALC-MCNC: 12 MG/DL (ref 5–40)
WBC # BLD AUTO: 6.5 X10E3/UL (ref 3.4–10.8)

## 2021-07-14 ENCOUNTER — TELEPHONE (OUTPATIENT)
Dept: CARDIOLOGY CLINIC | Age: 71
End: 2021-07-14

## 2021-07-14 NOTE — TELEPHONE ENCOUNTER
Called pt, LVM  Letter sent to pt's home address, \"Labs are overall stable. Please drink water to stay hydrated. \" Yes

## 2021-07-14 NOTE — TELEPHONE ENCOUNTER
Progress Note      Patient Name: Valentina Stevens   Patient ID: [id number]   Sex: Female   YOB: 1964    Referring Provider: Rosalva BENZ    Visit Date: October 19, 2020    Provider: DEMAR Grey   Location: [facility]   Location Address: [address] [place] [address]   [place]  [id number]   Location Phone: [phone]          Chief Complaint  · Annual Exam  · PAP exam  · (Health Maintainence Information Reviewed Under Results)      History Of Present Illness  Last PAP Smear: 10/15/2019.   Date of Last Mammogram: 10/24/2019.   Date of Last Colonoscopy: 11/13/2018   No current complaints.   Valentina Stevens is a 55 year old /White female who presents for evaluation and treatment of:      pt here for the pap smear and will need the mammo     last mammo-2019  BD 2019 osteopenia-  pap 2019  cscope 2018--    pt also brought in the BID testing of her BP on the lowest dose lisinopril--and ranges 110-130's over 60-80    pt reports the migraine HA are ore freq and more intense--and just takes [employer] brand migraine med OTC--helps at time other times does not--pt reports experiencing 2-3 migraines weekly--up from 2-3 monthly--and her last CT of brain 2018 and was good-on nothing preventative --the last one was weird: in the front and into the neck and then left arm--tot he left shoulder--and lasted for couple hrs and happened last Wednesday--took the OTC med and helped after a while--    pt also experiencing seeing spots with the HAs and blurred vision nd these s/s are new    also was supposed to get the ENT referral for the right sided hearing loss-    also needs any updated shots--D/T daughter pregnant and having her baby in March 2021     HTN stable  RLS stable         Past Medical History  Disease Name Date Onset Notes   Depression --  --    Diverticulitis Of Colon --  --    Dysuria --  --    External hemorrhoid --  --    GERD (gastroesophageal reflux disease) --  --  requesting test results for most recent labs, patient is concerned      PHONE:[phone]    Hearing loss --  --    Hematuria --  --    Hypothyroidism --  --    Menopause --  --    Murmur --  --    Osteoporosis --  --    Rash of Skin --  --    Scoliosis --  --    Vitamin D Deficiency --  --          Past Surgical History  Procedure Name Date Notes   Thymectomy --  --    Tubal ligation --  --          Medication List  Name Date Started Instructions   calcium carbonate-vitamin D3 600 mg(1,500mg) -400 unit oral tablet 10/19/2020 TAKE ONE TABLET BY MOUTH TWICE A DAY   levothyroxine 75 mcg oral tablet 10/19/2020 TAKE ONE TABLET BY MOUTH DAILY   lisinopril 2.5 mg oral tablet 10/19/2020 TAKE ONE TABLET BY MOUTH DAILY FOR 30 DAYS   multivitamin oral tablet  --    pramipexole 0.125 mg oral tablet 10/19/2020 take 1 tablet (0.125 mg) by oral route 2-3 hours before bedtime for 30 days         Allergy List  Allergen Name Date Reaction Notes   Cephalexin adverse reaction --  --  --    indomethacin --  --  --    PENICILLINS --  --  --    SULFA (SULFONAMIDES) --  --  --        Allergies Reconciled  Social History  Finding Status Start/Stop Quantity Notes   Alcohol use --  --/-- --  --    Tobacco Never --/-- --  NEVER SMOKER         Immunizations  NameDate Admin Mfg Trade Name Lot Number Route Inj VIS Given VIS Publication   HepA12/20/2018 MSD VAQTA-ADULT [id number]  RA 12/20/2018 01/01/2016   Comments: [id number]   HepA05/17/2018 SKB HAVRIX-ADULT [id number] IM  05/17/2018 07/21/2016   Comments: [id number]   Ywjtcaaox06/19/2020 PMC FLUARIX [id number] IM RD 10/19/2020 08/15/2019   Comments: NDC [id number]   Tdap10/19/2020 PMC ADACEL [id number] IM LD 10/19/2020    Comments: NDC [id number]   Tdap03/20/2018 PMC ADACEL [id number] IM  03/20/2018 02/24/2015   Comments: ndc [id number]         Review of Systems  · Constitutional  o Denies  o : appetite change, fatigue, night sweats, weight gain, weight loss  · HENT  o Denies  o : hearing loss, tinnitus, vertigo, nasal discharge, nose bleeding, dental problems, oral lesions, sore  "throat  · Breasts  o Denies  o : lumps, tenderness, swelling, nipple discharge  · Cardiovascular  o Denies  o : chest pain, decreased exercise tolerance, dyspnea on exertion, palpitations  · Respiratory  o Denies  o : cough, shortness of breath, wheezing, snoring, apneas  · Gastrointestinal  o Denies  o : abdominal pain, nausea, vomiting, dysphagia, heartburn, changes in bowel habits, constipation, diarrhea  · Genitourinary  o Denies  o : dysuria, hematuria, incontinence, nocturia, frequency, urgency, sexual dysfunction  · Integument  o Denies  o : rash, itching  · Neurologic  o Denies  o : abnormal gait, facial weakness, headache, memory difficulties, tingling or numbness, seizures, tremors  · Musculoskeletal  o Denies  o : joint swelling, limitation of motion  · Endocrine  o Denies  o : cold intolerance, heat intolerance  · Psychiatric  o Denies  o : anxiety, decreased concentration, irritability, panic attacks, sleep distrubances, sadness/tearfulness  · Heme-Lymph  o Denies  o : petechiae, lymph node enlargement or tenderness  · Allergic-Immunologic  o Denies  o : frequent illnesses      Vitals  Date Time BP Position Site L\R Cuff Size HR RR TEMP (F) WT  HT  BMI kg/m2 BSA m2 O2 Sat FR L/min FiO2        10/19/2020 03:04 /64 Sitting    84 - R  97.8 153lbs 9oz 5'  3\" 27.2 1.76 97 %            Physical Examination  · Constitutional  o Appearance  o : well-nourished, in no acute distress  · Neck  o Inspection/Palpation  o : normal appearance, no masses or tenderness, trachea midline  o Range of Motion  o : cervical range of motion within normal limits  o Thyroid  o : gland size normal, nontender, no nodules or masses present on palpation  · Respiratory  o Respiratory Effort  o : breathing unlabored  o Inspection of Chest  o : normal appearance  o Auscultation of Lungs  o : normal breath sounds throughout  · Cardiovascular  o Heart  o :   § Auscultation of Heart  § : regular rate and rhythm, no murmurs, gallops " or rubs  o Peripheral Vascular System  o :   § Carotid Arteries  § : normal pulses bilaterally, no bruits present  § Pedal Pulses  § : pulses 2 bilaterally  § Extremities  § : no edema  · Breasts  o Inspection of Breasts  o : breasts symmetrical, no skin changes, no deformities present, no discharge present  o Palpation of Breasts, Axillae  o : no masses present on palpation, no breast tenderness  · Gastrointestinal  o Abdominal Examination  o : abdomen nontender to palpation, tone normal without rigidity or guarding, no masses present, normal bowel sounds  · Genitourinary  o External Genitalia  o : no inflammation, no lesions present  o Vagina  o : normal vaginal vault, no discharge present, no inflammatory lesions present, no masses present  o Urethra  o :   § Urethral Meatus  § : no inflammation present  § Urethral Body  § : urethra palpation normal  o Bladder  o : nontender to palpation  o Cervix  o : appearance healthy, no lesions present, nontender to palpation, no discharges, no bleeding present, normal midline position  o Uterus  o : nontender to palpation, no masses present, position midline/midplane  o Adnexa  o : no tenderness or masses present on bimanual examination  o Anus  o : no inflammation or lesions present  o Perineum  o : perineum within normal limits  · Lymphatic  o Neck  o : no lymphadenopathy present  o Axilla  o : no lymphadenopathy present  o Groin  o : no lymphadenopathy present  · Neurologic  o Mental Status Examination  o :   § Orientation  § : grossly oriented to person, place and time  o Gait and Station  o : normal gait, able to stand without difficulty  · Psychiatric  o Judgement and Insight  o : judgment and insight intact  o Mood and Affect  o : mood normal, affect appropriate     also a small 2 mm freckle flat and brown to the left upper thigh               Assessment  · Normal Pap Smear       Encounter for gynecological examination (general) (routine) without abnormal  findings     V76.2/Z01.419  · Routine gynecological examination     V72.31/Z01.419  · Screening Mammogram     V76.10/Z12.39  · Need for influenza vaccination     V04.81/Z23  · Essential hypertension     401.9/I10  · Headache     784.0/R51  · Hypothyroidism     244.9/E03.9  · Blurred vision     368.8/H53.8  · Visual disturbance     368.9/H53.9  · Hearing loss     389.9/H91.90  right sided   · Vaccine counseling     V65.49/Z71.89  · Osteopenia     733.90/M85.80      Plan  · Orders  o Pap smear (62944) - V76.2/Z01.419 - 10/19/2020  o Screening Mammogram 2D Bilateral (, 22303) - V76.10/Z12.39 - 10/19/2020  o HTN/Lipid Panel (CMP, Lipid) [facility] (31253, 59243) - 401.9/I10 - 10/19/2020  o CBC with Auto Diff [facility] (40366) - 401.9/I10 - 10/19/2020  o CT brain wo radiopaque contrast (38235) - 784.0/R51, 368.8/H53.8, 368.9/H53.9, 389.9/H91.90 - 10/19/2020   right sided hearing loss   o Thyroid Profile (THYII) - 244.9/E03.9 - 10/19/2020  o IOP - Urinalysis without Microscopy (Clinitek) [facility] (26676) - V76.2/Z01.419, V72.31/Z01.419 - 10/19/2020   GLU-NEG DAVI-NEG KET-NEG SG-1.025 BLO-TRACE-INTACT PH-7.0 PRO-NEG URO-0.2 NIT-NEG AGUSTIN-NEG  o Immunization Admin Fee (2+ Injections) ([facility]) (31654) - V65.49/Z71.89, V04.81/Z23 - 10/19/2020  o ACO-14: Influenza immunization administered or previously received () - - 10/19/2020  o ACO-19: Colorectal cancer screening results documented and reviewed (3017F) - - 10/19/2020   2018  o ACO-20: Screening Mammography documented and reviewed () - - 10/19/2020  o ACO-39: Current medications updated and reviewed (1159F, ) - - 10/19/2020  o ENT CONSULTATION ([facility]) - 389.9/H91.90 - 10/19/2020   right sided   o Adacel TDaP Vaccine [facility] (55668) - V65.49/Z71.89 - 10/19/2020   Vaccine - Tdap; Dose: 0.5; Site: Left Deltoid; Route: Intramuscular; Date: 10/19/2020 15:50:00; Exp: 07/26/2019; Lot: [id number]; Mfg: Close pasteur; TradeName: ADACEL; Administered By: Lindsey Maharaj MA; Comment: NDC  [id number]  o Fluzone Quadrivalent Vaccine, age 3+ (43341) - V04.81/Z23 - 10/19/2020   Vaccine - Influenza; Dose: 0.5; Site: Right Deltoid; Route: Intramuscular; Date: 10/19/2020 15:49:00; Exp: 06/30/2021; Lot: [id number]; Mfg: sanofi pasteur; TradeName: FLUARIX; Administered By: Lindesy Maharaj MA; Comment: NDC [id number]  · Medications  o Topamax 25 mg oral tablet   SIG: take 1 tablet (25 mg) by oral route once daily at bedtime for 30 days   DISP: (30) Tablet with 0 refills  Prescribed on 10/19/2020     o calcium carbonate-vitamin D3 600 mg(1,500mg) -400 unit oral tablet   SIG: TAKE ONE TABLET BY MOUTH TWICE A DAY   DISP: (60) Tablet with 11 refills  Adjusted on 10/19/2020     o levothyroxine 75 mcg oral tablet   SIG: TAKE ONE TABLET BY MOUTH DAILY   DISP: (30) Tablet with 5 refills  Adjusted on 10/19/2020     o lisinopril 2.5 mg oral tablet   SIG: TAKE ONE TABLET BY MOUTH DAILY FOR 30 DAYS   DISP: (30) Tablet with 5 refills  Adjusted on 10/19/2020     o pramipexole 0.125 mg oral tablet   SIG: take 1 tablet (0.125 mg) by oral route 2-3 hours before bedtime for 30 days   DISP: (30) Tablet with 5 refills  Adjusted on 10/19/2020     o Medications have been Reconciled  o Transition of Care or Provider Policy  · Instructions  o Patient advised to monitor blood pressure (B/P) at home and journal readings. Patient informed that a B/P reading at home of more than 135/85 is considered hypertension. For readings greater [id number]/90 or higher patient is advised to follow up in the office with readings for management. Patient advised to limit sodium intake.  o Counseled on monthly breast self exams.   o Counseled on STD prevention.  o Counseled on diet and exercise.   o Counseled on weight-bearing exercise.  o Recommended Calcium with Vitamin D twice daily.  o Used cytobrush to obtain Pap smear specimen. Sent to pathology for testing and review.  o Take all medications as prescribed/directed.  o Patient was educated/instructed on  their diagnosis, treatment and medications prior to discharge from the clinic today.  o Patient instructed to seek medical attention urgently for new or worsening symptoms.  o Call the office with any concerns or questions.  o Risks, benefits, and alternatives were discussed with the patient. The patient is aware of risks associated with: med mgt   o Chronic conditions reviewed and taken into consideration for today's treatment plan.  · Disposition  o Call or Return if symptoms worsen or persist.  o Return Visit Request in/on 4 weeks +/- 2 days (0863).            Electronically Signed by: DEMAR Grey -Author on October 19, 2020 04:04:34 PM

## 2021-07-20 DIAGNOSIS — I10 ESSENTIAL HYPERTENSION: ICD-10-CM

## 2021-07-20 DIAGNOSIS — I25.10 CORONARY ARTERY DISEASE INVOLVING NATIVE HEART WITHOUT ANGINA PECTORIS, UNSPECIFIED VESSEL OR LESION TYPE: ICD-10-CM

## 2021-07-20 DIAGNOSIS — E78.5 DYSLIPIDEMIA: ICD-10-CM

## 2021-07-20 RX ORDER — METOPROLOL TARTRATE 50 MG/1
TABLET ORAL
Qty: 60 TABLET | Refills: 1 | Status: SHIPPED | OUTPATIENT
Start: 2021-07-20 | End: 2021-08-20

## 2021-08-03 PROBLEM — I10 HYPERTENSION: Status: RESOLVED | Noted: 2021-08-03 | Resolved: 2021-08-03

## 2021-08-20 DIAGNOSIS — I25.10 CORONARY ARTERY DISEASE INVOLVING NATIVE HEART WITHOUT ANGINA PECTORIS, UNSPECIFIED VESSEL OR LESION TYPE: ICD-10-CM

## 2021-08-20 DIAGNOSIS — I10 ESSENTIAL HYPERTENSION: ICD-10-CM

## 2021-08-20 DIAGNOSIS — E78.5 DYSLIPIDEMIA: ICD-10-CM

## 2021-08-20 RX ORDER — METOPROLOL TARTRATE 50 MG/1
TABLET ORAL
Qty: 60 TABLET | Refills: 1 | Status: SHIPPED | OUTPATIENT
Start: 2021-08-20 | End: 2021-10-05 | Stop reason: SDUPTHER

## 2021-08-27 RX ORDER — ATORVASTATIN CALCIUM 40 MG/1
TABLET, FILM COATED ORAL
Qty: 90 TABLET | Refills: 3 | Status: SHIPPED | OUTPATIENT
Start: 2021-08-27

## 2021-08-27 NOTE — TELEPHONE ENCOUNTER
Refill per VO of Dr. Elva Iglesias  Last appt: 6/21/21  Future Appointments   Date Time Provider Humberto Root   9/21/2021  9:15 AM JOSSIE Bridges BS [facility]   12/9/2021 11:00 AM VASCULAR, BRIDGETT MCGARRY [facility]   12/9/2021 11:40 AM Esther Uribe MD [facility] AMB       Requested Prescriptions     Signed Prescriptions Disp Refills    atorvastatin (LIPITOR) 40 mg tablet 90 Tablet 3     Sig: TAKE 1 TABLET EVERY DAY AT 5PM     Authorizing Provider: Melissa Hale     Ordering User: Suyapa Campbell

## 2021-09-21 ENCOUNTER — OFFICE VISIT (OUTPATIENT)
Dept: FAMILY MEDICINE CLINIC | Age: 71
End: 2021-09-21
Payer: MEDICARE

## 2021-09-21 VITALS
HEART RATE: 62 BPM | TEMPERATURE: 98.3 F | OXYGEN SATURATION: 95 % | RESPIRATION RATE: 16 BRPM | HEIGHT: 65 IN | DIASTOLIC BLOOD PRESSURE: 77 MMHG | BODY MASS INDEX: 28.99 KG/M2 | WEIGHT: 174 LBS | SYSTOLIC BLOOD PRESSURE: 138 MMHG

## 2021-09-21 DIAGNOSIS — I73.9 PERIPHERAL VASCULAR DISEASE (HCC): ICD-10-CM

## 2021-09-21 DIAGNOSIS — E78.5 DYSLIPIDEMIA: ICD-10-CM

## 2021-09-21 DIAGNOSIS — J44.9 CHRONIC OBSTRUCTIVE PULMONARY DISEASE, UNSPECIFIED COPD TYPE (HCC): ICD-10-CM

## 2021-09-21 DIAGNOSIS — F32.A DEPRESSION, UNSPECIFIED DEPRESSION TYPE: ICD-10-CM

## 2021-09-21 DIAGNOSIS — I25.118 CORONARY ARTERY DISEASE OF NATIVE ARTERY OF NATIVE HEART WITH STABLE ANGINA PECTORIS (HCC): ICD-10-CM

## 2021-09-21 DIAGNOSIS — N28.9 RENAL FUNCTION IMPAIRMENT: ICD-10-CM

## 2021-09-21 DIAGNOSIS — E87.5 HYPERKALEMIA: ICD-10-CM

## 2021-09-21 DIAGNOSIS — B18.2 CHRONIC HEPATITIS C WITHOUT HEPATIC COMA (HCC): ICD-10-CM

## 2021-09-21 DIAGNOSIS — Z00.00 WELL ADULT EXAM: Primary | ICD-10-CM

## 2021-09-21 DIAGNOSIS — M25.511 ACUTE PAIN OF RIGHT SHOULDER: ICD-10-CM

## 2021-09-21 DIAGNOSIS — I10 ESSENTIAL HYPERTENSION: ICD-10-CM

## 2021-09-21 PROBLEM — F33.1 MAJOR DEPRESSIVE DISORDER, RECURRENT, MODERATE (HCC): Status: ACTIVE | Noted: 2021-09-21

## 2021-09-21 PROCEDURE — G8536 NO DOC ELDER MAL SCRN: HCPCS | Performed by: NURSE PRACTITIONER

## 2021-09-21 PROCEDURE — 3017F COLORECTAL CA SCREEN DOC REV: CPT | Performed by: NURSE PRACTITIONER

## 2021-09-21 PROCEDURE — G8419 CALC BMI OUT NRM PARAM NOF/U: HCPCS | Performed by: NURSE PRACTITIONER

## 2021-09-21 PROCEDURE — 99214 OFFICE O/P EST MOD 30 MIN: CPT | Performed by: NURSE PRACTITIONER

## 2021-09-21 PROCEDURE — 1090F PRES/ABSN URINE INCON ASSESS: CPT | Performed by: NURSE PRACTITIONER

## 2021-09-21 PROCEDURE — G9899 SCRN MAM PERF RSLTS DOC: HCPCS | Performed by: NURSE PRACTITIONER

## 2021-09-21 PROCEDURE — G8427 DOCREV CUR MEDS BY ELIG CLIN: HCPCS | Performed by: NURSE PRACTITIONER

## 2021-09-21 PROCEDURE — G8432 DEP SCR NOT DOC, RNG: HCPCS | Performed by: NURSE PRACTITIONER

## 2021-09-21 PROCEDURE — G8752 SYS BP LESS 140: HCPCS | Performed by: NURSE PRACTITIONER

## 2021-09-21 PROCEDURE — G8754 DIAS BP LESS 90: HCPCS | Performed by: NURSE PRACTITIONER

## 2021-09-21 PROCEDURE — G0439 PPPS, SUBSEQ VISIT: HCPCS | Performed by: NURSE PRACTITIONER

## 2021-09-21 PROCEDURE — G8399 PT W/DXA RESULTS DOCUMENT: HCPCS | Performed by: NURSE PRACTITIONER

## 2021-09-21 PROCEDURE — 1101F PT FALLS ASSESS-DOCD LE1/YR: CPT | Performed by: NURSE PRACTITIONER

## 2021-09-21 RX ORDER — CHOLECALCIFEROL (VITAMIN D3) 125 MCG
CAPSULE ORAL
COMMUNITY

## 2021-09-21 RX ORDER — DULOXETIN HYDROCHLORIDE 60 MG/1
60 CAPSULE, DELAYED RELEASE ORAL DAILY
Qty: 30 CAPSULE | Refills: 2 | Status: SHIPPED | OUTPATIENT
Start: 2021-09-21 | End: 2022-10-24

## 2021-09-21 NOTE — PROGRESS NOTES
Chief Complaint   Patient presents with    Annual Wellness Visit    Labs     Pt being seen for wellness visit  -labs    *Add depression to problem list    1. Have you been to the ER, urgent care clinic since your last visit? Hospitalized since your last visit? No    2. Have you seen or consulted any other health care providers outside of the [address] [place] [address] since your last visit? Include any pap smears or colon screening.  No     Pt has no other concerns

## 2021-09-21 NOTE — PROGRESS NOTES
This is the Subsequent Medicare Annual Wellness Exam, performed 12 months or more after the Initial AWV or the last Subsequent AWV    I have reviewed the patient's medical history in detail and updated the computerized patient record. She is also here today for follow up for fasting labs  Managed for htn, hypercholesterol, CAD and COPD  Breathing stable  See cardio for CAD and PVD  Admits that her depression is much worse and wellbutrin helps with smoking but not depression  Not wanting to leave the house  Seeing Ortho actively for shoulder pain, injection next week, wants pain med to take edge off  Assessment/Plan   Education and counseling provided:  Are appropriate based on today's review and evaluation    1. Well adult exam  -     CBC WITH AUTOMATED DIFF; Future  -     METABOLIC PANEL, COMPREHENSIVE; Future  -     TSH 3RD GENERATION; Future  -     LIPID PANEL; Future  2. Chronic obstructive pulmonary disease, unspecified COPD type ([facility] [address] - down to 2 cigs a day  3. Peripheral vascular disease ([facility] [address] - sees cardio  4. Chronic hepatitis C without hepatic coma (HCC) - did the treatment  5. Coronary artery disease of native artery of native heart with stable angina pectoris ([facility] [address] - seen by cardio  6. Essential hypertension - labs updated  -     CBC WITH AUTOMATED DIFF; Future  -     METABOLIC PANEL, COMPREHENSIVE; Future  -     TSH 3RD GENERATION; Future  -     LIPID PANEL; Future  7. Dyslipidemia- labs  -     CBC WITH AUTOMATED DIFF; Future  -     METABOLIC PANEL, COMPREHENSIVE; Future  -     LIPID PANEL; Future  8. Depression, unspecified depression type - start Cymbalta 60mg one a day, 1/2 the first week  9.  Acute pain of right shoulder - sent in Tramadol for pain   Cont plan to do [facility]         Depression Risk Factor Screening     3 most recent PHQ Screens 9/21/2021   PHQ Not Done -   Little interest or pleasure in doing things Nearly every day   Feeling down, depressed, irritable, or hopeless Nearly every day   Total Score PHQ 2 6   Trouble falling or staying asleep, or sleeping too much Not at all   Feeling tired or having little energy Not at all   Poor appetite, weight loss, or overeating Nearly every day   Feeling bad about yourself - or that you are a failure or have let yourself or your family down Nearly every day   Trouble concentrating on things such as school, work, reading, or watching TV Nearly every day   Moving or speaking so slowly that other people could have noticed; or the opposite being so fidgety that others notice Not at all   Thoughts of being better off dead, or hurting yourself in some way Not at all   PHQ 9 Score 15   How difficult have these problems made it for you to do your work, take care of your home and get along with others Not difficult at all       Alcohol Risk Screen    Do you average more than 1 drink per night or more than 7 drinks a week:  No    On any one occasion in the past three months have you have had more than 3 drinks containing alcohol:  Yes        Functional Ability and Level of Safety    Hearing: The patient needs further evaluation. Activities of Daily Living: The home contains: no safety equipment. Patient does total self care      Ambulation: with no difficulty     Fall Risk:  Fall Risk Assessment, last 12 mths 9/21/2021   Able to walk? Yes   Fall in past 12 months? 0   Do you feel unsteady? 0   Are you worried about falling 0   Number of falls in past 12 months -   Fall with injury?  -      Abuse Screen:  Patient is not abused       Cognitive Screening    Has your family/caregiver stated any concerns about your memory: no     Cognitive Screening: Normal - Mini Cog Test    Health Maintenance Due     Health Maintenance Due   Topic Date Due    COVID-19 Vaccine (1) Never done    Shingrix Vaccine Age 50> (1 of 2) Never done    Medicare Yearly Exam  05/11/2021    Flu Vaccine (1) 09/01/2021       Patient Care Team   Patient Care Team:  Dee Soriano NP as PCP - General (Family Medicine)  Dee Soriano NP as PCP [facility]  Edgardo Barth LPN as Ambulatory Care Manager  Yasmin Frye MD (Neurology)    History     Patient Active Problem List   Diagnosis Code    Chronic hepatitis C ([facility] [address] B18.2    Osteopenia M85.80    Closed fracture of right patella S82.001A    HSV-2 (herpes simplex virus 2) infection B00.9    Abnormal EKG R94.31    Chest pain R07.9    Essential hypertension I10    Dyslipidemia E78.5    Elevated liver enzymes R74.8    Peripheral vascular disease ([facility] [address] I73.9    Coronary artery disease of native artery of native heart with stable angina pectoris ([address] I25.118    Depression F32.9    Major depressive disorder, recurrent, moderate F33.1     Past Medical History:   Diagnosis Date    Arthritis     Asthma     in 25s - no longer a problem    CAD (coronary artery disease)     NSTEMI with PCI ([facility])  to RCA 5/12/17     Depression     Dyslipidemia     H/O heart artery stent     x1    Hypertension     Ill-defined condition     Hepatitis C    Knee injury     Liver disease     hep c--had treatment, states no longer present    Nausea & vomiting     Seizures ([address]     Smoker       Past Surgical History:   Procedure Laterality Date    COLONOSCOPY N/A 1/14/2019    COLONOSCOPY performed by Gaurav Suresh MD at [address] HX COLONOSCOPY  2013    HX CORONARY STENT PLACEMENT      x1    HX ORTHOPAEDIC Bilateral     foot surgery    VASCULAR SURGERY PROCEDURE UNLIST      left leg varicose vein stripping     Current Outpatient Medications   Medication Sig Dispense Refill    cholecalciferol, vitamin D3, (Vitamin D3) 50 mcg (2,000 unit) tab Take  by mouth.  DULoxetine (CYMBALTA) 60 mg capsule Take 1 Capsule by mouth daily.  30 Capsule 2    atorvastatin (LIPITOR) 40 mg tablet TAKE 1 TABLET EVERY DAY AT 5PM 90 Tablet 3    metoprolol tartrate (LOPRESSOR) 50 mg tablet TAKE 1 TABLET BY MOUTH TWICE A DAY 60 Tablet 1    olmesartan (BENICAR) 20 mg tablet Take 1 Tablet by mouth daily. 90 Tablet 3    buPROPion SR (WELLBUTRIN SR) 150 mg SR tablet Take 1 Tablet by mouth two (2) times a day. 180 Tablet 1    dilTIAZem ER (CARDIZEM CD) 120 mg capsule TAKE 1 CAPSULE BY MOUTH EVERY DAY 90 Capsule 1    linaCLOtide (Linzess) 145 mcg cap capsule TAKE 1 CAP BY MOUTH DAILY (BEFORE BREAKFAST). 90 Capsule 1    aspirin 81 mg chewable tablet Take 1 Tab by mouth daily.  30 Tab 0     Allergies   Allergen Reactions    Pcn [Penicillins] Unknown (comments)       Family History   Problem Relation Age of Onset    Elevated Lipids Mother     Cancer Father         LUNG AND THROAT    Parkinson's Disease Father     Breast Cancer Paternal Aunt      Social History     Tobacco Use    Smoking status: Current Every Day Smoker     Packs/day: 0.10    Smokeless tobacco: Never Used    Tobacco comment: smokes 3-4 cigarettes per day x 25 years   Substance Use Topics    Alcohol use: Yes     Comment: about 5 drinks per week         Aruna Armando NP

## 2021-09-21 NOTE — PATIENT INSTRUCTIONS
Medicare Wellness Visit, Female     The best way to live healthy is to have a lifestyle where you eat a well-balanced diet, exercise regularly, limit alcohol use, and quit all forms of tobacco/nicotine, if applicable. Regular preventive services are another way to keep healthy. Preventive services (vaccines, screening tests, monitoring & exams) can help personalize your care plan, which helps you manage your own care. Screening tests can find health problems at the earliest stages, when they are easiest to treat. [facility] follows the current, evidence-based guidelines published by the [facility] Fernando Coronel ([facility]) when recommending preventive services for our patients. Because we follow these guidelines, sometimes recommendations change over time as research supports it. (For example, mammograms used to be recommended annually. Even though Medicare will still pay for an annual mammogram, the newer guidelines recommend a mammogram every two years for women of average risk). Of course, you and your doctor may decide to screen more often for some diseases, based on your risk and your co-morbidities (chronic disease you are already diagnosed with). Preventive services for you include:  - Medicare offers their members a free annual wellness visit, which is time for you and your primary care provider to discuss and plan for your preventive service needs. Take advantage of this benefit every year!  -All adults over the age of 72 should receive the recommended pneumonia vaccines. Current USPSTF guidelines recommend a series of two vaccines for the best pneumonia protection.   -All adults should have a flu vaccine yearly and a tetanus vaccine every 10 years.   -All adults age 48 and older should receive the shingles vaccines (series of two vaccines).       -All adults age 38-68 who are overweight should have a diabetes screening test once every three years.   -All adults born between 80 and 1965 should be screened once for Hepatitis C.  -Other screening tests and preventive services for persons with diabetes include: an eye exam to screen for diabetic retinopathy, a kidney function test, a foot exam, and stricter control over your cholesterol.   -Cardiovascular screening for adults with routine risk involves an electrocardiogram (ECG) at intervals determined by your doctor.   -Colorectal cancer screenings should be done for adults age 54-65 with no increased risk factors for colorectal cancer. There are a number of acceptable methods of screening for this type of cancer. Each test has its own benefits and drawbacks. Discuss with your doctor what is most appropriate for you during your annual wellness visit. The different tests include: colonoscopy (considered the best screening method), a fecal occult blood test, a fecal DNA test, and sigmoidoscopy.    -A bone mass density test is recommended when a woman turns 65 to screen for osteoporosis. This test is only recommended one time, as a screening. Some providers will use this same test as a disease monitoring tool if you already have osteoporosis. -Breast cancer screenings are recommended every other year for women of normal risk, age 54-69.  -Cervical cancer screenings for women over age 72 are only recommended with certain risk factors. Here is a list of your current Health Maintenance items (your personalized list of preventive services) with a due date:  Health Maintenance Due   Topic Date Due    COVID-19 Vaccine (1) Never done    Shingles Vaccine (1 of 2) Never done    Annual Well Visit  05/11/2021    Yearly Flu Vaccine (1) 09/01/2021         Medicare Wellness Visit, Female     The best way to live healthy is to have a lifestyle where you eat a well-balanced diet, exercise regularly, limit alcohol use, and quit all forms of tobacco/nicotine, if applicable.      Regular preventive services are another way to keep healthy. Preventive services (vaccines, screening tests, monitoring & exams) can help personalize your care plan, which helps you manage your own care. Screening tests can find health problems at the earliest stages, when they are easiest to treat. [facility] follows the current, evidence-based guidelines published by the [facility] Fernando Coronel ([facility]) when recommending preventive services for our patients. Because we follow these guidelines, sometimes recommendations change over time as research supports it. (For example, mammograms used to be recommended annually. Even though Medicare will still pay for an annual mammogram, the newer guidelines recommend a mammogram every two years for women of average risk). Of course, you and your doctor may decide to screen more often for some diseases, based on your risk and your co-morbidities (chronic disease you are already diagnosed with). Preventive services for you include:  - Medicare offers their members a free annual wellness visit, which is time for you and your primary care provider to discuss and plan for your preventive service needs. Take advantage of this benefit every year!  -All adults over the age of 72 should receive the recommended pneumonia vaccines. Current USPSTF guidelines recommend a series of two vaccines for the best pneumonia protection.   -All adults should have a flu vaccine yearly and a tetanus vaccine every 10 years.   -All adults age 48 and older should receive the shingles vaccines (series of two vaccines).       -All adults age 38-68 who are overweight should have a diabetes screening test once every three years.   -All adults born between 80 and 1965 should be screened once for Hepatitis C.  -Other screening tests and preventive services for persons with diabetes include: an eye exam to screen for diabetic retinopathy, a kidney function test, a foot exam, and stricter control over your cholesterol.   -Cardiovascular screening for adults with routine risk involves an electrocardiogram (ECG) at intervals determined by your doctor.   -Colorectal cancer screenings should be done for adults age 54-65 with no increased risk factors for colorectal cancer. There are a number of acceptable methods of screening for this type of cancer. Each test has its own benefits and drawbacks. Discuss with your doctor what is most appropriate for you during your annual wellness visit. The different tests include: colonoscopy (considered the best screening method), a fecal occult blood test, a fecal DNA test, and sigmoidoscopy.    -A bone mass density test is recommended when a woman turns 65 to screen for osteoporosis. This test is only recommended one time, as a screening. Some providers will use this same test as a disease monitoring tool if you already have osteoporosis. -Breast cancer screenings are recommended every other year for women of normal risk, age 54-69.  -Cervical cancer screenings for women over age 72 are only recommended with certain risk factors.      Here is a list of your current Health Maintenance items (your personalized list of preventive services) with a due date:  Health Maintenance Due   Topic Date Due    COVID-19 Vaccine (1) Never done    Shingles Vaccine (1 of 2) Never done    Annual Well Visit  05/11/2021    Yearly Flu Vaccine (1) 09/01/2021

## 2021-09-22 LAB
ALBUMIN SERPL-MCNC: 4.6 G/DL (ref 3.7–4.7)
ALBUMIN/GLOB SERPL: 1.4 {RATIO} (ref 1.2–2.2)
ALP SERPL-CCNC: 93 IU/L (ref 44–121)
ALT SERPL-CCNC: 12 IU/L (ref 0–32)
AST SERPL-CCNC: 25 IU/L (ref 0–40)
BASOPHILS # BLD AUTO: 0 X10E3/UL (ref 0–0.2)
BASOPHILS NFR BLD AUTO: 0 %
BILIRUB SERPL-MCNC: 0.3 MG/DL (ref 0–1.2)
BUN SERPL-MCNC: 13 MG/DL (ref 8–27)
BUN/CREAT SERPL: 13 (ref 12–28)
CALCIUM SERPL-MCNC: 10.4 MG/DL (ref 8.7–10.3)
CHLORIDE SERPL-SCNC: 102 MMOL/L (ref 96–106)
CHOLEST SERPL-MCNC: 157 MG/DL (ref 100–199)
CO2 SERPL-SCNC: 23 MMOL/L (ref 20–29)
CREAT SERPL-MCNC: 1.01 MG/DL (ref 0.57–1)
EOSINOPHIL # BLD AUTO: 0.6 X10E3/UL (ref 0–0.4)
EOSINOPHIL NFR BLD AUTO: 9 %
ERYTHROCYTE [DISTWIDTH] IN BLOOD BY AUTOMATED COUNT: 13 % (ref 11.7–15.4)
GLOBULIN SER CALC-MCNC: 3.2 G/DL (ref 1.5–4.5)
GLUCOSE SERPL-MCNC: 88 MG/DL (ref 65–99)
HCT VFR BLD AUTO: 37.2 % (ref 34–46.6)
HDLC SERPL-MCNC: 47 MG/DL
HGB BLD-MCNC: 11.7 G/DL (ref 11.1–15.9)
IMM GRANULOCYTES # BLD AUTO: 0 X10E3/UL (ref 0–0.1)
IMM GRANULOCYTES NFR BLD AUTO: 0 %
IMP & REVIEW OF LAB RESULTS: NORMAL
INTERPRETATION: NORMAL
LDLC SERPL CALC-MCNC: 97 MG/DL (ref 0–99)
LYMPHOCYTES # BLD AUTO: 1.6 X10E3/UL (ref 0.7–3.1)
LYMPHOCYTES NFR BLD AUTO: 25 %
MCH RBC QN AUTO: 30.4 PG (ref 26.6–33)
MCHC RBC AUTO-ENTMCNC: 31.5 G/DL (ref 31.5–35.7)
MCV RBC AUTO: 97 FL (ref 79–97)
MONOCYTES # BLD AUTO: 0.4 X10E3/UL (ref 0.1–0.9)
MONOCYTES NFR BLD AUTO: 7 %
NEUTROPHILS # BLD AUTO: 3.8 X10E3/UL (ref 1.4–7)
NEUTROPHILS NFR BLD AUTO: 59 %
PLATELET # BLD AUTO: 177 X10E3/UL (ref 150–450)
POTASSIUM SERPL-SCNC: 6.1 MMOL/L (ref 3.5–5.2)
PROT SERPL-MCNC: 7.8 G/DL (ref 6–8.5)
RBC # BLD AUTO: 3.85 X10E6/UL (ref 3.77–5.28)
SODIUM SERPL-SCNC: 139 MMOL/L (ref 134–144)
TRIGL SERPL-MCNC: 65 MG/DL (ref 0–149)
TSH SERPL DL<=0.005 MIU/L-ACNC: 0.38 UIU/ML (ref 0.45–4.5)
VLDLC SERPL CALC-MCNC: 13 MG/DL (ref 5–40)
WBC # BLD AUTO: 6.4 X10E3/UL (ref 3.4–10.8)

## 2021-09-28 NOTE — PROGRESS NOTES
Please find out if she has a Kidney Specialist. Her potassium is juan carlos high. Has this been an issue before. Make sure she is not taking any otc potassium supplements. Needs to stop all  high potassium foods as well. Needs to come have redrawn on Thursday to see that is coming back down.  Cassie Maldonado

## 2021-09-28 NOTE — TELEPHONE ENCOUNTER
Patient stated she was supposed to be sent to pharmacy on file/Tramadol. She had a visit on 09.21.2021. Also, patient would like to discuss lab results.   Please call patient: [phone]

## 2021-09-28 NOTE — PROGRESS NOTES
Spoke with pt, she is aware of labs and verbalized understanding. Provider wants pt back on Thursday for re-draw.  Pt will call tomorrow to let us know if she can make it

## 2021-09-29 ENCOUNTER — TELEPHONE (OUTPATIENT)
Dept: FAMILY MEDICINE CLINIC | Age: 71
End: 2021-09-29

## 2021-09-29 DIAGNOSIS — N28.9 RENAL FUNCTION IMPAIRMENT: ICD-10-CM

## 2021-09-29 DIAGNOSIS — E87.5 HYPERKALEMIA: Primary | ICD-10-CM

## 2021-10-01 LAB
ALBUMIN SERPL-MCNC: 4.3 G/DL (ref 3.7–4.7)
ALBUMIN/GLOB SERPL: 1.3 {RATIO} (ref 1.2–2.2)
ALP SERPL-CCNC: 77 IU/L (ref 44–121)
ALT SERPL-CCNC: 14 IU/L (ref 0–32)
AST SERPL-CCNC: 25 IU/L (ref 0–40)
BILIRUB SERPL-MCNC: 0.3 MG/DL (ref 0–1.2)
BUN SERPL-MCNC: 24 MG/DL (ref 8–27)
BUN/CREAT SERPL: 17 (ref 12–28)
CALCIUM SERPL-MCNC: 9.5 MG/DL (ref 8.7–10.3)
CHLORIDE SERPL-SCNC: 106 MMOL/L (ref 96–106)
CO2 SERPL-SCNC: 24 MMOL/L (ref 20–29)
CREAT SERPL-MCNC: 1.42 MG/DL (ref 0.57–1)
GLOBULIN SER CALC-MCNC: 3.2 G/DL (ref 1.5–4.5)
GLUCOSE SERPL-MCNC: 97 MG/DL (ref 65–99)
INTERPRETATION: NORMAL
POTASSIUM SERPL-SCNC: 5.2 MMOL/L (ref 3.5–5.2)
PROT SERPL-MCNC: 7.5 G/DL (ref 6–8.5)
SODIUM SERPL-SCNC: 143 MMOL/L (ref 134–144)

## 2021-10-04 NOTE — TELEPHONE ENCOUNTER
Please let her know that her electrolytes are better for potassium but her kidney functions are slightly higher. We need to recheck this in 4 weeks. Make sure she is not taking a lot of nsaids that are harmful to the kidneys.  [facility]

## 2021-10-05 ENCOUNTER — TELEPHONE (OUTPATIENT)
Dept: FAMILY MEDICINE CLINIC | Age: 71
End: 2021-10-05

## 2021-10-05 DIAGNOSIS — E78.5 DYSLIPIDEMIA: ICD-10-CM

## 2021-10-05 DIAGNOSIS — I25.10 CORONARY ARTERY DISEASE INVOLVING NATIVE HEART WITHOUT ANGINA PECTORIS, UNSPECIFIED VESSEL OR LESION TYPE: ICD-10-CM

## 2021-10-05 DIAGNOSIS — I10 ESSENTIAL HYPERTENSION: ICD-10-CM

## 2021-10-05 RX ORDER — METOPROLOL TARTRATE 50 MG/1
50 TABLET ORAL 2 TIMES DAILY
Qty: 60 TABLET | Refills: 1 | Status: SHIPPED | OUTPATIENT
Start: 2021-10-05 | End: 2021-11-29

## 2021-11-16 ENCOUNTER — OFFICE VISIT (OUTPATIENT)
Dept: FAMILY MEDICINE CLINIC | Age: 71
End: 2021-11-16
Payer: MEDICARE

## 2021-11-16 VITALS
HEART RATE: 67 BPM | SYSTOLIC BLOOD PRESSURE: 123 MMHG | TEMPERATURE: 98.1 F | BODY MASS INDEX: 29.29 KG/M2 | HEIGHT: 65 IN | RESPIRATION RATE: 14 BRPM | WEIGHT: 175.8 LBS | OXYGEN SATURATION: 100 % | DIASTOLIC BLOOD PRESSURE: 66 MMHG

## 2021-11-16 DIAGNOSIS — N18.30 STAGE 3 CHRONIC KIDNEY DISEASE, UNSPECIFIED WHETHER STAGE 3A OR 3B CKD (HCC): ICD-10-CM

## 2021-11-16 DIAGNOSIS — E87.5 HYPERKALEMIA: ICD-10-CM

## 2021-11-16 DIAGNOSIS — Z23 NEEDS FLU SHOT: ICD-10-CM

## 2021-11-16 DIAGNOSIS — R94.6 ABNORMAL THYROID FUNCTION TEST: ICD-10-CM

## 2021-11-16 DIAGNOSIS — I10 ESSENTIAL HYPERTENSION: Primary | ICD-10-CM

## 2021-11-16 PROCEDURE — G8536 NO DOC ELDER MAL SCRN: HCPCS | Performed by: NURSE PRACTITIONER

## 2021-11-16 PROCEDURE — 1101F PT FALLS ASSESS-DOCD LE1/YR: CPT | Performed by: NURSE PRACTITIONER

## 2021-11-16 PROCEDURE — G8427 DOCREV CUR MEDS BY ELIG CLIN: HCPCS | Performed by: NURSE PRACTITIONER

## 2021-11-16 PROCEDURE — 90694 VACC AIIV4 NO PRSRV 0.5ML IM: CPT | Performed by: NURSE PRACTITIONER

## 2021-11-16 PROCEDURE — G9717 DOC PT DX DEP/BP F/U NT REQ: HCPCS | Performed by: NURSE PRACTITIONER

## 2021-11-16 PROCEDURE — G8399 PT W/DXA RESULTS DOCUMENT: HCPCS | Performed by: NURSE PRACTITIONER

## 2021-11-16 PROCEDURE — G8419 CALC BMI OUT NRM PARAM NOF/U: HCPCS | Performed by: NURSE PRACTITIONER

## 2021-11-16 PROCEDURE — G8754 DIAS BP LESS 90: HCPCS | Performed by: NURSE PRACTITIONER

## 2021-11-16 PROCEDURE — 3017F COLORECTAL CA SCREEN DOC REV: CPT | Performed by: NURSE PRACTITIONER

## 2021-11-16 PROCEDURE — 99214 OFFICE O/P EST MOD 30 MIN: CPT | Performed by: NURSE PRACTITIONER

## 2021-11-16 PROCEDURE — G8752 SYS BP LESS 140: HCPCS | Performed by: NURSE PRACTITIONER

## 2021-11-16 PROCEDURE — 1090F PRES/ABSN URINE INCON ASSESS: CPT | Performed by: NURSE PRACTITIONER

## 2021-11-16 PROCEDURE — G0008 ADMIN INFLUENZA VIRUS VAC: HCPCS | Performed by: NURSE PRACTITIONER

## 2021-11-16 PROCEDURE — G9899 SCRN MAM PERF RSLTS DOC: HCPCS | Performed by: NURSE PRACTITIONER

## 2021-11-16 NOTE — PATIENT INSTRUCTIONS
Vaccine Information Statement    Influenza (Flu) Vaccine (Inactivated or Recombinant): What You Need to Know    Many vaccine information statements are available in [place] and other languages. See www.immunize.org/vis. Hojas de información sobre vacunas están disponibles en español y en muchos otros idiomas. Visite www.immunize.org/vis. 1. Why get vaccinated? Influenza vaccine can prevent influenza (flu). Flu is a contagious disease that spreads around the [facility] every year, usually between October and May. Anyone can get the flu, but it is more dangerous for some people. Infants and young children, people 72 years and older, pregnant people, and people with certain health conditions or a weakened immune system are at greatest risk of flu complications. Pneumonia, bronchitis, sinus infections, and ear infections are examples of flu-related complications. If you have a medical condition, such as heart disease, cancer, or diabetes, flu can make it worse. Flu can cause fever and chills, sore throat, muscle aches, fatigue, cough, headache, and runny or stuffy nose. Some people may have vomiting and diarrhea, though this is more common in children than adults. In an average year, thousands of people in the [facility] die from flu, and many more are hospitalized. Flu vaccine prevents millions of illnesses and flu-related visits to the doctor each year. 2. Influenza vaccines     CDC recommends everyone 6 months and older get vaccinated every flu season. Children 6 months through 6years of age may need 2 doses during a single flu season. Everyone else needs only 1 dose each flu season. It takes about 2 weeks for protection to develop after vaccination. There are many flu viruses, and they are always changing. Each year a new flu vaccine is made to protect against the influenza viruses believed to be likely to cause disease in the upcoming flu season.  Even when the vaccine doesnt exactly match these viruses, it may still provide some protection. Influenza vaccine does not cause flu. Influenza vaccine may be given at the same time as other vaccines. 3. Talk with your health care provider    Tell your vaccination provider if the person getting the vaccine:   Has had an allergic reaction after a previous dose of influenza vaccine, or has any severe, life-threatening allergies    Has ever had Guillain-Barré Syndrome (also called GBS)    In some cases, your health care provider may decide to postpone influenza vaccination until a future visit. Influenza vaccine can be administered at any time during pregnancy. People who are or will be pregnant during influenza season should receive inactivated influenza vaccine. People with minor illnesses, such as a cold, may be vaccinated. People who are moderately or severely ill should usually wait until they recover before getting influenza vaccine. Your health care provider can give you more information. 4. Risks of a vaccine reaction     Soreness, redness, and swelling where the shot is given, fever, muscle aches, and headache can happen after influenza vaccination.  There may be a very small increased risk of Guillain-Barré Syndrome (GBS) after inactivated influenza vaccine (the flu shot). [place] children who get the flu shot along with pneumococcal vaccine (PCV13) and/or DTaP vaccine at the same time might be slightly more likely to have a seizure caused by fever. Tell your health care provider if a child who is getting flu vaccine has ever had a seizure. People sometimes faint after medical procedures, including vaccination. Tell your provider if you feel dizzy or have vision changes or ringing in the ears. As with any medicine, there is a very remote chance of a vaccine causing a severe allergic reaction, other serious injury, or death. 5. What if there is a serious problem?     An allergic reaction could occur after the vaccinated person leaves the clinic. If you see signs of a severe allergic reaction (hives, swelling of the face and throat, difficulty breathing, a fast heartbeat, dizziness, or weakness), call 9-1-1 and get the person to the nearest hospital.    For other signs that concern you, call your health care provider. Adverse reactions should be reported to the Vaccine Adverse Event Reporting System (VAERS). Your health care provider will usually file this report, or you can do it yourself. Visit the VAERS website at www.vaers. [facility].gov or call 3-735.700.6684. VAERS is only for reporting reactions, and VAERS staff members do not give medical advice. 6. The National Vaccine Injury Compensation Program    The [facility] Vaccine Injury Compensation Program (VICP) is a federal program that was created to compensate people who may have been injured by certain vaccines. Claims regarding alleged injury or death due to vaccination have a time limit for filing, which may be as short as two years. Visit the VICP website at www.[facility].gov/vaccinecompensation or call 0-933.926.3978 to learn about the program and about filing a claim. 7. How can I learn more?  Ask your health care provider.  Call your local or state health department.  Visit the website of the Food and Drug Administration (FDA) for vaccine package inserts and additional information at www.fda.gov/vaccines-blood-biologics/vaccines.  Contact the Centers for Disease Control and Prevention (CDC):  - Call 6-124.980.8694 (1-800-CDC-INFO) or  - Visit CDCs influenza website at www.cdc.gov/flu. Vaccine Information Statement   Inactivated Influenza Vaccine   8/6/2021  [address] Even [id number]   Department of Health and Human Services  Centers for Disease Control and Prevention    Office Use Only

## 2021-11-17 LAB
ALBUMIN SERPL-MCNC: 4.3 G/DL (ref 3.7–4.7)
ALBUMIN/GLOB SERPL: 1.6 {RATIO} (ref 1.2–2.2)
ALP SERPL-CCNC: 85 IU/L (ref 44–121)
ALT SERPL-CCNC: 12 IU/L (ref 0–32)
AST SERPL-CCNC: 26 IU/L (ref 0–40)
BILIRUB SERPL-MCNC: 0.2 MG/DL (ref 0–1.2)
BUN SERPL-MCNC: 21 MG/DL (ref 8–27)
BUN/CREAT SERPL: 18 (ref 12–28)
CALCIUM SERPL-MCNC: 9.6 MG/DL (ref 8.7–10.3)
CHLORIDE SERPL-SCNC: 107 MMOL/L (ref 96–106)
CO2 SERPL-SCNC: 21 MMOL/L (ref 20–29)
CREAT SERPL-MCNC: 1.19 MG/DL (ref 0.57–1)
GLOBULIN SER CALC-MCNC: 2.7 G/DL (ref 1.5–4.5)
GLUCOSE SERPL-MCNC: 77 MG/DL (ref 65–99)
INTERPRETATION: NORMAL
POTASSIUM SERPL-SCNC: 5.5 MMOL/L (ref 3.5–5.2)
PROT SERPL-MCNC: 7 G/DL (ref 6–8.5)
SODIUM SERPL-SCNC: 141 MMOL/L (ref 134–144)
TSH SERPL DL<=0.005 MIU/L-ACNC: 1.05 UIU/ML (ref 0.45–4.5)

## 2021-11-20 NOTE — PROGRESS NOTES
Subjective:     Noble Vera is a 66 y.o. female who presents for follow up of hypertension, CKD, hyperkalemia, and low TSH. Diet and Lifestyle: generally follows a low fat low cholesterol diet, generally follows a low sodium diet, sedentary, smoker current every day  Home BP Monitoring: is not measured at home    Cardiovascular ROS: taking medications as instructed, no medication side effects noted, no TIA's, no chest pain on exertion, no dyspnea on exertion, no swelling of ankles, no orthostatic dizziness or lightheadedness, no orthopnea or paroxysmal nocturnal dyspnea, no palpitations, no intermittent claudication symptoms. New concerns: due for repeat of potassium and TSH, K high and TSH low on last check. Patient Active Problem List   Diagnosis Code    Chronic hepatitis C ([facility] B18.2    Osteopenia M85.80    Closed fracture of right patella S82.001A    HSV-2 (herpes simplex virus 2) infection B00.9    Abnormal EKG R94.31    Chest pain R07.9    Essential hypertension I10    Dyslipidemia E78.5    Elevated liver enzymes R74.8    Peripheral vascular disease (HCC) I73.9    Coronary artery disease of native artery of native heart with stable angina pectoris ([facility] I25.118    Depression F32. A    Major depressive disorder, recurrent, moderate F33.1     Allergies   Allergen Reactions    Pcn [Penicillins] Unknown (comments)     Past Medical History:   Diagnosis Date    Arthritis     Asthma     in 25s - no longer a problem    CAD (coronary artery disease)     NSTEMI with PCI (LETICIA)  to RCA 5/12/17     Depression     Dyslipidemia     H/O heart artery stent     x1    Hypertension     Ill-defined condition     Hepatitis C    Knee injury     Liver disease     hep c--had treatment, states no longer present    Nausea & vomiting     Seizures (HCC)     Smoker      Past Surgical History:   Procedure Laterality Date    COLONOSCOPY N/A 1/14/2019    COLONOSCOPY performed by Karina Busch MD at [facility] HX COLONOSCOPY  2013    HX CORONARY STENT PLACEMENT      x1    HX ORTHOPAEDIC Bilateral     foot surgery    VASCULAR SURGERY PROCEDURE UNLIST      left leg varicose vein stripping     Family History   Problem Relation Age of Onset    Elevated Lipids Mother     Cancer Father         LUNG AND THROAT    Parkinson's Disease Father     Breast Cancer Paternal Aunt      Social History     Tobacco Use    Smoking status: Current Every Day Smoker     Packs/day: 0.10    Smokeless tobacco: Never Used    Tobacco comment: smokes 3-4 cigarettes per day x 25 years   Substance Use Topics    Alcohol use: Yes     Comment: about 5 drinks per week        Lab Results   Component Value Date/Time    WBC 6.4 09/21/2021 12:00 AM    HGB 11.7 09/21/2021 12:00 AM    HCT 37.2 09/21/2021 12:00 AM    PLATELET 890 75/35/6920 12:00 AM    MCV 97 09/21/2021 12:00 AM     Lab Results   Component Value Date/Time    Hemoglobin A1c 5.9 (H) 07/06/2021 07:54 AM    Hemoglobin A1c 5.8 (H) 10/24/2017 11:45 AM    Hemoglobin A1c 5.6 04/04/2017 07:47 AM    Glucose 77 11/16/2021 12:00 AM    LDL, calculated 97 09/21/2021 12:00 AM    LDL, calculated 89 06/25/2019 10:35 AM    Creatinine 1.19 (H) 11/16/2021 12:00 AM      Lab Results   Component Value Date/Time    Cholesterol, total 157 09/21/2021 12:00 AM    HDL Cholesterol 47 09/21/2021 12:00 AM    LDL, calculated 97 09/21/2021 12:00 AM    LDL, calculated 89 06/25/2019 10:35 AM    Triglyceride 65 09/21/2021 12:00 AM    CHOL/HDL Ratio 3.3 01/18/2010 11:44 AM     Lab Results   Component Value Date/Time    ALT (SGPT) 12 11/16/2021 12:00 AM    Alk.  phosphatase 85 11/16/2021 12:00 AM    Bilirubin, direct 0.10 01/19/2016 12:00 AM    Bilirubin, total 0.2 11/16/2021 12:00 AM    Albumin 4.3 11/16/2021 12:00 AM    Protein, total 7.0 11/16/2021 12:00 AM    PLATELET 140 84/15/4250 12:00 AM    Hepatitis C Ab High Pos (A) 01/18/2010 11:44 AM    Hep B surface Ag Negative 01/18/2010 11:44 AM Lab Results   Component Value Date/Time    GFR est non-AA 46 (L) 11/16/2021 12:00 AM    GFR est AA 53 (L) 11/16/2021 12:00 AM    Creatinine 1.19 (H) 11/16/2021 12:00 AM    BUN 21 11/16/2021 12:00 AM    Sodium 141 11/16/2021 12:00 AM    Potassium 5.5 (H) 11/16/2021 12:00 AM    Chloride 107 (H) 11/16/2021 12:00 AM    CO2 21 11/16/2021 12:00 AM     Lab Results   Component Value Date/Time    TSH 1.050 11/16/2021 12:00 AM         Review of Systems, additional:  A comprehensive review of systems was negative except for that written in the HPI.     Objective:     Visit Vitals  /66 (BP 1 Location: Left upper arm, BP Patient Position: Sitting, BP Cuff Size: Small adult)   Pulse 67   Temp 98.1 °F (36.7 °C) (Oral)   Resp 14   Ht 5' 5\" (1.651 m)   Wt 175 lb 12.8 oz (79.7 kg)   SpO2 100%   BMI 29.25 kg/m²     Physical Examination: General appearance - alert, well appearing, and in no distress and oriented to person, place, and time  Mental status - normal mood, behavior, speech, dress, motor activity, and thought processes  Eyes - sclera anicteric  Neck - supple, no significant adenopathy, thyroid exam: thyroid is normal in size without nodules or tenderness  Chest - clear to auscultation, no wheezes, rales or rhonchi, symmetric air entry  Heart - normal rate, regular rhythm, normal S1, S2, no murmurs, rubs, clicks or gallops, normal bilateral carotid upstroke without bruits, no JVD  Abdomen - soft, nontender, nondistended, no masses or organomegaly  bowel sounds normal  Neurological - alert, oriented, normal speech, no focal findings or movement disorder noted  Extremities - no pedal edema noted, intact peripheral pulses, no edema, redness or tenderness in the calves or thighs  Skin - normal coloration and turgor, no rashes      Assessment/Plan:       reviewed diet, exercise and weight control  very strongly urged to quit smoking to reduce cardiovascular risk  copy of written low fat low cholesterol diet provided and reviewed  cardiovascular risk and specific lipid/LDL goals reviewed  reviewed medications and side effects in detail  reviewed potential future medication changes and side effects. Diagnoses and all orders for this visit:    1. Essential hypertension  at goal  Continue metoporlol, olmesartan, diltiazem  Low sodium diet  Weight loss  150 minutes of moderate intensity exercise weekly    2. Stage 3 chronic kidney disease, unspecified whether stage 3a or 3b CKD (HCC)  No nsaids  Hydrate  Maintain BP control  -     METABOLIC PANEL, COMPREHENSIVE; Future    3. Hyperkalemia  Repeat test  -     METABOLIC PANEL, COMPREHENSIVE; Future    4. Abnormal thyroid function test  Repeat test  -     TSH 3RD GENERATION; Future    5. Needs flu shot  -     FLU (FLUAD QUAD INFLUENZA VACCINE,QUAD,ADJUVANTED)    Other orders  -     METABOLIC PANEL, COMPREHENSIVE  -     CKD REPORT  -     TSH 3RD GENERATION      Follow-up and Dispositions    · Return in about 3 months (around 2/16/2022), or if symptoms worsen or fail to improve. I have discussed the diagnosis with the patient and the intended plan as seen in the above orders. The patient has received an after-visit summary and questions were answered concerning future plans. Patient conveyed understanding of the plan at the time of the visit.     Mariana Handley NP

## 2021-11-22 NOTE — PROGRESS NOTES
Spoke to pt. Patient x2 id verified. Informed results, pt stated that she will call us back to schedule 4 weeks f/up appointment.

## 2021-11-22 NOTE — PROGRESS NOTES
Kidney function has improved. Recommend avoiding nsaids (ibuprofen, naproxen), maintain good control of BP. Potassium is slightly elevated, though it has been higher in the past. Recommend repeating this test in 4 weeks.     Thyroid level is normal.

## 2021-11-29 DIAGNOSIS — I25.10 CORONARY ARTERY DISEASE INVOLVING NATIVE HEART WITHOUT ANGINA PECTORIS, UNSPECIFIED VESSEL OR LESION TYPE: ICD-10-CM

## 2021-11-29 DIAGNOSIS — E78.5 DYSLIPIDEMIA: ICD-10-CM

## 2021-11-29 DIAGNOSIS — I10 ESSENTIAL HYPERTENSION: ICD-10-CM

## 2021-11-29 RX ORDER — METOPROLOL TARTRATE 50 MG/1
TABLET ORAL
Qty: 120 TABLET | Refills: 1 | Status: SHIPPED | OUTPATIENT
Start: 2021-11-29

## 2022-01-05 ENCOUNTER — TELEPHONE (OUTPATIENT)
Dept: FAMILY MEDICINE CLINIC | Age: 72
End: 2022-01-05

## 2022-01-05 NOTE — TELEPHONE ENCOUNTER
----- Message from Girish Alfredo sent at 1/5/2022  9:11 AM EST -----  Subject: Message to Provider    QUESTIONS  Information for Provider? Patient called for referrals per [facility] said she   needed a hearing test, dermatologist, and a bone density test. And wasn't   sure if she needed an appointment to get the referrals or if Dr. Renée Hastings   would give them  ---------------------------------------------------------------------------  --------------  [facility]  What is the best way for the office to contact you? OK to leave message on   voicemail  Preferred Call Back Phone Number? [phone]  ---------------------------------------------------------------------------  --------------  SCRIPT ANSWERS  Relationship to Patient?  Self

## 2022-01-05 NOTE — TELEPHONE ENCOUNTER
Visit is due for labs in Feb, none of these are emergent, can do at next rountine follow up visit.  Arleth Adam

## 2022-02-14 ENCOUNTER — OFFICE VISIT (OUTPATIENT)
Dept: FAMILY MEDICINE CLINIC | Age: 72
End: 2022-02-14
Payer: MEDICARE

## 2022-02-14 VITALS
RESPIRATION RATE: 18 BRPM | BODY MASS INDEX: 29.82 KG/M2 | WEIGHT: 179 LBS | TEMPERATURE: 98.7 F | SYSTOLIC BLOOD PRESSURE: 131 MMHG | DIASTOLIC BLOOD PRESSURE: 74 MMHG | OXYGEN SATURATION: 98 % | HEART RATE: 61 BPM | HEIGHT: 65 IN

## 2022-02-14 DIAGNOSIS — N18.30 STAGE 3 CHRONIC KIDNEY DISEASE, UNSPECIFIED WHETHER STAGE 3A OR 3B CKD (HCC): Primary | ICD-10-CM

## 2022-02-14 DIAGNOSIS — I73.9 PERIPHERAL VASCULAR DISEASE (HCC): ICD-10-CM

## 2022-02-14 DIAGNOSIS — I25.118 CORONARY ARTERY DISEASE OF NATIVE ARTERY OF NATIVE HEART WITH STABLE ANGINA PECTORIS (HCC): ICD-10-CM

## 2022-02-14 DIAGNOSIS — B18.2 CHRONIC HEPATITIS C WITHOUT HEPATIC COMA (HCC): ICD-10-CM

## 2022-02-14 DIAGNOSIS — R23.9 UNSPECIFIED SKIN CHANGES: ICD-10-CM

## 2022-02-14 DIAGNOSIS — J44.9 CHRONIC OBSTRUCTIVE PULMONARY DISEASE, UNSPECIFIED COPD TYPE (HCC): ICD-10-CM

## 2022-02-14 DIAGNOSIS — F33.1 MAJOR DEPRESSIVE DISORDER, RECURRENT, MODERATE (HCC): ICD-10-CM

## 2022-02-14 PROCEDURE — G9899 SCRN MAM PERF RSLTS DOC: HCPCS | Performed by: NURSE PRACTITIONER

## 2022-02-14 PROCEDURE — G8399 PT W/DXA RESULTS DOCUMENT: HCPCS | Performed by: NURSE PRACTITIONER

## 2022-02-14 PROCEDURE — G8427 DOCREV CUR MEDS BY ELIG CLIN: HCPCS | Performed by: NURSE PRACTITIONER

## 2022-02-14 PROCEDURE — G8536 NO DOC ELDER MAL SCRN: HCPCS | Performed by: NURSE PRACTITIONER

## 2022-02-14 PROCEDURE — 1101F PT FALLS ASSESS-DOCD LE1/YR: CPT | Performed by: NURSE PRACTITIONER

## 2022-02-14 PROCEDURE — G8752 SYS BP LESS 140: HCPCS | Performed by: NURSE PRACTITIONER

## 2022-02-14 PROCEDURE — G9717 DOC PT DX DEP/BP F/U NT REQ: HCPCS | Performed by: NURSE PRACTITIONER

## 2022-02-14 PROCEDURE — G8419 CALC BMI OUT NRM PARAM NOF/U: HCPCS | Performed by: NURSE PRACTITIONER

## 2022-02-14 PROCEDURE — 3017F COLORECTAL CA SCREEN DOC REV: CPT | Performed by: NURSE PRACTITIONER

## 2022-02-14 PROCEDURE — 99213 OFFICE O/P EST LOW 20 MIN: CPT | Performed by: NURSE PRACTITIONER

## 2022-02-14 PROCEDURE — 1090F PRES/ABSN URINE INCON ASSESS: CPT | Performed by: NURSE PRACTITIONER

## 2022-02-14 PROCEDURE — G8754 DIAS BP LESS 90: HCPCS | Performed by: NURSE PRACTITIONER

## 2022-02-14 NOTE — PROGRESS NOTES
Chief Complaint   Patient presents with   [facility] Referral Request     for derm and neuro     Pt beign seen for labs  Pt wants to have a referral for dermotology and neurology   Pt states she has new knots coming in her head  Pt reports having a fall months ago    1. Have you been to the ER, urgent care clinic since your last visit? Hospitalized since your last visit? No    2. Have you seen or consulted any other health care providers outside of the [address] [place] [address] since your last visit? Include any pap smears or colon screening.  No     Pt has no other concerns

## 2022-02-14 NOTE — PROGRESS NOTES
HISTORY OF PRESENT ILLNESS  Rometta Lombard is a 66 y.o. female. HPI  Patient is here today for follow up htn and cholesterol  Needs referral to derm for skin changes of carola lower legs  Very dry and dark in color    ROS    Physical Exam  Vitals and nursing note reviewed. Constitutional:       Appearance: She is well-developed. Comments:      Neck:      Vascular: No JVD. Cardiovascular:      Rate and Rhythm: Normal rate and regular rhythm. Heart sounds: No murmur heard. No friction rub. No gallop. Pulmonary:      Effort: Pulmonary effort is normal. No respiratory distress. Breath sounds: Normal breath sounds. No wheezing. Skin:     General: Skin is warm. Neurological:      Mental Status: She is alert and oriented to person, place, and time. ASSESSMENT and PLAN  Diagnoses and all orders for this visit:    1. Stage 3 chronic kidney disease, unspecified whether stage 3a or 3b CKD ([facility]  Assessment & Plan:   well controlled, continue current medications    Orders:  -     METABOLIC PANEL, COMPREHENSIVE; Future    2. Unspecified skin changes  -     REFERRAL TO DERMATOLOGY    3. Chronic obstructive pulmonary disease, unspecified COPD type ([facility]  Assessment & Plan:   well controlled, continue current medications      4. Major depressive disorder, recurrent, moderate (HCC)  Assessment & Plan:   well controlled, continue current medications      5. Peripheral vascular disease ([facility]  Assessment & Plan:   monitored by specialist. No acute findings meriting change in the plan - Dr. Clari Linda      6. Chronic hepatitis C without hepatic coma (HCC)  Assessment & Plan:   well controlled, continue current medications, treatment is complete      7. Coronary artery disease of native artery of native heart with stable angina pectoris [facility])  Assessment & Plan:   monitored by specialist. No acute findings meriting change in the plan - Dr. Clari Linda    Orders:  -     CBC WITH AUTOMATED DIFF;  Future  - METABOLIC PANEL, COMPREHENSIVE; Future  -     LIPID PANEL; Future      Orders Placed This Encounter    CBC WITH AUTOMATED DIFF    METABOLIC PANEL, COMPREHENSIVE    LIPID PANEL    REFERRAL TO DERMATOLOGY     I have discussed the diagnosis with the patient and the intended plan as seen in the above orders. The patient has received an after-visit summary and questions were answered concerning future plans. Patient conveyed understanding of the plan at the time of the visit.     Olga Montgomery, MSN, ANP  2/14/2022

## 2022-02-15 LAB
ALBUMIN SERPL-MCNC: 4.3 G/DL (ref 3.7–4.7)
ALBUMIN/GLOB SERPL: 1.4 {RATIO} (ref 1.2–2.2)
ALP SERPL-CCNC: 84 IU/L (ref 44–121)
ALT SERPL-CCNC: 13 IU/L (ref 0–32)
AST SERPL-CCNC: 25 IU/L (ref 0–40)
BASOPHILS # BLD AUTO: 0 X10E3/UL (ref 0–0.2)
BASOPHILS NFR BLD AUTO: 1 %
BILIRUB SERPL-MCNC: 0.3 MG/DL (ref 0–1.2)
BUN SERPL-MCNC: 21 MG/DL (ref 8–27)
BUN/CREAT SERPL: 16 (ref 12–28)
CALCIUM SERPL-MCNC: 9.9 MG/DL (ref 8.7–10.3)
CHLORIDE SERPL-SCNC: 105 MMOL/L (ref 96–106)
CHOLEST SERPL-MCNC: 154 MG/DL (ref 100–199)
CO2 SERPL-SCNC: 24 MMOL/L (ref 20–29)
CREAT SERPL-MCNC: 1.34 MG/DL (ref 0.57–1)
EOSINOPHIL # BLD AUTO: 0.7 X10E3/UL (ref 0–0.4)
EOSINOPHIL NFR BLD AUTO: 11 %
ERYTHROCYTE [DISTWIDTH] IN BLOOD BY AUTOMATED COUNT: 13 % (ref 11.7–15.4)
GLOBULIN SER CALC-MCNC: 3 G/DL (ref 1.5–4.5)
GLUCOSE SERPL-MCNC: 90 MG/DL (ref 65–99)
HCT VFR BLD AUTO: 35.6 % (ref 34–46.6)
HDLC SERPL-MCNC: 47 MG/DL
HGB BLD-MCNC: 11.3 G/DL (ref 11.1–15.9)
IMM GRANULOCYTES # BLD AUTO: 0 X10E3/UL (ref 0–0.1)
IMM GRANULOCYTES NFR BLD AUTO: 0 %
IMP & REVIEW OF LAB RESULTS: NORMAL
INTERPRETATION: NORMAL
LDLC SERPL CALC-MCNC: 95 MG/DL (ref 0–99)
LYMPHOCYTES # BLD AUTO: 1.8 X10E3/UL (ref 0.7–3.1)
LYMPHOCYTES NFR BLD AUTO: 28 %
MCH RBC QN AUTO: 30.6 PG (ref 26.6–33)
MCHC RBC AUTO-ENTMCNC: 31.7 G/DL (ref 31.5–35.7)
MCV RBC AUTO: 97 FL (ref 79–97)
MONOCYTES # BLD AUTO: 0.5 X10E3/UL (ref 0.1–0.9)
MONOCYTES NFR BLD AUTO: 8 %
NEUTROPHILS # BLD AUTO: 3.4 X10E3/UL (ref 1.4–7)
NEUTROPHILS NFR BLD AUTO: 52 %
PLATELET # BLD AUTO: 145 X10E3/UL (ref 150–450)
POTASSIUM SERPL-SCNC: 5.7 MMOL/L (ref 3.5–5.2)
PROT SERPL-MCNC: 7.3 G/DL (ref 6–8.5)
RBC # BLD AUTO: 3.69 X10E6/UL (ref 3.77–5.28)
SODIUM SERPL-SCNC: 142 MMOL/L (ref 134–144)
TRIGL SERPL-MCNC: 59 MG/DL (ref 0–149)
VLDLC SERPL CALC-MCNC: 12 MG/DL (ref 5–40)
WBC # BLD AUTO: 6.5 X10E3/UL (ref 3.4–10.8)

## 2022-02-21 NOTE — PROGRESS NOTES
Please let her know that her potassium is high again, needs to restrict K foods. Increase water and recheck next week for a lab recheck.  Malou Edwards

## 2022-03-17 ENCOUNTER — TELEPHONE (OUTPATIENT)
Dept: FAMILY MEDICINE CLINIC | Age: 72
End: 2022-03-17

## 2022-03-17 RX ORDER — CEFDINIR 300 MG/1
300 CAPSULE ORAL 2 TIMES DAILY
Qty: 20 CAPSULE | Refills: 0 | Status: SHIPPED | OUTPATIENT
Start: 2022-03-17 | End: 2022-03-27

## 2022-03-17 NOTE — TELEPHONE ENCOUNTER
Patient requesting script for sinus infection. She has no fever but pain in nose and eyes. Pharmacy on file.  Patient's phone: [phone]

## 2022-03-17 NOTE — TELEPHONE ENCOUNTER
Spoke with pt, she states that she has tried zyrtec and Claritin and neither have helped.  She is stating that last time she needed an antibiotic sent in

## 2022-03-18 PROBLEM — R94.31 ABNORMAL EKG: Status: ACTIVE | Noted: 2017-05-01

## 2022-03-18 PROBLEM — N18.30 STAGE 3 CHRONIC KIDNEY DISEASE, UNSPECIFIED WHETHER STAGE 3A OR 3B CKD (HCC): Status: ACTIVE | Noted: 2022-02-14

## 2022-03-18 PROBLEM — R74.8 ELEVATED LIVER ENZYMES: Status: ACTIVE | Noted: 2018-04-30

## 2022-03-18 PROBLEM — R07.9 CHEST PAIN: Status: ACTIVE | Noted: 2017-05-01

## 2022-03-18 PROBLEM — F32.A DEPRESSION: Status: ACTIVE | Noted: 2021-09-21

## 2022-03-19 PROBLEM — J44.9 CHRONIC OBSTRUCTIVE PULMONARY DISEASE, UNSPECIFIED COPD TYPE (HCC): Status: ACTIVE | Noted: 2022-02-14

## 2022-03-19 PROBLEM — I25.118 CORONARY ARTERY DISEASE OF NATIVE ARTERY OF NATIVE HEART WITH STABLE ANGINA PECTORIS (HCC): Status: ACTIVE | Noted: 2020-08-03

## 2022-03-19 PROBLEM — I73.9 PERIPHERAL VASCULAR DISEASE (HCC): Status: ACTIVE | Noted: 2020-01-20

## 2022-03-19 PROBLEM — B00.9 HSV-2 (HERPES SIMPLEX VIRUS 2) INFECTION: Status: ACTIVE | Noted: 2017-01-31

## 2022-03-19 PROBLEM — F33.1 MAJOR DEPRESSIVE DISORDER, RECURRENT, MODERATE (HCC): Status: ACTIVE | Noted: 2021-09-21

## 2022-03-19 PROBLEM — I10 ESSENTIAL HYPERTENSION: Status: ACTIVE | Noted: 2017-05-01

## 2022-03-21 ENCOUNTER — OFFICE VISIT (OUTPATIENT)
Dept: FAMILY MEDICINE CLINIC | Age: 72
End: 2022-03-21
Payer: MEDICARE

## 2022-03-21 VITALS
HEIGHT: 65 IN | OXYGEN SATURATION: 98 % | BODY MASS INDEX: 30.49 KG/M2 | TEMPERATURE: 98.3 F | SYSTOLIC BLOOD PRESSURE: 129 MMHG | HEART RATE: 61 BPM | RESPIRATION RATE: 20 BRPM | WEIGHT: 183 LBS | DIASTOLIC BLOOD PRESSURE: 70 MMHG

## 2022-03-21 DIAGNOSIS — E87.5 HYPERKALEMIA: Primary | ICD-10-CM

## 2022-03-21 PROCEDURE — G8399 PT W/DXA RESULTS DOCUMENT: HCPCS | Performed by: NURSE PRACTITIONER

## 2022-03-21 PROCEDURE — G9899 SCRN MAM PERF RSLTS DOC: HCPCS | Performed by: NURSE PRACTITIONER

## 2022-03-21 PROCEDURE — G8752 SYS BP LESS 140: HCPCS | Performed by: NURSE PRACTITIONER

## 2022-03-21 PROCEDURE — 1090F PRES/ABSN URINE INCON ASSESS: CPT | Performed by: NURSE PRACTITIONER

## 2022-03-21 PROCEDURE — G8417 CALC BMI ABV UP PARAM F/U: HCPCS | Performed by: NURSE PRACTITIONER

## 2022-03-21 PROCEDURE — G8536 NO DOC ELDER MAL SCRN: HCPCS | Performed by: NURSE PRACTITIONER

## 2022-03-21 PROCEDURE — 3017F COLORECTAL CA SCREEN DOC REV: CPT | Performed by: NURSE PRACTITIONER

## 2022-03-21 PROCEDURE — G9717 DOC PT DX DEP/BP F/U NT REQ: HCPCS | Performed by: NURSE PRACTITIONER

## 2022-03-21 PROCEDURE — 99213 OFFICE O/P EST LOW 20 MIN: CPT | Performed by: NURSE PRACTITIONER

## 2022-03-21 PROCEDURE — G8754 DIAS BP LESS 90: HCPCS | Performed by: NURSE PRACTITIONER

## 2022-03-21 PROCEDURE — G8427 DOCREV CUR MEDS BY ELIG CLIN: HCPCS | Performed by: NURSE PRACTITIONER

## 2022-03-21 PROCEDURE — 1101F PT FALLS ASSESS-DOCD LE1/YR: CPT | Performed by: NURSE PRACTITIONER

## 2022-03-21 NOTE — PROGRESS NOTES
Chief Complaint   Patient presents with    Follow-up    Labs       Pt being seen for fuv  -labs    1. Have you been to the ER, urgent care clinic since your last visit? Hospitalized since your last visit? No    2. Have you seen or consulted any other health care providers outside of the [address] [place] [address] since your last visit? Include any pap smears or colon screening.  No     Pt has no other concerns

## 2022-03-21 NOTE — PROGRESS NOTES
HISTORY OF PRESENT ILLNESS  Kurt Mccain is a 66 y.o. female. HPI  She is here for follow up high K  Admits poor water intake as well  Does not have pmh kidney disease    ROS  A comprehensive review of system was obtained and negative except findings in the HPI    Visit Vitals  /70 (BP 1 Location: Right arm, BP Patient Position: Sitting)   Pulse 61   Temp 98.3 °F (36.8 °C) (Oral)   Resp 20   Ht 5' 5\" (1.651 m)   Wt 183 lb (83 kg)   SpO2 98%   BMI 30.45 kg/m²     Physical Exam  Vitals and nursing note reviewed. Constitutional:       Appearance: She is well-developed. Comments:      Neck:      Vascular: No JVD. Cardiovascular:      Rate and Rhythm: Normal rate and regular rhythm. Heart sounds: No murmur heard. No friction rub. No gallop. Pulmonary:      Effort: Pulmonary effort is normal. No respiratory distress. Breath sounds: Normal breath sounds. No wheezing. Skin:     General: Skin is warm. Neurological:      Mental Status: She is alert and oriented to person, place, and time. ASSESSMENT and PLAN  Encounter Diagnoses   Name Primary?  Hyperkalemia Yes     Orders Placed This Encounter    METABOLIC PANEL, COMPREHENSIVE     Labs reordered  Encouraged enough hydration  I have discussed the diagnosis with the patient and the intended plan as seen in the above orders. The patient has received an after-visit summary and questions were answered concerning future plans. Patient conveyed understanding of the plan at the time of the visit.     Rupali Mendoza, MSN, ANP  3/21/2022

## 2022-03-22 LAB
ALBUMIN SERPL-MCNC: 3.8 G/DL (ref 3.5–5)
ALBUMIN/GLOB SERPL: 1 {RATIO} (ref 1.1–2.2)
ALP SERPL-CCNC: 84 U/L (ref 45–117)
ALT SERPL-CCNC: 26 U/L (ref 12–78)
ANION GAP SERPL CALC-SCNC: 3 MMOL/L (ref 5–15)
AST SERPL-CCNC: 33 U/L (ref 15–37)
BILIRUB SERPL-MCNC: 0.3 MG/DL (ref 0.2–1)
BUN SERPL-MCNC: 30 MG/DL (ref 6–20)
BUN/CREAT SERPL: 20 (ref 12–20)
CALCIUM SERPL-MCNC: 9.3 MG/DL (ref 8.5–10.1)
CHLORIDE SERPL-SCNC: 108 MMOL/L (ref 97–108)
CO2 SERPL-SCNC: 26 MMOL/L (ref 21–32)
CREAT SERPL-MCNC: 1.49 MG/DL (ref 0.55–1.02)
GLOBULIN SER CALC-MCNC: 3.8 G/DL (ref 2–4)
GLUCOSE SERPL-MCNC: 91 MG/DL (ref 65–100)
POTASSIUM SERPL-SCNC: 5.4 MMOL/L (ref 3.5–5.1)
PROT SERPL-MCNC: 7.6 G/DL (ref 6.4–8.2)
SODIUM SERPL-SCNC: 137 MMOL/L (ref 136–145)

## 2022-03-27 NOTE — PROGRESS NOTES
Please let her know that her potassium is still high. She needs to eat a very low K diet. She also needs to see a kidney specialist. Has she seen one before? The potassium is starting to affect her kidney functions, not critical but going up. Want to catch it before it becomes a problem.  Love Walters

## 2022-03-28 ENCOUNTER — TELEPHONE (OUTPATIENT)
Dept: FAMILY MEDICINE CLINIC | Age: 72
End: 2022-03-28

## 2022-03-28 DIAGNOSIS — E87.5 HYPERKALEMIA: Primary | ICD-10-CM

## 2022-03-28 NOTE — TELEPHONE ENCOUNTER
----- Message from Manuelito Gaspar NP sent at 3/27/2022  6:19 PM EDT -----  Please let her know that her potassium is still high. She needs to eat a very low K diet. She also needs to see a kidney specialist. Has she seen one before? The potassium is starting to affect her kidney functions, not critical but going up. Want to catch it before it becomes a problem.  Marisol Arteaga

## 2022-03-28 NOTE — TELEPHONE ENCOUNTER
LVM requesting Ms. Carmona Point to contact the office back at her earliest convenience to advise of Elizabeth Malone NP's result note and/or recommendations.

## 2022-03-29 NOTE — PROGRESS NOTES
Spoke with pt she is aware and verbalized understanding.  She has also been given info for a kidney dr

## 2022-04-05 NOTE — TELEPHONE ENCOUNTER
----- Message from Gaye Malin sent at 4/5/2022  9:58 AM EDT -----  Subject: Referral Request    QUESTIONS   Reason for referral request? Patient states she needs a referral for liver   and kidney doctor and they will need her chart sent to them as well. [facility]. Please advise. Thanks   Has the physician seen you for this condition before? No   Preferred Specialist (if applicable)? Do you already have an appointment scheduled? No  Additional Information for Provider?   ---------------------------------------------------------------------------  --------------  CALL BACK INFO  What is the best way for the office to contact you? OK to leave message on   voicemail  Preferred Call Back Phone Number? [phone]  ---------------------------------------------------------------------------  --------------  SCRIPT ANSWERS  Relationship to Patient?  Self

## 2022-04-05 NOTE — TELEPHONE ENCOUNTER
Returned call to pt - verified self and birth date. Pt states that both offices she was referred to is requesting referral and recent office notes. Pt would like referral and office note to be sent to both offices so she can get in whichever office has the first available appointment. Referral generated for both practices and office visits sent. Referrals generated for both practices, per provider request, and office note with labs faxed to both locations.

## 2022-04-11 ENCOUNTER — ANCILLARY PROCEDURE (OUTPATIENT)
Dept: CARDIOLOGY CLINIC | Age: 72
End: 2022-04-11
Payer: MEDICARE

## 2022-04-11 VITALS
HEIGHT: 65 IN | DIASTOLIC BLOOD PRESSURE: 72 MMHG | SYSTOLIC BLOOD PRESSURE: 150 MMHG | WEIGHT: 182 LBS | BODY MASS INDEX: 30.32 KG/M2

## 2022-04-11 DIAGNOSIS — E78.5 DYSLIPIDEMIA: ICD-10-CM

## 2022-04-11 DIAGNOSIS — R09.89 CAROTID BRUIT, UNSPECIFIED LATERALITY: ICD-10-CM

## 2022-04-11 LAB
LEFT CCA DIST DIAS: 18.8 CM/S
LEFT CCA DIST SYS: 84.1 CM/S
LEFT CCA PROX DIAS: 15.7 CM/S
LEFT CCA PROX SYS: 67 CM/S
LEFT ECA DIAS: 6.16 CM/S
LEFT ECA SYS: 93.1 CM/S
LEFT ICA DIST DIAS: 30.8 CM/S
LEFT ICA DIST SYS: 94.8 CM/S
LEFT ICA MID DIAS: 45.4 CM/S
LEFT ICA MID SYS: 154.1 CM/S
LEFT ICA PROX DIAS: 33.1 CM/S
LEFT ICA PROX SYS: 110 CM/S
LEFT ICA/CCA SYS: 1.83 NO UNITS
LEFT VERTEBRAL DIAS: 18.49 CM/S
LEFT VERTEBRAL SYS: 76 CM/S
RIGHT CCA DIST DIAS: 18.1 CM/S
RIGHT CCA DIST SYS: 80.2 CM/S
RIGHT CCA PROX DIAS: 13.5 CM/S
RIGHT CCA PROX SYS: 97.5 CM/S
RIGHT ECA DIAS: 5.6 CM/S
RIGHT ECA SYS: 76 CM/S
RIGHT ICA DIST DIAS: 34.8 CM/S
RIGHT ICA DIST SYS: 117.7 CM/S
RIGHT ICA MID DIAS: 31.5 CM/S
RIGHT ICA MID SYS: 118.3 CM/S
RIGHT ICA PROX DIAS: 35.5 CM/S
RIGHT ICA PROX SYS: 165.5 CM/S
RIGHT ICA/CCA SYS: 1.7 NO UNITS
RIGHT VERTEBRAL DIAS: 4.93 CM/S
RIGHT VERTEBRAL SYS: 29.6 CM/S

## 2022-04-11 PROCEDURE — 93880 EXTRACRANIAL BILAT STUDY: CPT | Performed by: SPECIALIST

## 2022-04-12 NOTE — PROGRESS NOTES
Dear Ms. Alvaro Devine,  Your test results are stable. Please let me know if you have any questions.    Kenan Hensley,  Dr. Macario Alu

## 2022-04-28 ENCOUNTER — DOCUMENTATION ONLY (OUTPATIENT)
Dept: FAMILY MEDICINE CLINIC | Age: 72
End: 2022-04-28

## 2022-07-06 RX ORDER — BUPROPION HYDROCHLORIDE 150 MG/1
TABLET, EXTENDED RELEASE ORAL
Qty: 180 TABLET | Refills: 1 | Status: SHIPPED | OUTPATIENT
Start: 2022-07-06 | End: 2022-10-24

## 2022-07-07 RX ORDER — OLMESARTAN MEDOXOMIL 20 MG/1
20 TABLET ORAL DAILY
Qty: 90 TABLET | Refills: 0 | Status: SHIPPED | OUTPATIENT
Start: 2022-07-07

## 2022-07-07 NOTE — TELEPHONE ENCOUNTER
Refill per VO of Dr. Jair Leal  Last appt: 6/21/2021  NOV needed x6 mos    Requested Prescriptions     Signed Prescriptions Disp Refills    olmesartan (BENICAR) 20 mg tablet 90 Tablet 0     Sig: Take 1 Tablet by mouth daily. Appointment needed for further refills.      Authorizing Provider: Mannie Barnes     Ordering User: Norberto Dejesus

## 2022-08-17 ENCOUNTER — TELEPHONE (OUTPATIENT)
Dept: FAMILY MEDICINE CLINIC | Age: 72
End: 2022-08-17

## 2022-08-17 NOTE — TELEPHONE ENCOUNTER
I spoke with patient as provider on call. States last 2 to 3 days she has been experiencing sinus congestion. She has taken zyrtec and flonase with no relief. She is requesting abx for sinus infection. Sx include nasal congestion, no cough, SOB, fever, chills, HA. Discussed with patient I recommend either trying mucinex for congestion relief, no sudafed with HTN, or going to urgent care tonight. Possible developing URI vs allergies vs possible COVID. She voiced understanding and plans to go to urgent care tonight.

## 2022-08-23 ENCOUNTER — TRANSCRIBE ORDER (OUTPATIENT)
Dept: SCHEDULING | Age: 72
End: 2022-08-23

## 2022-08-23 DIAGNOSIS — Z12.31 BREAST CANCER SCREENING BY MAMMOGRAM: Primary | ICD-10-CM

## 2022-09-06 ENCOUNTER — HOSPITAL ENCOUNTER (OUTPATIENT)
Dept: MAMMOGRAPHY | Age: 72
Discharge: HOME | End: 2022-09-06
Attending: NURSE PRACTITIONER
Payer: MEDICARE

## 2022-09-06 DIAGNOSIS — Z12.31 BREAST CANCER SCREENING BY MAMMOGRAM: ICD-10-CM

## 2022-09-06 PROCEDURE — 77067 SCR MAMMO BI INCL CAD: CPT

## 2022-10-24 ENCOUNTER — OFFICE VISIT (OUTPATIENT)
Dept: CARDIOLOGY CLINIC | Age: 72
End: 2022-10-24
Payer: MEDICARE

## 2022-10-24 VITALS
HEIGHT: 65 IN | WEIGHT: 179 LBS | DIASTOLIC BLOOD PRESSURE: 60 MMHG | HEART RATE: 71 BPM | SYSTOLIC BLOOD PRESSURE: 120 MMHG | OXYGEN SATURATION: 98 % | BODY MASS INDEX: 29.82 KG/M2

## 2022-10-24 DIAGNOSIS — I25.118 CORONARY ARTERY DISEASE OF NATIVE ARTERY OF NATIVE HEART WITH STABLE ANGINA PECTORIS (HCC): Primary | ICD-10-CM

## 2022-10-24 DIAGNOSIS — E78.5 DYSLIPIDEMIA: ICD-10-CM

## 2022-10-24 DIAGNOSIS — R94.31 ABNORMAL EKG: ICD-10-CM

## 2022-10-24 DIAGNOSIS — I10 ESSENTIAL HYPERTENSION: ICD-10-CM

## 2022-10-24 DIAGNOSIS — R06.09 DOE (DYSPNEA ON EXERTION): ICD-10-CM

## 2022-10-24 PROCEDURE — 1123F ACP DISCUSS/DSCN MKR DOCD: CPT | Performed by: SPECIALIST

## 2022-10-24 PROCEDURE — 99214 OFFICE O/P EST MOD 30 MIN: CPT | Performed by: SPECIALIST

## 2022-10-24 PROCEDURE — G9899 SCRN MAM PERF RSLTS DOC: HCPCS | Performed by: SPECIALIST

## 2022-10-24 PROCEDURE — G8752 SYS BP LESS 140: HCPCS | Performed by: SPECIALIST

## 2022-10-24 PROCEDURE — G8417 CALC BMI ABV UP PARAM F/U: HCPCS | Performed by: SPECIALIST

## 2022-10-24 PROCEDURE — G8536 NO DOC ELDER MAL SCRN: HCPCS | Performed by: SPECIALIST

## 2022-10-24 PROCEDURE — 93000 ELECTROCARDIOGRAM COMPLETE: CPT | Performed by: SPECIALIST

## 2022-10-24 PROCEDURE — G9717 DOC PT DX DEP/BP F/U NT REQ: HCPCS | Performed by: SPECIALIST

## 2022-10-24 PROCEDURE — 1090F PRES/ABSN URINE INCON ASSESS: CPT | Performed by: SPECIALIST

## 2022-10-24 PROCEDURE — 1101F PT FALLS ASSESS-DOCD LE1/YR: CPT | Performed by: SPECIALIST

## 2022-10-24 PROCEDURE — G8399 PT W/DXA RESULTS DOCUMENT: HCPCS | Performed by: SPECIALIST

## 2022-10-24 PROCEDURE — G8754 DIAS BP LESS 90: HCPCS | Performed by: SPECIALIST

## 2022-10-24 PROCEDURE — G8427 DOCREV CUR MEDS BY ELIG CLIN: HCPCS | Performed by: SPECIALIST

## 2022-10-24 PROCEDURE — 3017F COLORECTAL CA SCREEN DOC REV: CPT | Performed by: SPECIALIST

## 2022-10-24 RX ORDER — EZETIMIBE 10 MG/1
10 TABLET ORAL DAILY
Qty: 90 TABLET | Refills: 3 | Status: SHIPPED | OUTPATIENT
Start: 2022-10-24

## 2022-10-24 NOTE — PATIENT INSTRUCTIONS
DASH Diet: Care Instructions  Your Care Instructions     The DASH diet is an eating plan that can help lower your blood pressure. DASH stands for Dietary Approaches to Stop Hypertension. Hypertension is high blood pressure. The DASH diet focuses on eating foods that are high in calcium, potassium, and magnesium. These nutrients can lower blood pressure. The foods that are highest in these nutrients are fruits, vegetables, low-fat dairy products, nuts, seeds, and legumes. But taking calcium, potassium, and magnesium supplements instead of eating foods that are high in those nutrients does not have the same effect. The DASH diet also includes whole grains, fish, and poultry. The DASH diet is one of several lifestyle changes your doctor may recommend to lower your high blood pressure. Your doctor may also want you to decrease the amount of sodium in your diet. Lowering sodium while following the DASH diet can lower blood pressure even further than just the DASH diet alone. Follow-up care is a key part of your treatment and safety. Be sure to make and go to all appointments, and call your doctor if you are having problems. It's also a good idea to know your test results and keep a list of the medicines you take. How can you care for yourself at home? Following the DASH diet  Eat 4 to 5 servings of fruit each day. A serving is 1 medium-sized piece of fruit, ½ cup chopped or canned fruit, 1/4 cup dried fruit, or 4 ounces (½ cup) of fruit juice. Choose fruit more often than fruit juice. Eat 4 to 5 servings of vegetables each day. A serving is 1 cup of lettuce or raw leafy vegetables, ½ cup of chopped or cooked vegetables, or 4 ounces (½ cup) of vegetable juice. Choose vegetables more often than vegetable juice. Get 2 to 3 servings of low-fat and fat-free dairy each day. A serving is 8 ounces of milk, 1 cup of yogurt, or 1 ½ ounces of cheese. Eat 6 to 8 servings of grains each day.  A serving is 1 slice of bread, 1 ounce of dry cereal, or ½ cup of cooked rice, pasta, or cooked cereal. Try to choose whole-grain products as much as possible. Limit lean meat, poultry, and fish to 2 servings each day. A serving is 3 ounces, about the size of a deck of cards. Eat 4 to 5 servings of nuts, seeds, and legumes (cooked dried beans, lentils, and split peas) each week. A serving is 1/3 cup of nuts, 2 tablespoons of seeds, or ½ cup of cooked beans or peas. Limit fats and oils to 2 to 3 servings each day. A serving is 1 teaspoon of vegetable oil or 2 tablespoons of salad dressing. Limit sweets and added sugars to 5 servings or less a week. A serving is 1 tablespoon jelly or jam, ½ cup sorbet, or 1 cup of lemonade. Eat less than 2,300 milligrams (mg) of sodium a day. If you limit your sodium to 1,500 mg a day, you can lower your blood pressure even more. Be aware that all of these are the suggested number of servings for people who eat 1,800 to 2,000 calories a day. Your recommended number of servings may be different if you need more or fewer calories. Tips for success  Start small. Do not try to make dramatic changes to your diet all at once. You might feel that you are missing out on your favorite foods and then be more likely to not follow the plan. Make small changes, and stick with them. Once those changes become habit, add a few more changes. Try some of the following:  Make it a goal to eat a fruit or vegetable at every meal and at snacks. This will make it easy to get the recommended amount of fruits and vegetables each day. Try yogurt topped with fruit and nuts for a snack or healthy dessert. Add lettuce, tomato, cucumber, and onion to sandwiches. Combine a ready-made pizza crust with low-fat mozzarella cheese and lots of vegetable toppings. Try using tomatoes, squash, spinach, broccoli, carrots, cauliflower, and onions.   Have a variety of cut-up vegetables with a low-fat dip as an appetizer instead of chips and dip.  Sprinkle sunflower seeds or chopped almonds over salads. Or try adding chopped walnuts or almonds to cooked vegetables. Try some vegetarian meals using beans and peas. Add garbanzo or kidney beans to salads. Make burritos and tacos with mashed stover beans or black beans. Where can you learn more? Go to http://www.leyva.com/  Enter H967 in the search box to learn more about \"DASH Diet: Care Instructions. \"  Current as of: January 10, 2022               Content Version: 13.4  © 3427-3711 [employer]. Care instructions adapted under license by [employer] (which disclaims liability or warranty for this information). If you have questions about a medical condition or this instruction, always ask your healthcare professional. [address] any warranty or liability for your use of this information.

## 2022-10-24 NOTE — PROGRESS NOTES
Chief Complaint   Patient presents with    Follow-up     Annual     Cholesterol Problem    Coronary Artery Disease    Hypertension     Visit Vitals  /60 (BP 1 Location: Right upper arm, BP Patient Position: Sitting)   Pulse 71   Ht 5' 5\" (1.651 m)   Wt 179 lb (81.2 kg)   SpO2 98%   BMI 29.79 kg/m²     Chest pain denied   SOB denied   Palpitations denied   Swelling in hands/feet denied   Dizziness denied   Recent hospital stays denied   Refills denied

## 2022-10-24 NOTE — PROGRESS NOTES
Brett Allen MD. [facility]              Patient: Vika Lundy  : 1950      Today's Date: 10/24/2022          HISTORY OF PRESENT ILLNESS:     History of Present Illness:  Here for follow-up. She is doing OK overall. Says she has a big appetite and eats junk food. PAST MEDICAL HISTORY:     Past Medical History:   Diagnosis Date    Arthritis     Asthma     in 25s - no longer a problem    CAD (coronary artery disease)     NSTEMI with PCI (LETICIA)  to RCA 17     Depression     Dyslipidemia     H/O heart artery stent     x1    Hypertension     Ill-defined condition     Hepatitis C    Knee injury     Liver disease     hep c--had treatment, states no longer present    Nausea & vomiting     Seizures ([facility] [address]     Smoker          Past Surgical History:   Procedure Laterality Date    COLONOSCOPY N/A 2019    COLONOSCOPY performed by Martha Ba MD at [facility]    HX COLONOSCOPY      HX CORONARY STENT PLACEMENT      x1    HX ORTHOPAEDIC Bilateral     foot surgery    VASCULAR SURGERY PROCEDURE UNLIST  10-    left leg varicose vein stripping             CURRENT MEDICATIONS:    .  Current Outpatient Medications   Medication Sig Dispense Refill    linaCLOtide (Linzess) 145 mcg cap capsule TAKE 1 CAPSULE EVERY DAY BEFORE BREAKFAST 90 Capsule 1    olmesartan (BENICAR) 20 mg tablet Take 1 Tablet by mouth daily. Appointment needed for further refills. 90 Tablet 0    metoprolol tartrate (LOPRESSOR) 50 mg tablet TAKE 1 TABLET TWICE DAILY 120 Tablet 1    cholecalciferol, vitamin D3, 50 mcg (2,000 unit) tab Take  by mouth. atorvastatin (LIPITOR) 40 mg tablet TAKE 1 TABLET EVERY DAY AT 5PM 90 Tablet 3    aspirin 81 mg chewable tablet Take 1 Tab by mouth daily.  30 Tab 0           Allergies   Allergen Reactions    Pcn [Penicillins] Unknown (comments)           SOCIAL HISTORY:     Social History     Tobacco Use    Smoking status: Every Day     Packs/day: 0.10     Types: Cigarettes Smokeless tobacco: Never    Tobacco comments:     smokes 3-4 cigarettes per day x 25 years   Vaping Use    Vaping Use: Never used   Substance Use Topics    Alcohol use: Yes     Comment: about 5 drinks per week    Drug use: No           FAMILY HISTORY:     Family History   Problem Relation Age of Onset    Elevated Lipids Mother     Cancer Father         LUNG AND THROAT    Parkinson's Disease Father     Breast Cancer Paternal Aunt            REVIEW OF SYMPTOMS:     Review of Symptoms:  Constitutional: Negative for fever, chills  HEENT: Negative for nosebleeds, tinnitus, and vision changes. Respiratory: Negative for cough, wheezing  Cardiovascular: Negative for orthopnea, claudication, syncope, and PND. Gastrointestinal: Negative for abdominal pain, diarrhea, melena. Genitourinary: Negative for dysuria  Musculoskeletal: Negative for myalgias. Skin: Negative for rash  Heme: No problems bleeding. Neurological: Negative for speech change and focal weakness. PHYSICAL EXAM:     Physical Exam:  Visit Vitals  /60 (BP 1 Location: Right upper arm, BP Patient Position: Sitting)   Pulse 71   Ht 5' 5\" (1.651 m)   Wt 179 lb (81.2 kg)   SpO2 98%   BMI 29.79 kg/m²     Patient appears generally well, mood and affect are appropriate and pleasant. HEENT:  Hearing intact, non-icteric, normocephalic, atraumatic. Neck Exam: Supple, No JVD + left neck bruit   Lung Exam: Clear to auscultation, even breath sounds. Cardiac Exam: Regular rate and rhythm with no murmur or rub  Abdomen: Soft, non-tender, normal bowel sounds   Extremities: Moves all ext well.   MSKTL: Overall good ROM ext  Skin: No significant rashes  Psych: Appropriate affect  Neuro - Grossly intact        LABS / OTHER STUDIES reviewed:       Lab Results   Component Value Date/Time    Sodium 137 03/21/2022 03:50 PM    Potassium 5.4 (H) 03/21/2022 03:50 PM    Chloride 108 03/21/2022 03:50 PM    CO2 26 03/21/2022 03:50 PM    Anion gap 3 (L) 03/21/2022 03:50 PM    Glucose 91 03/21/2022 03:50 PM    BUN 30 (H) 03/21/2022 03:50 PM    Creatinine 1.49 (H) 03/21/2022 03:50 PM    BUN/Creatinine ratio 20 03/21/2022 03:50 PM    GFR est AA 42 (L) 03/21/2022 03:50 PM    GFR est non-AA 34 (L) 03/21/2022 03:50 PM    Calcium 9.3 03/21/2022 03:50 PM    Bilirubin, total 0.3 03/21/2022 03:50 PM    Alk. phosphatase 84 03/21/2022 03:50 PM    Protein, total 7.6 03/21/2022 03:50 PM    Albumin 3.8 03/21/2022 03:50 PM    Globulin 3.8 03/21/2022 03:50 PM    A-G Ratio 1.0 (L) 03/21/2022 03:50 PM    ALT (SGPT) 26 03/21/2022 03:50 PM    AST (SGOT) 33 03/21/2022 03:50 PM     Lab Results   Component Value Date/Time    WBC 6.5 02/14/2022 12:00 AM    HGB 11.3 02/14/2022 12:00 AM    HCT 35.6 02/14/2022 12:00 AM    PLATELET 840 (L) 43/46/5598 12:00 AM    MCV 97 02/14/2022 12:00 AM       Lab Results   Component Value Date/Time    Cholesterol, total 154 02/14/2022 12:00 AM    HDL Cholesterol 47 02/14/2022 12:00 AM    LDL, calculated 95 02/14/2022 12:00 AM    LDL, calculated 89 06/25/2019 10:35 AM    VLDL, calculated 12 02/14/2022 12:00 AM    VLDL, calculated 16 06/25/2019 10:35 AM    Triglyceride 59 02/14/2022 12:00 AM    CHOL/HDL Ratio 3.3 01/18/2010 11:44 AM           CARDIAC DIAGNOSTICS:     Cardiac Evaluation Includes:  I reviewed the results below. Echo 5/8/17 - LVEF 55-60%, grade 1 diastology  Exercise Cardiolite 5/8/17 - walked 7:31 (10.1 METS), ischemic EKG changes (2 mm inferior ST depression). + VALENTE but no CP. Small, mild, partially reversible defects in anteroseptal and inferior walls. SSS = 3, SRS = 1, SDS = 2.    Dilated LV with LVEF 37%  Carotid Dopplers 5/8/17 - mild 10-49% stenosis bilat      S/p right femoral thrombectomy and bovine patch plasty of right CFA/PFA 5/17/17 (s/p cath 5/12/17)      EKG 5/29/17 - NSR, LVH  Labs 5/29/17 - Hgb 10.9, LFT's OK     NSTEMI with PCI (LETICIA)  to RCA 5/12/17      Cath 5/12/17 - LM 20-30% narrowing, LAD calcified however good lumen, LCX calcified but OK lumen, RCA with 95% ostial stenosis and 100% proximal occlusion. Had PCI of RCA with LETICIA. LVEF 50-55% with inferobasal HK     Echo 10/5/20 - LVEF 62%, grade 1 diastology     Carotid Doppler 4/11/22 - < 50% stenosis bilat         EKG 4/4/17 - NSR, LVH  EKG 8/3/20 - sinus david, septal Q's   EKG 10/24/22 - NSR, septal Q waves              ASSESSMENT AND PLAN:     Assessment and Plan:    1)  CAD  - NSTEMI with PCI (LETICIA)  to RCA 5/12/17   - Cath 5/12/17 - LM 20-30% narrowing, LAD calcified however good lumen, LCX calcified but OK lumen, RCA with 95% ostial stenosis and 100% proximal occlusion. Had PCI of RCA with LETICIA. LVEF 50-55% with inferobasal HK  - she feels well and denies CP or sig SOB - class 1 VALENTE  - cont ASA, BB,  and statin. - smoking cessation advised again   - check an echo next visit       2) Carotid Bruit   - mild carotid disease 5/17   - Carotid Doppler 4/11/22 - < 50% stenosis bilat      3) HTN  - BP OK   - continue meds     4) Dyslipidemia  - cont statin   - Add Zetia to get LDL lower   - recheck labs in one month-- goal LDL < 70   - she admits to liking sweets and eating poorly --- talked about a healthier diet       5) Chronic Leg pain   - has seen Rheum     6) Depression and memory problems   - per PCP      6) See me in 6 months. 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 Meghna Flores MD, [address], [address]  [place] [address]  [address]  [address]  Ph: [phone]   Ph [phone]

## 2023-01-03 RX ORDER — ATORVASTATIN CALCIUM 40 MG/1
TABLET, FILM COATED ORAL
Qty: 90 TABLET | Refills: 1 | Status: SHIPPED | OUTPATIENT
Start: 2023-01-03

## 2023-01-14 DIAGNOSIS — E78.5 DYSLIPIDEMIA: ICD-10-CM

## 2023-01-14 DIAGNOSIS — I25.10 CORONARY ARTERY DISEASE INVOLVING NATIVE HEART WITHOUT ANGINA PECTORIS, UNSPECIFIED VESSEL OR LESION TYPE: ICD-10-CM

## 2023-01-14 DIAGNOSIS — I10 ESSENTIAL HYPERTENSION: ICD-10-CM

## 2023-01-16 RX ORDER — ATORVASTATIN CALCIUM 40 MG/1
TABLET, FILM COATED ORAL
Qty: 90 TABLET | Refills: 1 | Status: SHIPPED | OUTPATIENT
Start: 2023-01-16

## 2023-01-16 RX ORDER — METOPROLOL TARTRATE 50 MG/1
TABLET ORAL
Qty: 180 TABLET | Refills: 1 | Status: SHIPPED | OUTPATIENT
Start: 2023-01-16

## 2023-01-16 NOTE — TELEPHONE ENCOUNTER
Refill per VO of Dr. Viktoria Tee  Last appt: 10/24/2022  Future Appointments   Date Time Provider Humberto Root   1/30/2023  3:00 PM Nicolasa Shaw MD [facility] BS AMB   5/1/2023 10:00 AM BRIDGETT CORRAL BS AMB   5/1/2023 11:20 AM Nicolasa Shaw MD [facility] BS AMB       Requested Prescriptions     Signed Prescriptions Disp Refills    atorvastatin (LIPITOR) 40 mg tablet 90 Tablet 1     Sig: TAKE 1 TABLET EVERY DAY AT 5PM     Authorizing Provider: Bonnie Rubalcava     Ordering User: Ananth Parada

## 2023-01-28 LAB
ALBUMIN SERPL-MCNC: 4.7 G/DL (ref 3.7–4.7)
ALBUMIN/GLOB SERPL: 1.6 {RATIO} (ref 1.2–2.2)
ALP SERPL-CCNC: 82 IU/L (ref 44–121)
ALT SERPL-CCNC: 15 IU/L (ref 0–32)
AST SERPL-CCNC: 32 IU/L (ref 0–40)
BILIRUB SERPL-MCNC: 0.4 MG/DL (ref 0–1.2)
BUN SERPL-MCNC: 35 MG/DL (ref 8–27)
BUN/CREAT SERPL: 25 (ref 12–28)
CALCIUM SERPL-MCNC: 9.6 MG/DL (ref 8.7–10.3)
CHLORIDE SERPL-SCNC: 104 MMOL/L (ref 96–106)
CHOLEST SERPL-MCNC: 120 MG/DL (ref 100–199)
CO2 SERPL-SCNC: 22 MMOL/L (ref 20–29)
CREAT SERPL-MCNC: 1.4 MG/DL (ref 0.57–1)
EGFR: 40 ML/MIN/1.73
GLOBULIN SER CALC-MCNC: 2.9 G/DL (ref 1.5–4.5)
GLUCOSE SERPL-MCNC: 82 MG/DL (ref 70–99)
HDL SERPL-SCNC: 26.4 UMOL/L
HDLC SERPL-MCNC: 38 MG/DL
IMP & REVIEW OF LAB RESULTS: NORMAL
INTERPRETATION: NORMAL
LDL SERPL QN: 20.7 NM
LDL SERPL-SCNC: 925 NMOL/L
LDL SMALL SERPL-SCNC: 391 NMOL/L
LDLC SERPL CALC-MCNC: 65 MG/DL (ref 0–99)
LP-IR SCORE SERPL: 42
POTASSIUM SERPL-SCNC: 5.4 MMOL/L (ref 3.5–5.2)
PROT SERPL-MCNC: 7.6 G/DL (ref 6–8.5)
SODIUM SERPL-SCNC: 138 MMOL/L (ref 134–144)
TRIGL SERPL-MCNC: 90 MG/DL (ref 0–149)

## 2023-01-30 ENCOUNTER — OFFICE VISIT (OUTPATIENT)
Dept: CARDIOLOGY CLINIC | Age: 73
End: 2023-01-30
Payer: MEDICARE

## 2023-01-30 VITALS
WEIGHT: 175 LBS | SYSTOLIC BLOOD PRESSURE: 130 MMHG | DIASTOLIC BLOOD PRESSURE: 70 MMHG | OXYGEN SATURATION: 99 % | HEART RATE: 79 BPM | BODY MASS INDEX: 29.16 KG/M2 | HEIGHT: 65 IN

## 2023-01-30 DIAGNOSIS — I25.118 CORONARY ARTERY DISEASE OF NATIVE ARTERY OF NATIVE HEART WITH STABLE ANGINA PECTORIS (HCC): ICD-10-CM

## 2023-01-30 DIAGNOSIS — I10 ESSENTIAL HYPERTENSION: Primary | ICD-10-CM

## 2023-01-30 DIAGNOSIS — E78.5 DYSLIPIDEMIA: ICD-10-CM

## 2023-01-30 PROCEDURE — 3078F DIAST BP <80 MM HG: CPT | Performed by: SPECIALIST

## 2023-01-30 PROCEDURE — G8427 DOCREV CUR MEDS BY ELIG CLIN: HCPCS | Performed by: SPECIALIST

## 2023-01-30 PROCEDURE — G9899 SCRN MAM PERF RSLTS DOC: HCPCS | Performed by: SPECIALIST

## 2023-01-30 PROCEDURE — G8536 NO DOC ELDER MAL SCRN: HCPCS | Performed by: SPECIALIST

## 2023-01-30 PROCEDURE — G8399 PT W/DXA RESULTS DOCUMENT: HCPCS | Performed by: SPECIALIST

## 2023-01-30 PROCEDURE — G9717 DOC PT DX DEP/BP F/U NT REQ: HCPCS | Performed by: SPECIALIST

## 2023-01-30 PROCEDURE — 3017F COLORECTAL CA SCREEN DOC REV: CPT | Performed by: SPECIALIST

## 2023-01-30 PROCEDURE — 99214 OFFICE O/P EST MOD 30 MIN: CPT | Performed by: SPECIALIST

## 2023-01-30 PROCEDURE — 1123F ACP DISCUSS/DSCN MKR DOCD: CPT | Performed by: SPECIALIST

## 2023-01-30 PROCEDURE — 1101F PT FALLS ASSESS-DOCD LE1/YR: CPT | Performed by: SPECIALIST

## 2023-01-30 PROCEDURE — G8417 CALC BMI ABV UP PARAM F/U: HCPCS | Performed by: SPECIALIST

## 2023-01-30 PROCEDURE — 3075F SYST BP GE 130 - 139MM HG: CPT | Performed by: SPECIALIST

## 2023-01-30 PROCEDURE — 1090F PRES/ABSN URINE INCON ASSESS: CPT | Performed by: SPECIALIST

## 2023-01-30 RX ORDER — NEBIVOLOL 10 MG/1
10 TABLET ORAL DAILY
Qty: 90 TABLET | Refills: 3 | Status: SHIPPED | OUTPATIENT
Start: 2023-01-30 | End: 2023-02-03 | Stop reason: SDUPTHER

## 2023-01-30 NOTE — PROGRESS NOTES
Mirlande Camp MD. [facility]              Patient: Melba Joel  : 1950      Today's Date: 2023          HISTORY OF PRESENT ILLNESS:     History of Present Illness:  Here for follow-up. She is doing OK overall. Says she has a big appetite and eats junk food. PAST MEDICAL HISTORY:     Past Medical History:   Diagnosis Date    Arthritis     Asthma     in 25s - no longer a problem    CAD (coronary artery disease)     NSTEMI with PCI (LETICIA)  to RCA 17     Depression     Dyslipidemia     H/O heart artery stent     x1    Hypertension     Ill-defined condition     Hepatitis C    Knee injury     Liver disease     hep c--had treatment, states no longer present    Nausea & vomiting     Seizures ([facility] [address]     Smoker          Past Surgical History:   Procedure Laterality Date    COLONOSCOPY N/A 2019    COLONOSCOPY performed by Jhony Parisi MD at [facility] ENDOSCOPY    HX COLONOSCOPY      HX CORONARY STENT PLACEMENT      x1    HX ORTHOPAEDIC Bilateral     foot surgery    MO UNLISTED PROCEDURE VASCULAR SURGERY      left leg varicose vein stripping             CURRENT MEDICATIONS:    .  Current Outpatient Medications   Medication Sig Dispense Refill    metoprolol tartrate (LOPRESSOR) 50 mg tablet TAKE 1 TABLET TWICE DAILY 180 Tablet 1    atorvastatin (LIPITOR) 40 mg tablet TAKE 1 TABLET EVERY DAY AT 5PM 90 Tablet 1    ezetimibe (ZETIA) 10 mg tablet Take 1 Tablet by mouth daily. 90 Tablet 3    linaCLOtide (Linzess) 145 mcg cap capsule TAKE 1 CAPSULE EVERY DAY BEFORE BREAKFAST 90 Capsule 1    olmesartan (BENICAR) 20 mg tablet Take 1 Tablet by mouth daily. Appointment needed for further refills. 90 Tablet 0    cholecalciferol, vitamin D3, 50 mcg (2,000 unit) tab Take  by mouth. aspirin 81 mg chewable tablet Take 1 Tab by mouth daily.  30 Tab 0           Allergies   Allergen Reactions    Pcn [Penicillins] Unknown (comments)           SOCIAL HISTORY:     Social History     Tobacco Use    Smoking status: Every Day     Packs/day: 0.10     Types: Cigarettes    Smokeless tobacco: Never    Tobacco comments:     smokes 3-4 cigarettes per day x 25 years   Vaping Use    Vaping Use: Never used   Substance Use Topics    Alcohol use: Yes     Comment: about 5 drinks per week    Drug use: No           FAMILY HISTORY:     Family History   Problem Relation Age of Onset    Elevated Lipids Mother     Cancer Father         LUNG AND THROAT    Parkinson's Disease Father     Breast Cancer Paternal Aunt            REVIEW OF SYMPTOMS:     Review of Symptoms:  Constitutional: Negative for fever, chills  HEENT: Negative for nosebleeds, tinnitus, and vision changes. Respiratory: Negative for cough, wheezing  Cardiovascular: Negative for orthopnea, claudication, syncope, and PND. Gastrointestinal: Negative for abdominal pain, diarrhea, melena. Genitourinary: Negative for dysuria  Musculoskeletal: Negative for myalgias. Skin: Negative for rash  Heme: No problems bleeding. Neurological: Negative for speech change and focal weakness. PHYSICAL EXAM:     Physical Exam:  Visit Vitals  /70 (BP 1 Location: Right upper arm, BP Patient Position: Sitting)   Pulse 79   Ht 5' 5\" (1.651 m)   Wt 175 lb (79.4 kg)   SpO2 99%   BMI 29.12 kg/m²     Patient appears generally well, mood and affect are appropriate and pleasant. HEENT:  Hearing intact, non-icteric, normocephalic, atraumatic. Neck Exam: Supple, No JVD + left neck bruit   Lung Exam: Clear to auscultation, even breath sounds. Cardiac Exam: Regular rate and rhythm with no murmur or rub  Abdomen: Soft, non-tender, normal bowel sounds   Extremities: Moves all ext well.   MSKTL: Overall good ROM ext  Skin: No significant rashes  Psych: Appropriate affect  Neuro - Grossly intact        LABS / OTHER STUDIES reviewed:       Lab Results   Component Value Date/Time    Sodium 138 01/26/2023 04:17 PM    Potassium 5.4 (H) 01/26/2023 04:17 PM    Chloride 104 01/26/2023 04:17 PM    CO2 22 01/26/2023 04:17 PM    Anion gap 3 (L) 03/21/2022 03:50 PM    Glucose 82 01/26/2023 04:17 PM    BUN 35 (H) 01/26/2023 04:17 PM    Creatinine 1.40 (H) 01/26/2023 04:17 PM    BUN/Creatinine ratio 25 01/26/2023 04:17 PM    GFR est AA 42 (L) 03/21/2022 03:50 PM    GFR est non-AA 34 (L) 03/21/2022 03:50 PM    Calcium 9.6 01/26/2023 04:17 PM    Bilirubin, total 0.4 01/26/2023 04:17 PM    Alk. phosphatase 82 01/26/2023 04:17 PM    Protein, total 7.6 01/26/2023 04:17 PM    Albumin 4.7 01/26/2023 04:17 PM    Globulin 3.8 03/21/2022 03:50 PM    A-G Ratio 1.6 01/26/2023 04:17 PM    ALT (SGPT) 15 01/26/2023 04:17 PM    AST (SGOT) 32 01/26/2023 04:17 PM     Lab Results   Component Value Date/Time    WBC 6.5 02/14/2022 12:00 AM    HGB 11.3 02/14/2022 12:00 AM    HCT 35.6 02/14/2022 12:00 AM    PLATELET 062 (L) 46/33/9811 12:00 AM    MCV 97 02/14/2022 12:00 AM     Component      Latest Ref Rng & Units 1/26/2023             4:17 PM   LDL-P      <1,000 nmol/L 925   LDL-C(NIH CALC)      0 - 99 mg/dL 65   HDL-C      >39 mg/dL 38 (L)   Triglycerides      0 - 149 mg/dL 90   Cholesterol, total      100 - 199 mg/dL 120   HDL-P (Total)      >=30.5 umol/L 26.4 (L)   Small LDL-P      <=527 nmol/L 391   LDL size      >20.5 nm 20.7   LP-IR SCORE      <=45 42           CARDIAC DIAGNOSTICS:     Cardiac Evaluation Includes:  I reviewed the results below. Echo 5/8/17 - LVEF 55-60%, grade 1 diastology  Exercise Cardiolite 5/8/17 - walked 7:31 (10.1 METS), ischemic EKG changes (2 mm inferior ST depression). + VALENTE but no CP. Small, mild, partially reversible defects in anteroseptal and inferior walls. SSS = 3, SRS = 1, SDS = 2.    Dilated LV with LVEF 37%  Carotid Dopplers 5/8/17 - mild 10-49% stenosis bilat      S/p right femoral thrombectomy and bovine patch plasty of right CFA/PFA 5/17/17 (s/p cath 5/12/17)      EKG 5/29/17 - NSR, LVH  Labs 5/29/17 - Hgb 10.9, LFT's OK     NSTEMI with PCI (LETICIA)  to RCA 5/12/17 Cath 5/12/17 - LM 20-30% narrowing, LAD calcified however good lumen, LCX calcified but OK lumen, RCA with 95% ostial stenosis and 100% proximal occlusion. Had PCI of RCA with LETICIA. LVEF 50-55% with inferobasal HK     Echo 10/5/20 - LVEF 62%, grade 1 diastology     Carotid Doppler 4/11/22 - < 50% stenosis bilat         EKG 4/4/17 - NSR, LVH  EKG 8/3/20 - sinus david, septal Q's   EKG 10/24/22 - NSR, septal Q waves              ASSESSMENT AND PLAN:     Assessment and Plan:    1)  CAD  - NSTEMI with PCI (LETICIA)  to RCA 5/12/17   - Cath 5/12/17 - LM 20-30% narrowing, LAD calcified however good lumen, LCX calcified but OK lumen, RCA with 95% ostial stenosis and 100% proximal occlusion. Had PCI of RCA with LETICIA. LVEF 50-55% with inferobasal HK  - she feels well and denies CP or sig SOB - class 1 VALENTE  - cont ASA, BB,  and statin. - smoking cessation advised again   - check an echo next visit       2) Carotid Bruit   - mild carotid disease 5/17   - Carotid Doppler 4/11/22 - < 50% stenosis bilat      3) HTN  - BP OK   - On 1/30/23 - K+ high on multiple checks ---> Will olmesartan and metoprolol --- use Bystolic 10 mg daily   - follow BP at home and see NP in month -- if BP is high, can increase Bystrolic (or add low dose norvasc)     4) Dyslipidemia  - cont statin and Zetia  - recent lipids fair   -- continue meds ---  goal LDL < 70   - she admits to liking sweets and eating poorly --- talked about a healthier diet       5) Chronic Leg pain   - has seen Rheum     6) Depression and memory problems   - per PCP      7) CKD  - follow labs  - saw Nephrology before     8) See NP in 1 month with an echo then. 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 Romulo Hood MD, [facility]  [address], [address]  [place] [address]  [address] [facility] [address], [facility] [address]  [place], [facility] [address] [facility]  Ph: [phone]    [phone]

## 2023-01-30 NOTE — PROGRESS NOTES
Chief Complaint   Patient presents with    Follow-up     3 months    Coronary Artery Disease    Hypertension     Vitals:    01/30/23 1513   BP: 130/70   BP 1 Location: Right upper arm   BP Patient Position: Sitting   Pulse: 79   Height: 5' 5\" (1.651 m)   Weight: 175 lb (79.4 kg)   SpO2: 99%       Chest pain denied     SOB denied     Palpitations denied     Swelling in hands/feet denied     Dizziness denied     Recent hospital stays denied     Refills denied

## 2023-01-30 NOTE — PROGRESS NOTES
Dear Ms. Azulmelissatremaine Salgado,  Your test results are stable. Please let me know if you have any questions.    Hima Iqbal,  Dr. Fatou Og

## 2023-02-03 ENCOUNTER — TELEPHONE (OUTPATIENT)
Dept: CARDIOLOGY CLINIC | Age: 73
End: 2023-02-03

## 2023-02-03 RX ORDER — NEBIVOLOL 10 MG/1
10 TABLET ORAL DAILY
Qty: 90 TABLET | Refills: 3 | Status: SHIPPED | OUTPATIENT
Start: 2023-02-03

## 2023-02-03 NOTE — TELEPHONE ENCOUNTER
Good Rx coupon:  $15.87 for 90 days at [facility]  $18.57 [facility]  $25.64 [facility]  $33.86 Arvind Wagner [id number]  [facility] [address]  [place] [id number]    Spoke to pt,  The closest "[employer] [facility] is on [address].

## 2023-02-03 NOTE — TELEPHONE ENCOUNTER
Patient called and wanted to know for the medication Bystolic 64RD is there a generic brand or something else she could take, advised the Bystolic was $01. Please advise.     Pt # [phone]

## 2023-02-08 ENCOUNTER — TELEPHONE (OUTPATIENT)
Dept: CARDIOLOGY CLINIC | Age: 73
End: 2023-02-08

## 2023-02-08 RX ORDER — NEBIVOLOL 10 MG/1
10 TABLET ORAL DAILY
Qty: 90 TABLET | Refills: 3 | Status: SHIPPED | OUTPATIENT
Start: 2023-02-08

## 2023-02-08 NOTE — TELEPHONE ENCOUNTER
Refill per VO of Dr. Ilene Friend  Last appt: Visit date not found  Future Appointments   Date Time Provider Humberto Root   3/7/2023 10:20 AM JACKIE Khalil BS AMB   5/1/2023 10:00 AM BRIDGETT CORRAL BS AMB   5/1/2023 11:20 AM Talib Rivera MD [facility] BS AMB       Requested Prescriptions     Signed Prescriptions Disp Refills    nebivoloL (BYSTOLIC) 10 mg tablet 90 Tablet 3     Sig: Take 1 Tablet by mouth daily.      Authorizing Provider: Stefani Estevez     Ordering User: Trent Garrison

## 2023-02-08 NOTE — TELEPHONE ENCOUNTER
Pt would like prescription for nebivolol 10mg sent to [facility], pt would like the generic sent to this pharmacy due to the cost of the medication    [facility] [phone]      Pt# [phone]

## 2023-03-06 NOTE — PROGRESS NOTES
Patient: Geetha Ellison  : 1950    Primary Cardiologist: Ang Boyer MD. [facility]  Last Office Visit: 23    Today's Date: 3/7/2023      HISTORY OF PRESENT ILLNESS:     History of Present Illness:  Has not been checking BP at home, her cuff is broken. She says overall she feels well. Denies chest pain, shortness of breath, edema, palpitations, lightheadedness, dizziness. PAST MEDICAL HISTORY:     Past Medical History:   Diagnosis Date    Arthritis     Asthma     in 25s - no longer a problem    CAD (coronary artery disease)     NSTEMI with PCI (LETICIA)  to RCA 17     CKD (chronic kidney disease) stage 3, GFR 30-59 ml/min (HCC)     Depression     Dyslipidemia     H/O heart artery stent     x1    Hypertension     Ill-defined condition     Hepatitis C    Knee injury     Liver disease     hep c--had treatment, states no longer present    Nausea & vomiting     Seizures ([address]     Smoker          Past Surgical History:   Procedure Laterality Date    COLONOSCOPY N/A 2019    COLONOSCOPY performed by Hesham Jacobo MD at [facility] END[facility]    HX COLONOSCOPY      HX CORONARY STENT PLACEMENT      x1    HX ORTHOPAEDIC Bilateral     foot surgery    TX UNLISTED PROCEDURE VASCULAR SURGERY  10-    left leg varicose vein stripping             CURRENT MEDICATIONS:    .  Current Outpatient Medications   Medication Sig Dispense Refill    amLODIPine (NORVASC) 2.5 mg tablet Take 1 Tablet by mouth daily. 30 Tablet 3    nebivoloL (BYSTOLIC) 10 mg tablet Take 1 Tablet by mouth daily. 90 Tablet 3    atorvastatin (LIPITOR) 40 mg tablet TAKE 1 TABLET EVERY DAY AT 5PM 90 Tablet 1    ezetimibe (ZETIA) 10 mg tablet Take 1 Tablet by mouth daily. 90 Tablet 3    linaCLOtide (Linzess) 145 mcg cap capsule TAKE 1 CAPSULE EVERY DAY BEFORE BREAKFAST 90 Capsule 1    cholecalciferol, vitamin D3, 50 mcg (2,000 unit) tab Take  by mouth. aspirin 81 mg chewable tablet Take 1 Tab by mouth daily.  30 Tab 0 Allergies   Allergen Reactions    Pcn [Penicillins] Unknown (comments)           SOCIAL HISTORY:     Social History     Tobacco Use    Smoking status: Some Days     Packs/day: 0.10     Types: Cigarettes    Smokeless tobacco: Never    Tobacco comments:     smokes 3-4 cigarettes per day x 25 years   Vaping Use    Vaping Use: Never used   Substance Use Topics    Alcohol use: Yes     Comment: about 5 drinks per week    Drug use: No           FAMILY HISTORY:     Family History   Problem Relation Age of Onset    Elevated Lipids Mother     Cancer Father         LUNG AND THROAT    Parkinson's Disease Father     Breast Cancer Paternal Aunt            REVIEW OF SYMPTOMS:     Review of Symptoms:  Constitutional: Negative for fever, chills  HEENT: Negative for nosebleeds, tinnitus, and vision changes. Respiratory: Negative for cough, wheezing  Cardiovascular: Negative for orthopnea, claudication, syncope, and PND. Gastrointestinal: Negative for abdominal pain, diarrhea, melena. Genitourinary: Negative for dysuria  Musculoskeletal: Negative for myalgias. Skin: Negative for rash  Heme: No problems bleeding. Neurological: Negative for speech change and focal weakness. PHYSICAL EXAM:     Physical Exam:  Visit Vitals  BP (!) 142/76   Pulse 80   Ht 5' 5\" (1.651 m)   Wt 179 lb (81.2 kg)   SpO2 96%   BMI 29.79 kg/m²       Patient appears generally well, mood and affect are appropriate and pleasant. HEENT:  Hearing intact, non-icteric, normocephalic, atraumatic. Neck Exam: Supple, No JVD + left neck bruit   Lung Exam: Clear to auscultation, even breath sounds. Cardiac Exam: Regular rate and rhythm with no murmur or rub  Abdomen: Soft, non-tender, normal bowel sounds   Extremities: Moves all ext well.   MSKTL: Overall good ROM ext  Skin: No significant rashes  Psych: Appropriate affect  Neuro - Grossly intact        LABS / OTHER STUDIES reviewed:       Lab Results   Component Value Date/Time    Sodium 138 01/26/2023 04:17 PM    Potassium 5.4 (H) 01/26/2023 04:17 PM    Chloride 104 01/26/2023 04:17 PM    CO2 22 01/26/2023 04:17 PM    Anion gap 3 (L) 03/21/2022 03:50 PM    Glucose 82 01/26/2023 04:17 PM    BUN 35 (H) 01/26/2023 04:17 PM    Creatinine 1.40 (H) 01/26/2023 04:17 PM    BUN/Creatinine ratio 25 01/26/2023 04:17 PM    GFR est AA 42 (L) 03/21/2022 03:50 PM    GFR est non-AA 34 (L) 03/21/2022 03:50 PM    Calcium 9.6 01/26/2023 04:17 PM    Bilirubin, total 0.4 01/26/2023 04:17 PM    Alk. phosphatase 82 01/26/2023 04:17 PM    Protein, total 7.6 01/26/2023 04:17 PM    Albumin 4.7 01/26/2023 04:17 PM    Globulin 3.8 03/21/2022 03:50 PM    A-G Ratio 1.6 01/26/2023 04:17 PM    ALT (SGPT) 15 01/26/2023 04:17 PM    AST (SGOT) 32 01/26/2023 04:17 PM     Lab Results   Component Value Date/Time    WBC 6.5 02/14/2022 12:00 AM    HGB 11.3 02/14/2022 12:00 AM    HCT 35.6 02/14/2022 12:00 AM    PLATELET 907 (L) 62/04/0890 12:00 AM    MCV 97 02/14/2022 12:00 AM     Component      Latest Ref Rng & Units 1/26/2023             4:17 PM   LDL-P      <1,000 nmol/L 925   LDL-C(NIH CALC)      0 - 99 mg/dL 65   HDL-C      >39 mg/dL 38 (L)   Triglycerides      0 - 149 mg/dL 90   Cholesterol, total      100 - 199 mg/dL 120   HDL-P (Total)      >=30.5 umol/L 26.4 (L)   Small LDL-P      <=527 nmol/L 391   LDL size      >20.5 nm 20.7   LP-IR SCORE      <=45 42           CARDIAC DIAGNOSTICS:     Cardiac Evaluation Includes:  I reviewed the results below. Echo 5/8/17 - LVEF 55-60%, grade 1 diastology  Exercise Cardiolite 5/8/17 - walked 7:31 (10.1 METS), ischemic EKG changes (2 mm inferior ST depression). + VALENTE but no CP. Small, mild, partially reversible defects in anteroseptal and inferior walls. SSS = 3, SRS = 1, SDS = 2.    Dilated LV with LVEF 37%  Carotid Dopplers 5/8/17 - mild 10-49% stenosis bilat      S/p right femoral thrombectomy and bovine patch plasty of right CFA/PFA 5/17/17 (s/p cath 5/12/17)      EKG 5/29/17 - NSR, LVH  Labs 5/29/17 - Hgb 10.9, LFT's OK     NSTEMI with PCI (LETICIA)  to RCA 5/12/17      Cath 5/12/17 - LM 20-30% narrowing, LAD calcified however good lumen, LCX calcified but OK lumen, RCA with 95% ostial stenosis and 100% proximal occlusion. Had PCI of RCA with LETICIA. LVEF 50-55% with inferobasal HK     Echo 10/5/20 - LVEF 62%, grade 1 diastology     Carotid Doppler 4/11/22 - < 50% stenosis bilat         EKG 4/4/17 - NSR, LVH  EKG 8/3/20 - sinus david, septal Q's   EKG 10/24/22 - NSR, septal Q waves              ASSESSMENT AND PLAN:     Assessment and Plan:    1)  CAD  - NSTEMI with PCI (LETICIA)  to RCA 5/12/17   - Cath 5/12/17 - LM 20-30% narrowing, LAD calcified however good lumen, LCX calcified but OK lumen, RCA with 95% ostial stenosis and 100% proximal occlusion. Had PCI of RCA with LETICIA. LVEF 50-55% with inferobasal HK  - she feels well and denies CP or sig SOB - class 1 VALENTE  - cont ASA, BB,  and statin. - smoking cessation advised again   - check an echo next visit       2) Carotid Bruit   - mild carotid disease 5/17   - Carotid Doppler 4/11/22 - < 50% stenosis bilat      3) HTN  - BP OK   - On 1/30/23 - K+ high on multiple checks ---> Will stop olmesartan and metoprolol --- use Bystolic 10 mg daily   - BP remains elevated 140/70s - will add 2.5 mg amlodipine --> asked her to get new cuff and check BP at home, bring log to next visit -- if BP remains high can increase bystolic, amlodipine  - will recheck bmp      4) Dyslipidemia  - cont statin and Zetia  - recent lipids fair   -- continue meds ---  goal LDL < 70   - she admits to liking sweets and eating poorly --- talked about a healthier diet       5) Chronic Leg pain   - has seen Rheum     6) Depression and memory problems   - per PCP      7) CKD  - follow labs  - saw Nephrology before     She will see me in 1 month for BP follow-up. 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         Marietta Serrano, JOSSIE    [facility]  [address]   [address] [facility], [address]  [facility] [address]  [address]  Sadie Alamo [address] [place] [address], [address] [place] [address]  Ph: [phone]   Ph [phone]

## 2023-03-07 ENCOUNTER — OFFICE VISIT (OUTPATIENT)
Dept: CARDIOLOGY CLINIC | Age: 73
End: 2023-03-07
Payer: MEDICARE

## 2023-03-07 VITALS
DIASTOLIC BLOOD PRESSURE: 76 MMHG | HEART RATE: 80 BPM | OXYGEN SATURATION: 96 % | WEIGHT: 179 LBS | HEIGHT: 65 IN | SYSTOLIC BLOOD PRESSURE: 142 MMHG | BODY MASS INDEX: 29.82 KG/M2

## 2023-03-07 DIAGNOSIS — E78.5 DYSLIPIDEMIA: ICD-10-CM

## 2023-03-07 DIAGNOSIS — I73.9 PERIPHERAL VASCULAR DISEASE (HCC): ICD-10-CM

## 2023-03-07 DIAGNOSIS — I10 ESSENTIAL HYPERTENSION: Primary | ICD-10-CM

## 2023-03-07 DIAGNOSIS — I25.118 CORONARY ARTERY DISEASE OF NATIVE ARTERY OF NATIVE HEART WITH STABLE ANGINA PECTORIS (HCC): ICD-10-CM

## 2023-03-07 PROCEDURE — 1123F ACP DISCUSS/DSCN MKR DOCD: CPT

## 2023-03-07 PROCEDURE — 3078F DIAST BP <80 MM HG: CPT

## 2023-03-07 PROCEDURE — 99214 OFFICE O/P EST MOD 30 MIN: CPT

## 2023-03-07 PROCEDURE — 3077F SYST BP >= 140 MM HG: CPT

## 2023-03-07 RX ORDER — AMLODIPINE BESYLATE 2.5 MG/1
2.5 TABLET ORAL DAILY
Qty: 30 TABLET | Refills: 3 | Status: SHIPPED | OUTPATIENT
Start: 2023-03-07

## 2023-03-07 NOTE — PATIENT INSTRUCTIONS
Please begin taking amlodipine 2.5 mg daily - this is for your blood pressure. Dr. Corby Mario had started you on bystolic 10 mg daily - this is for your blood pressure. Atorvastatin (lipitor) and zetia are for your cholesterol. Parameters:  Systolic (top number) [phone]  Diastolic (bottom number) 46-38  Heart rate       How to get your blood pressure checked:   Before  During  After    In the 30 minutes before your BP is taken:   NO Smoking   NO Caffeine   NO Exercise  Make sure the cuff is the right size and in the right place. Wait 5 minutes and retake your BP if needed. In case of machine error. Keep your cuffed arm on a flat surface, like a table, and at heart level. Cuff should be at heart level not your arm Keep a log and bring it to every check up. Sit STILL for 5 minutes prior to taking your BP. Sit upright, back straight, feet flat on the floor. Do not check your blood pressure more then once at a time, repeated attempts will only increase the numbers. Only take your B/P twice a day at least an hour after taking your medication. Preferably once in the morning and once in the evening. If you get 3 or more consecutive readings outside of these parameters or have symptoms please let us know so that we can adjust your medication. If you recently have started, stopped, or changed a medication dosage please give your body at least a week or two to get use to the change. Avoid high-sodium foods  Avoid eating:  Smoked, cured, salted, and canned meat, fish, and poultry. Ham, marks, hot dogs, and luncheon meats. Regular, hard, and processed cheese and regular peanut butter. Crackers with salted tops, and other salted snack foods such as pretzels, chips, and salted popcorn. Frozen prepared meals, unless labeled low-sodium. Canned and dried soups, broths, and bouillon, unless labeled sodium-free or low-sodium. Canned vegetables, unless labeled sodium-free or low-sodium.   [place] fries, pizza, tacos, and other fast foods. Pickles, olives, ketchup, and other condiments, especially soy sauce, unless labeled sodium-free or low-sodium.

## 2023-03-07 NOTE — LETTER
3/7/2023    Patient: Beverely Ganser   YOB: 1950   Date of Visit: 3/7/2023     Domingo Medel NP  [id number] [facility] [phone]  [address] [facility] [address]    Dear Domingo Medel NP,      Thank you for referring Ms. Sherri Robertson to [address] [place] [address] for evaluation. My notes for this consultation are attached. If you have questions, please do not hesitate to call me. I look forward to following your patient along with you.       Sincerely,    JACKIE Guzman

## 2023-03-07 NOTE — PROGRESS NOTES
Chief Complaint   Patient presents with    Follow-up     1 month    Hypertension    Coronary Artery Disease    Cholesterol Problem     Vitals:    03/07/23 1031 03/07/23 1039   BP: (!) 158/76 (!) 142/68   BP 1 Location: Left upper arm Left upper arm   BP Patient Position: Sitting Sitting   BP Cuff Size: Large adult Large adult   Pulse: 80    Height: 5' 5\" (1.651 m)    Weight: 179 lb (81.2 kg)    SpO2: 96%      Chest pain denied   SOB denied   Palpitations denied   Swelling in hands/feet denied   Dizziness denied   Recent hospital stays denied   Refills denied     Noted was able to get Bystolic

## 2023-03-14 ENCOUNTER — TELEPHONE (OUTPATIENT)
Dept: CARDIOLOGY CLINIC | Age: 73
End: 2023-03-14

## 2023-03-14 DIAGNOSIS — N28.9 ABNORMAL RENAL FUNCTION: Primary | ICD-10-CM

## 2023-03-14 NOTE — TELEPHONE ENCOUNTER
Called pt, LVM  Per NMeredith Schmidt NP: Aby Benítez you have her repeat BMP labs in 2 weeks ? This is likely not going to change. Also, can you have her referred to nephrology since renal functioning is worsening. \"    NV 4/11/23 11:00 am neo Cheung    Mailed lab results, referral, new lab order to pt. Faxed last x2 OV and labs to Nephrology.

## 2023-07-31 DIAGNOSIS — I10 ESSENTIAL (PRIMARY) HYPERTENSION: ICD-10-CM

## 2023-07-31 RX ORDER — AMLODIPINE BESYLATE 2.5 MG/1
2.5 TABLET ORAL DAILY
Qty: 30 TABLET | Refills: 1 | Status: SHIPPED | OUTPATIENT
Start: 2023-07-31

## 2023-08-09 ENCOUNTER — TELEPHONE (OUTPATIENT)
Age: 73
End: 2023-08-09

## 2023-08-09 DIAGNOSIS — E78.5 HYPERLIPIDEMIA, UNSPECIFIED: ICD-10-CM

## 2023-08-09 DIAGNOSIS — R06.09 OTHER FORMS OF DYSPNEA: ICD-10-CM

## 2023-08-09 DIAGNOSIS — I10 ESSENTIAL (PRIMARY) HYPERTENSION: Primary | ICD-10-CM

## 2023-08-09 NOTE — TELEPHONE ENCOUNTER
Pt called and would like to know do she need blood work done before hadley,please advise    [phone]

## 2023-08-10 NOTE — TELEPHONE ENCOUNTER
Per MELIDA Malone NP: [facility] can you have her get BMP, lipid panel\"  Mailed to pt's home address.

## 2023-08-23 LAB
BUN SERPL-MCNC: 13 MG/DL (ref 8–27)
BUN/CREAT SERPL: 12 (ref 12–28)
CALCIUM SERPL-MCNC: 9.6 MG/DL (ref 8.7–10.3)
CHLORIDE SERPL-SCNC: 103 MMOL/L (ref 96–106)
CHOLEST SERPL-MCNC: 127 MG/DL (ref 100–199)
CO2 SERPL-SCNC: 20 MMOL/L (ref 20–29)
CREAT SERPL-MCNC: 1.13 MG/DL (ref 0.57–1)
EGFRCR SERPLBLD CKD-EPI 2021: 51 ML/MIN/1.73
GLUCOSE SERPL-MCNC: 98 MG/DL (ref 70–99)
HDLC SERPL-MCNC: 47 MG/DL
IMP & REVIEW OF LAB RESULTS: NORMAL
LDLC SERPL CALC-MCNC: 66 MG/DL (ref 0–99)
POTASSIUM SERPL-SCNC: 5.1 MMOL/L (ref 3.5–5.2)
REPORT: NORMAL
SODIUM SERPL-SCNC: 139 MMOL/L (ref 134–144)
TRIGL SERPL-MCNC: 66 MG/DL (ref 0–149)
VLDLC SERPL CALC-MCNC: 14 MG/DL (ref 5–40)

## 2023-08-28 ENCOUNTER — OFFICE VISIT (OUTPATIENT)
Age: 73
End: 2023-08-28
Payer: MEDICARE

## 2023-08-28 VITALS
BODY MASS INDEX: 30.06 KG/M2 | DIASTOLIC BLOOD PRESSURE: 73 MMHG | SYSTOLIC BLOOD PRESSURE: 185 MMHG | HEIGHT: 65 IN | HEART RATE: 70 BPM | OXYGEN SATURATION: 98 % | WEIGHT: 180.4 LBS | RESPIRATION RATE: 20 BRPM | TEMPERATURE: 98.3 F

## 2023-08-28 DIAGNOSIS — F33.1 MAJOR DEPRESSIVE DISORDER, RECURRENT, MODERATE (HCC): ICD-10-CM

## 2023-08-28 DIAGNOSIS — D68.9 COAGULATION DEFECT (HCC): ICD-10-CM

## 2023-08-28 DIAGNOSIS — M79.671 RIGHT FOOT PAIN: ICD-10-CM

## 2023-08-28 DIAGNOSIS — Z12.31 SCREENING MAMMOGRAM FOR BREAST CANCER: ICD-10-CM

## 2023-08-28 DIAGNOSIS — R41.3 MEMORY LOSS: ICD-10-CM

## 2023-08-28 DIAGNOSIS — Z00.00 WELL EXAM WITHOUT ABNORMAL FINDINGS OF PATIENT 18 YEARS OF AGE OR OLDER: Primary | ICD-10-CM

## 2023-08-28 PROCEDURE — 99214 OFFICE O/P EST MOD 30 MIN: CPT | Performed by: NURSE PRACTITIONER

## 2023-08-28 PROCEDURE — G8417 CALC BMI ABV UP PARAM F/U: HCPCS | Performed by: NURSE PRACTITIONER

## 2023-08-28 PROCEDURE — G8427 DOCREV CUR MEDS BY ELIG CLIN: HCPCS | Performed by: NURSE PRACTITIONER

## 2023-08-28 PROCEDURE — 3017F COLORECTAL CA SCREEN DOC REV: CPT | Performed by: NURSE PRACTITIONER

## 2023-08-28 PROCEDURE — 4004F PT TOBACCO SCREEN RCVD TLK: CPT | Performed by: NURSE PRACTITIONER

## 2023-08-28 PROCEDURE — 1123F ACP DISCUSS/DSCN MKR DOCD: CPT | Performed by: NURSE PRACTITIONER

## 2023-08-28 PROCEDURE — 3077F SYST BP >= 140 MM HG: CPT | Performed by: NURSE PRACTITIONER

## 2023-08-28 PROCEDURE — 3078F DIAST BP <80 MM HG: CPT | Performed by: NURSE PRACTITIONER

## 2023-08-28 PROCEDURE — 1090F PRES/ABSN URINE INCON ASSESS: CPT | Performed by: NURSE PRACTITIONER

## 2023-08-28 PROCEDURE — G8399 PT W/DXA RESULTS DOCUMENT: HCPCS | Performed by: NURSE PRACTITIONER

## 2023-08-28 PROCEDURE — G0439 PPPS, SUBSEQ VISIT: HCPCS | Performed by: NURSE PRACTITIONER

## 2023-08-28 RX ORDER — BUPROPION HYDROCHLORIDE 300 MG/1
300 TABLET ORAL EVERY MORNING
Qty: 90 TABLET | Refills: 3 | Status: SHIPPED | OUTPATIENT
Start: 2023-08-28

## 2023-08-28 ASSESSMENT — PATIENT HEALTH QUESTIONNAIRE - PHQ9
4. FEELING TIRED OR HAVING LITTLE ENERGY: 0
6. FEELING BAD ABOUT YOURSELF - OR THAT YOU ARE A FAILURE OR HAVE LET YOURSELF OR YOUR FAMILY DOWN: 0
SUM OF ALL RESPONSES TO PHQ QUESTIONS 1-9: 0
10. IF YOU CHECKED OFF ANY PROBLEMS, HOW DIFFICULT HAVE THESE PROBLEMS MADE IT FOR YOU TO DO YOUR WORK, TAKE CARE OF THINGS AT HOME, OR GET ALONG WITH OTHER PEOPLE: 0
5. POOR APPETITE OR OVEREATING: 0
SUM OF ALL RESPONSES TO PHQ9 QUESTIONS 1 & 2: 0
8. MOVING OR SPEAKING SO SLOWLY THAT OTHER PEOPLE COULD HAVE NOTICED. OR THE OPPOSITE, BEING SO FIGETY OR RESTLESS THAT YOU HAVE BEEN MOVING AROUND A LOT MORE THAN USUAL: 0
SUM OF ALL RESPONSES TO PHQ QUESTIONS 1-9: 0
2. FEELING DOWN, DEPRESSED OR HOPELESS: 0
7. TROUBLE CONCENTRATING ON THINGS, SUCH AS READING THE NEWSPAPER OR WATCHING TELEVISION: 0
SUM OF ALL RESPONSES TO PHQ QUESTIONS 1-9: 0
3. TROUBLE FALLING OR STAYING ASLEEP: 0
1. LITTLE INTEREST OR PLEASURE IN DOING THINGS: 0
SUM OF ALL RESPONSES TO PHQ QUESTIONS 1-9: 0
9. THOUGHTS THAT YOU WOULD BE BETTER OFF DEAD, OR OF HURTING YOURSELF: 0

## 2023-08-28 ASSESSMENT — LIFESTYLE VARIABLES
HOW OFTEN DO YOU HAVE A DRINK CONTAINING ALCOHOL: 2-3 TIMES A WEEK
HOW MANY STANDARD DRINKS CONTAINING ALCOHOL DO YOU HAVE ON A TYPICAL DAY: 1 OR 2

## 2023-08-28 NOTE — PROGRESS NOTES
Medicare Annual Wellness Visit    Oskar Apodaca is here for Medicare AWV    Assessment & Plan   Well exam without abnormal findings of patient 25years of age or older  Memory loss  -     [facility] - Tarah Ames MD, Neurology, [place]  Coagulation defect [facility])  Major depressive disorder, recurrent, moderate ([address])  Screening mammogram for breast cancer  -     MERCED DIGITAL SCREEN W OR WO CAD BILATERAL; Future  Right foot pain  -     [facility] - Tien Love, DPM, Podiatry, Bev Bose reviewed from cardio  No additional labs needed  Referral given to podiatry for second opinion foot pain/deformity  Mammogram order given  Given referral back to Neuro for worsening memory loss  She has appt with cardio tomorrow; needs to address bp with them    Recommendations for Preventive Services Due: see orders and patient instructions/AVS.  Recommended screening schedule for the next 5-10 years is provided to the patient in written form: see Patient Instructions/AVS.     No follow-ups on file. Subjective   The following acute and/or chronic problems were also addressed today:  She is due for mammogram  She just had labs done for cardio; avail for review in [facility]  She is having left foot pain at the surgical site of the great toe. Previously done by Dr. Rebecca Rasmussen, now deformed and painful  She is also having worsing memory loss which is making her depression worse, embarrassed to be with friend due to her memory and lack of word finding in conversation. Patient's complete Health Risk Assessment and screening values have been reviewed and are found in Flowsheets. The following problems were reviewed today and where indicated follow up appointments were made and/or referrals ordered.     Positive Risk Factor Screenings with Interventions:               General HRA Questions:  Select all that apply: (!) Social Isolation    Social Isolation Interventions:  Patient declined any further interventions or

## 2023-08-29 ENCOUNTER — OFFICE VISIT (OUTPATIENT)
Age: 73
End: 2023-08-29
Payer: MEDICARE

## 2023-08-29 VITALS
DIASTOLIC BLOOD PRESSURE: 84 MMHG | BODY MASS INDEX: 30.16 KG/M2 | WEIGHT: 181 LBS | HEART RATE: 74 BPM | HEIGHT: 65 IN | OXYGEN SATURATION: 99 % | SYSTOLIC BLOOD PRESSURE: 136 MMHG

## 2023-08-29 DIAGNOSIS — R06.09 OTHER FORMS OF DYSPNEA: ICD-10-CM

## 2023-08-29 DIAGNOSIS — E78.5 HYPERLIPIDEMIA, UNSPECIFIED HYPERLIPIDEMIA TYPE: ICD-10-CM

## 2023-08-29 DIAGNOSIS — I10 ESSENTIAL (PRIMARY) HYPERTENSION: Primary | ICD-10-CM

## 2023-08-29 DIAGNOSIS — I25.118 ATHEROSCLEROTIC HEART DISEASE OF NATIVE CORONARY ARTERY WITH OTHER FORMS OF ANGINA PECTORIS (HCC): ICD-10-CM

## 2023-08-29 PROCEDURE — 1090F PRES/ABSN URINE INCON ASSESS: CPT

## 2023-08-29 PROCEDURE — 4004F PT TOBACCO SCREEN RCVD TLK: CPT

## 2023-08-29 PROCEDURE — 3075F SYST BP GE 130 - 139MM HG: CPT

## 2023-08-29 PROCEDURE — 3017F COLORECTAL CA SCREEN DOC REV: CPT

## 2023-08-29 PROCEDURE — 1123F ACP DISCUSS/DSCN MKR DOCD: CPT

## 2023-08-29 PROCEDURE — 99214 OFFICE O/P EST MOD 30 MIN: CPT

## 2023-08-29 PROCEDURE — G8399 PT W/DXA RESULTS DOCUMENT: HCPCS

## 2023-08-29 PROCEDURE — G8417 CALC BMI ABV UP PARAM F/U: HCPCS

## 2023-08-29 PROCEDURE — G8427 DOCREV CUR MEDS BY ELIG CLIN: HCPCS

## 2023-08-29 PROCEDURE — 3079F DIAST BP 80-89 MM HG: CPT

## 2023-08-29 NOTE — PATIENT INSTRUCTIONS
Please check BP twice per day at home. Bring log and cuff to next visit. Parameters:  Systolic (top number) [phone]  Diastolic (bottom number) 64-96  Heart rate       How to get your blood pressure checked:   Before  During  After    In the 30 minutes before your BP is taken:   NO Smoking   NO Caffeine   NO Exercise  Make sure the cuff is the right size and in the right place. Wait 5 minutes and retake your BP if needed. In case of machine error. Keep your cuffed arm on a flat surface, like a table, and at heart level. Cuff should be at heart level not your arm Keep a log and bring it to every check up. Sit STILL for 5 minutes prior to taking your BP. Sit upright, back straight, feet flat on the floor. Do not check your blood pressure more then once at a time, repeated attempts will only increase the numbers. Only take your B/P twice a day at least an hour after taking your medication. Preferably once in the morning and once in the evening. If you get 3 or more consecutive readings outside of these parameters or have symptoms please let us know so that we can adjust your medication. If you recently have started, stopped, or changed a medication dosage please give your body at least a week or two to get use to the change. Avoid high-sodium foods  Avoid eating:  Smoked, cured, salted, and canned meat, fish, and poultry. Ham, [place], hot dogs, and luncheon meats. Regular, hard, and processed cheese and regular peanut butter. Crackers with salted tops, and other salted snack foods such as pretzels, chips, and salted popcorn. Frozen prepared meals, unless labeled low-sodium. Canned and dried soups, broths, and bouillon, unless labeled sodium-free or low-sodium. Canned vegetables, unless labeled sodium-free or low-sodium. [place] fries, pizza, tacos, and other fast foods.   Pickles, olives, ketchup, and other condiments, especially soy sauce, unless labeled sodium-free or

## 2023-08-29 NOTE — PROGRESS NOTES
Brandie Romo is a 67 y.o. female    had concerns including Hypertension, Coronary Artery Disease, and Cholesterol Problem. Vitals:    08/29/23 1129   BP: 136/84   Site: Left Upper Arm   Position: Sitting   Pulse: 74   SpO2: 99%   Weight: 181 lb (82.1 kg)   Height: 5' 5\" (1.651 m)        Chest pain No    SOB No    Dizziness No    Swelling No    Refills No    Covid Vaccinated yes      1. Have you been to the ER, urgent care clinic since your last visit? Hospitalized since your last visit? no    2. Have you seen or consulted any other health care providers outside of the [address] [place] [address] since your last visit? Include any pap smears or colon screening.  no

## 2023-09-06 RX ORDER — LINACLOTIDE 145 UG/1
CAPSULE, GELATIN COATED ORAL
Qty: 90 CAPSULE | Refills: 1 | Status: SHIPPED | OUTPATIENT
Start: 2023-09-06

## 2023-09-25 ENCOUNTER — TELEPHONE (OUTPATIENT)
Age: 73
End: 2023-09-25

## 2023-09-25 NOTE — TELEPHONE ENCOUNTER
9/25/2023 - lft msg on pt cell / only # to call me back to r/s her echo and follow up w/ Mikaela Wilburn - she missed her appt for the echo and her 9/26/2023 visit is useless without the echo done Addended by: Jami Winters on: 8/31/2023 05:04 PM     Modules accepted: Orders Spoke with mom and advised that the specialty pharmacy has been attempting to reach out. Informed mom of the callback number. Mom verbalized understanding.

## 2023-10-09 ENCOUNTER — HOSPITAL ENCOUNTER (OUTPATIENT)
Facility: HOSPITAL | Age: 73
Discharge: HOME | End: 2023-10-12
Payer: MEDICARE

## 2023-10-09 VITALS — BODY MASS INDEX: 30.16 KG/M2 | HEIGHT: 65 IN | WEIGHT: 181 LBS

## 2023-10-09 DIAGNOSIS — Z12.31 SCREENING MAMMOGRAM FOR BREAST CANCER: ICD-10-CM

## 2023-10-09 PROCEDURE — 77067 SCR MAMMO BI INCL CAD: CPT

## 2023-10-09 PROCEDURE — 77063 BREAST TOMOSYNTHESIS BI: CPT

## 2023-10-13 DIAGNOSIS — I10 ESSENTIAL (PRIMARY) HYPERTENSION: ICD-10-CM

## 2023-10-13 RX ORDER — AMLODIPINE BESYLATE 2.5 MG/1
2.5 TABLET ORAL DAILY
Qty: 30 TABLET | Refills: 1 | Status: SHIPPED | OUTPATIENT
Start: 2023-10-13

## 2023-10-13 RX ORDER — EZETIMIBE 10 MG/1
10 TABLET ORAL DAILY
Qty: 30 TABLET | Refills: 1 | Status: SHIPPED | OUTPATIENT
Start: 2023-10-13

## 2023-11-20 RX ORDER — ATORVASTATIN CALCIUM 40 MG/1
TABLET, FILM COATED ORAL
Qty: 90 TABLET | Refills: 0 | Status: SHIPPED | OUTPATIENT
Start: 2023-11-20

## 2023-11-20 NOTE — TELEPHONE ENCOUNTER
Refill per VO of Dr. Bateman  Last appt: 1/30/2023    Future Appointments   Date Time Provider Department Center   2/7/2024  8:40 AM Flo Pritchard MD [id number]       Requested Prescriptions     Pending Prescriptions Disp Refills    atorvastatin (LIPITOR) 40 MG tablet [Pharmacy Med Name: ATORVASTATIN CALCIUM 40 MG Tablet] 90 tablet 10     Sig: TAKE 1 TABLET EVERY DAY AT 5 PM   90 day supply sent to mail order pharmacy. Patient needs to schedule an appointment.

## 2023-11-21 ENCOUNTER — CLINICAL DOCUMENTATION (OUTPATIENT)
Age: 73
End: 2023-11-21

## 2023-11-21 ENCOUNTER — TELEPHONE (OUTPATIENT)
Age: 73
End: 2023-11-21

## 2023-11-28 ENCOUNTER — TELEPHONE (OUTPATIENT)
Age: 73
End: 2023-11-28

## 2023-11-28 NOTE — TELEPHONE ENCOUNTER
----- Message from Aj Porras sent at 11/27/2023  4:45 PM EST -----  Subject: Appointment Request    Reason for Call:     QUESTIONS    Reason for appointment request? Other - referral     Additional Information for Provider? patient lost the paper with the name   of the podiatrist on it. please contact patient with info  ---------------------------------------------------------------------------  --------------  Bhavesh [place] Richard  [phone]; OK to leave message on voicemail  ---------------------------------------------------------------------------  --------------  SCRIPT ANSWERS

## 2023-12-14 ENCOUNTER — HOSPITAL ENCOUNTER (OUTPATIENT)
Facility: HOSPITAL | Age: 73
Discharge: HOME | End: 2023-12-14
Payer: MEDICARE

## 2023-12-14 ENCOUNTER — TRANSCRIBE ORDERS (OUTPATIENT)
Facility: HOSPITAL | Age: 73
End: 2023-12-14

## 2023-12-14 DIAGNOSIS — S62.511A CLOSED DISPLACED FRACTURE OF PROXIMAL PHALANX OF RIGHT THUMB, INITIAL ENCOUNTER: Primary | ICD-10-CM

## 2023-12-14 DIAGNOSIS — S62.511A CLOSED DISPLACED FRACTURE OF PROXIMAL PHALANX OF RIGHT THUMB, INITIAL ENCOUNTER: ICD-10-CM

## 2023-12-14 LAB
EKG ATRIAL RATE: 74 BPM
EKG DIAGNOSIS: NORMAL
EKG P AXIS: 63 DEGREES
EKG P-R INTERVAL: 176 MS
EKG Q-T INTERVAL: 380 MS
EKG QRS DURATION: 96 MS
EKG QTC CALCULATION (BAZETT): 421 MS
EKG R AXIS: 15 DEGREES
EKG T AXIS: 16 DEGREES
EKG VENTRICULAR RATE: 74 BPM

## 2023-12-14 PROCEDURE — 93010 ELECTROCARDIOGRAM REPORT: CPT | Performed by: SPECIALIST

## 2023-12-14 PROCEDURE — 93005 ELECTROCARDIOGRAM TRACING: CPT

## 2023-12-15 ENCOUNTER — TELEPHONE (OUTPATIENT)
Age: 73
End: 2023-12-15

## 2023-12-15 NOTE — TELEPHONE ENCOUNTER
Fax received from [facility] [address] for clearance. Procedure: R thumb P1 fracture CRPP surgery scheduled 12/20/23 using axillary block w IV sedation anesthesia. Dr: Corinne Gaskin.  Sergey Ordoñez Jr  Medication requested to hold: ASA    Fax to: [phone]  Ph: [phone]

## 2024-01-14 DIAGNOSIS — I10 ESSENTIAL (PRIMARY) HYPERTENSION: ICD-10-CM

## 2024-01-15 RX ORDER — AMLODIPINE BESYLATE 2.5 MG/1
2.5 TABLET ORAL DAILY
Qty: 30 TABLET | Refills: 0 | Status: SHIPPED | OUTPATIENT
Start: 2024-01-15

## 2024-01-15 NOTE — TELEPHONE ENCOUNTER
Refill per VO of Dr. Bateman  Last appt: 8/29/2023    Future Appointments   Date Time Provider Department Center   2/7/2024  8:40 AM Flo Pritchard MD [facility]       Requested Prescriptions     Signed Prescriptions Disp Refills    amLODIPine (NORVASC) 2.5 MG tablet 30 tablet 0     Sig: TAKE 1 TABLET BY MOUTH DAILY NEEDS AN APPOINTMENT TO CONTINUE REFILLS     Authorizing Provider: GODWIN BARRY     Ordering User: BRITTNI DOUGLAS

## 2024-02-07 ENCOUNTER — OFFICE VISIT (OUTPATIENT)
Age: 74
End: 2024-02-07
Payer: MEDICARE

## 2024-02-07 VITALS
BODY MASS INDEX: 30.12 KG/M2 | TEMPERATURE: 96 F | HEIGHT: 65 IN | SYSTOLIC BLOOD PRESSURE: 120 MMHG | OXYGEN SATURATION: 100 % | HEART RATE: 74 BPM | DIASTOLIC BLOOD PRESSURE: 70 MMHG

## 2024-02-07 DIAGNOSIS — R41.89 COGNITIVE IMPAIRMENT: ICD-10-CM

## 2024-02-07 DIAGNOSIS — R68.89 OTHER GENERAL SYMPTOMS AND SIGNS: ICD-10-CM

## 2024-02-07 DIAGNOSIS — R41.89 COGNITIVE IMPAIRMENT: Primary | ICD-10-CM

## 2024-02-07 PROCEDURE — 3078F DIAST BP <80 MM HG: CPT | Performed by: PSYCHIATRY & NEUROLOGY

## 2024-02-07 PROCEDURE — 3074F SYST BP LT 130 MM HG: CPT | Performed by: PSYCHIATRY & NEUROLOGY

## 2024-02-07 PROCEDURE — 99204 OFFICE O/P NEW MOD 45 MIN: CPT | Performed by: PSYCHIATRY & NEUROLOGY

## 2024-02-07 PROCEDURE — 1123F ACP DISCUSS/DSCN MKR DOCD: CPT | Performed by: PSYCHIATRY & NEUROLOGY

## 2024-02-07 NOTE — PROGRESS NOTES
NEUROLOGY NEW PATIENT CONSULTATION      2/7/2024    RE: Renee Draper    REFERRED BY:  Gertrude Chowdhury APRN - NP    CHIEF COMPLAINT:  This is Renee Draper is a 73 y.o. female   who had concerns including New Patient and Memory Loss.    HPI:     For the past 4 yrs, patient noted memory issues described as problem remembering things, problem finding words. Still independent in ADLs.    Works as . Still driving.    (-) hallucinations  (+) depression after losing son and isolates herself    Lives by herself.    Review of Systems  (-) fever  (-) rash  All other systems reviewed and are negative    PMH  Past Medical History:   Diagnosis Date    Arthritis     Asthma     in 20s - no longer a problem    CAD (coronary artery disease)     NSTEMI with PCI ([facility])  to RCA 5/12/17     CKD (chronic kidney disease) stage 3, GFR 30-59 ml/min (HCC)     Depression     Dyslipidemia     H/O heart artery stent     x1    Hypertension     Ill-defined condition     Hepatitis C    Knee injury     Liver disease     hep c--had treatment, states no longer present    Nausea & vomiting     Seizures (HCC)     Smoker        Social Hx  Social History     Socioeconomic History    Marital status: Single   Tobacco Use    Smoking status: Some Days     Current packs/day: 0.10     Types: Cigarettes    Smokeless tobacco: Never   Substance and Sexual Activity    Alcohol use: Yes    Drug use: No     Social Determinants of Health     Physical Activity: Sufficiently Active (8/28/2023)    Exercise Vital Sign     Days of Exercise per Week: 5 days     Minutes of Exercise per Session: 30 min       Family Hx  Family History   Problem Relation Age of Onset    Elevated Lipids Mother     Cancer Father         LUNG AND THROAT    Parkinson's Disease Father     Breast Cancer Paternal Aunt        ALLERGIES  Allergies   Allergen Reactions    Penicillins      Other reaction(s): Unknown (comments)       CURRENT MEDS  Current Outpatient

## 2024-02-12 RX ORDER — EZETIMIBE 10 MG/1
10 TABLET ORAL DAILY
Qty: 30 TABLET | Refills: 0 | Status: SHIPPED | OUTPATIENT
Start: 2024-02-12

## 2024-02-12 NOTE — TELEPHONE ENCOUNTER
Refill per VO of Dr. Bateman  Last appt: 8/29/2023    No future appointments.    Requested Prescriptions     Signed Prescriptions Disp Refills    ezetimibe (ZETIA) 10 MG tablet 30 tablet 0     Sig: TAKE 1 TABLET BY MOUTH DAILY NEEDS APPOINTMENT WITH CARDIOLOGY TO CONTINUE REFILLS     Authorizing Provider: OLIVA BATEMAN     Ordering User: BRITTNI DOUGLAS

## 2024-02-14 LAB
FOLATE SERPL-MCNC: 10.6 NG/ML
TSH SERPL DL<=0.005 MIU/L-ACNC: 1.63 UIU/ML (ref 0.45–4.5)
VIT B12 SERPL-MCNC: 680 PG/ML (ref 232–1245)

## 2024-02-20 DIAGNOSIS — I10 ESSENTIAL (PRIMARY) HYPERTENSION: ICD-10-CM

## 2024-02-20 RX ORDER — AMLODIPINE BESYLATE 2.5 MG/1
2.5 TABLET ORAL DAILY
Qty: 30 TABLET | Refills: 3 | OUTPATIENT
Start: 2024-02-20

## 2024-02-20 NOTE — TELEPHONE ENCOUNTER
Refill per VO of Dr. Bateman  Last appt: 8/29/2023    No future appointments.    Requested Prescriptions     Refused Prescriptions Disp Refills    amLODIPine (NORVASC) 2.5 MG tablet [Pharmacy Med Name: AMLODIPINE BESYLATE 2.5 MG TAB] 30 tablet 3     Sig: TAKE 1 TABLET BY MOUTH DAILY NEEDS AN APPOINTMENT TO CONTINUE REFILLS     Refused By: BRITTNI DOUGLAS     Reason for Refusal: Patient needs an appointment

## 2024-03-10 DIAGNOSIS — I10 ESSENTIAL (PRIMARY) HYPERTENSION: ICD-10-CM

## 2024-03-11 RX ORDER — AMLODIPINE BESYLATE 2.5 MG/1
2.5 TABLET ORAL DAILY
Qty: 30 TABLET | Refills: 3 | Status: SHIPPED | OUTPATIENT
Start: 2024-03-11

## 2024-03-11 NOTE — TELEPHONE ENCOUNTER
Refill per VO of Dr. Bateman  Last appt: 8/29/2023    Future Appointments   Date Time Provider Department Center   2/24/2025  1:40 PM Kera Castellon PSYD [id number]       Requested Prescriptions     Signed Prescriptions Disp Refills    amLODIPine (NORVASC) 2.5 MG tablet 30 tablet 3     Sig: TAKE 1 TABLET BY MOUTH DAILY NEEDS AN APPOINTMENT TO CONTINUE REFILLS     Authorizing Provider: OLIVA BATEMAN     Ordering User: BRITTNI DOUGLAS

## 2024-04-22 ENCOUNTER — OFFICE VISIT (OUTPATIENT)
Age: 74
End: 2024-04-22
Payer: MEDICARE

## 2024-04-22 VITALS
HEART RATE: 76 BPM | SYSTOLIC BLOOD PRESSURE: 118 MMHG | OXYGEN SATURATION: 99 % | DIASTOLIC BLOOD PRESSURE: 76 MMHG | BODY MASS INDEX: 29.46 KG/M2 | HEIGHT: 65 IN | WEIGHT: 176.8 LBS | RESPIRATION RATE: 18 BRPM

## 2024-04-22 DIAGNOSIS — I25.118 ATHEROSCLEROTIC HEART DISEASE OF NATIVE CORONARY ARTERY WITH OTHER FORMS OF ANGINA PECTORIS (HCC): ICD-10-CM

## 2024-04-22 DIAGNOSIS — E78.5 HYPERLIPIDEMIA, UNSPECIFIED HYPERLIPIDEMIA TYPE: Primary | ICD-10-CM

## 2024-04-22 DIAGNOSIS — I10 ESSENTIAL (PRIMARY) HYPERTENSION: ICD-10-CM

## 2024-04-22 PROCEDURE — 1123F ACP DISCUSS/DSCN MKR DOCD: CPT

## 2024-04-22 PROCEDURE — 99214 OFFICE O/P EST MOD 30 MIN: CPT

## 2024-04-22 PROCEDURE — 3078F DIAST BP <80 MM HG: CPT

## 2024-04-22 PROCEDURE — 3074F SYST BP LT 130 MM HG: CPT

## 2024-04-22 RX ORDER — NEBIVOLOL 10 MG/1
10 TABLET ORAL DAILY
Qty: 90 TABLET | Refills: 3 | Status: SHIPPED | OUTPATIENT
Start: 2024-04-22

## 2024-04-22 RX ORDER — AMLODIPINE BESYLATE 2.5 MG/1
2.5 TABLET ORAL DAILY
Qty: 90 TABLET | Refills: 3 | Status: SHIPPED | OUTPATIENT
Start: 2024-04-22

## 2024-04-22 RX ORDER — ATORVASTATIN CALCIUM 40 MG/1
TABLET, FILM COATED ORAL
Qty: 90 TABLET | Refills: 3 | Status: SHIPPED | OUTPATIENT
Start: 2024-04-22

## 2024-04-22 RX ORDER — EZETIMIBE 10 MG/1
10 TABLET ORAL DAILY
Qty: 90 TABLET | Refills: 3 | Status: SHIPPED | OUTPATIENT
Start: 2024-04-22

## 2024-04-22 NOTE — PROGRESS NOTES
had concerns including Hypertension, Hyperlipidemia, and Coronary Artery Disease.    Vitals:    04/22/24 1106   BP: 118/76   Site: Left Upper Arm   Position: Sitting   Pulse: 76   Resp: 18   SpO2: 99%   Weight: 80.2 kg (176 lb 12.8 oz)   Height: 1.651 m (5' 5\")        Chest pain No    Refills No        1. Have you been to the ER, urgent care clinic since your last visit? No       Hospitalized since your last visit? No       Where?    Urgent Care   Date? February- gladys thumb  
    08/22/2023    K 5.1 08/22/2023     08/22/2023    CO2 20 08/22/2023    BUN 13 08/22/2023    CREATININE 1.13 (H) 08/22/2023    GLUCOSE 98 08/22/2023    CALCIUM 9.6 08/22/2023    PROT 7.6 01/26/2023    BILITOT 0.4 01/26/2023    ALKPHOS 82 01/26/2023    AST 32 01/26/2023    ALT 15 01/26/2023    LABGLOM 51 (L) 08/22/2023    GFRAA 42 (L) 03/21/2022    AGRATIO 1.6 01/26/2023    GLOB 3.8 03/21/2022       Lab Results   Component Value Date    WBC 6.5 02/14/2022    HGB 11.3 02/14/2022    HCT 35.6 02/14/2022    MCV 97 02/14/2022     (L) 02/14/2022     Cholesterol   Date Value Ref Range Status   08/22/2023 127 100 - 199 mg/dL Final     Triglycerides   Date Value Ref Range Status   08/22/2023 66 0 - 149 mg/dL Final     HDL   Date Value Ref Range Status   08/22/2023 47 >39 mg/dL Final     LDL Calculated   Date Value Ref Range Status   08/22/2023 66 0 - 99 mg/dL Final        CARDIAC DIAGNOSTICS:     Cardiac Evaluation Includes:  I reviewed the test results below.    Echo 5/8/17 - LVEF 55-60%, grade 1 diastology   Exercise Cardiolite 5/8/17 - walked 7:31 (10.1 METS), ischemic EKG changes (2 mm inferior ST depression).  + LINDA but no CP. Small, mild, partially reversible defects in anteroseptal and inferior walls.  SSS = 3, SRS = 1, SDS = 2.   Dilated LV with  LVEF 37%   Carotid Dopplers 5/8/17 - mild 10-49% stenosis bilat        S/p right femoral thrombectomy and bovine patch plasty of right CFA/PFA 5/17/17 (s/p cath 5/12/17)        EKG 5/29/17 - NSR, LVH   Labs 5/29/17 - Hgb 10.9, LFT's OK       NSTEMI with PCI (SARAI)  to RCA 5/12/17       Cath 5/12/17 - LM 20-30% narrowing, LAD calcified however good lumen, LCX calcified but OK lumen, RCA with 95% ostial stenosis and 100% proximal occlusion.  Had PCI of RCA with SARAI.  LVEF 50-55% with inferobasal HK       Echo 10/5/20 - LVEF 62%, grade 1 diastology       Carotid Doppler 4/11/22 - < 50% stenosis bilat            EKG 4/4/17 - NSR, LVH   EKG 8/3/20 - sinus

## 2024-04-23 ENCOUNTER — ANCILLARY PROCEDURE (OUTPATIENT)
Age: 74
End: 2024-04-23
Payer: MEDICARE

## 2024-04-23 VITALS
HEART RATE: 77 BPM | WEIGHT: 176 LBS | SYSTOLIC BLOOD PRESSURE: 118 MMHG | HEIGHT: 65 IN | BODY MASS INDEX: 29.32 KG/M2 | DIASTOLIC BLOOD PRESSURE: 60 MMHG

## 2024-04-23 DIAGNOSIS — I10 ESSENTIAL (PRIMARY) HYPERTENSION: ICD-10-CM

## 2024-04-23 DIAGNOSIS — I25.118 ATHEROSCLEROTIC HEART DISEASE OF NATIVE CORONARY ARTERY WITH OTHER FORMS OF ANGINA PECTORIS (HCC): ICD-10-CM

## 2024-04-23 DIAGNOSIS — E78.5 HYPERLIPIDEMIA, UNSPECIFIED HYPERLIPIDEMIA TYPE: ICD-10-CM

## 2024-04-23 DIAGNOSIS — R06.09 OTHER FORMS OF DYSPNEA: ICD-10-CM

## 2024-04-23 LAB
ECHO AO ASC DIAM: 3.2 CM
ECHO AO ASCENDING AORTA INDEX: 1.71 CM/M2
ECHO AO ROOT DIAM: 3 CM
ECHO AO ROOT INDEX: 1.6 CM/M2
ECHO AV AREA PEAK VELOCITY: 2.3 CM2
ECHO AV AREA VTI: 2.5 CM2
ECHO AV AREA/BSA PEAK VELOCITY: 1.2 CM2/M2
ECHO AV AREA/BSA VTI: 1.3 CM2/M2
ECHO AV MEAN GRADIENT: 4 MMHG
ECHO AV MEAN VELOCITY: 0.9 M/S
ECHO AV PEAK GRADIENT: 8 MMHG
ECHO AV PEAK VELOCITY: 1.4 M/S
ECHO AV VELOCITY RATIO: 0.71
ECHO AV VTI: 25.9 CM
ECHO BSA: 1.91 M2
ECHO EST RA PRESSURE: 3 MMHG
ECHO LA DIAMETER INDEX: 1.93 CM/M2
ECHO LA DIAMETER: 3.6 CM
ECHO LA TO AORTIC ROOT RATIO: 1.2
ECHO LA VOL A-L A2C: 70 ML (ref 22–52)
ECHO LA VOL A-L A4C: 53 ML (ref 22–52)
ECHO LA VOL MOD A2C: 66 ML (ref 22–52)
ECHO LA VOL MOD A4C: 51 ML (ref 22–52)
ECHO LA VOLUME AREA LENGTH: 63 ML
ECHO LA VOLUME INDEX A-L A2C: 37 ML/M2 (ref 16–34)
ECHO LA VOLUME INDEX A-L A4C: 28 ML/M2 (ref 16–34)
ECHO LA VOLUME INDEX AREA LENGTH: 34 ML/M2 (ref 16–34)
ECHO LA VOLUME INDEX MOD A2C: 35 ML/M2 (ref 16–34)
ECHO LA VOLUME INDEX MOD A4C: 27 ML/M2 (ref 16–34)
ECHO LV E' LATERAL VELOCITY: 9 CM/S
ECHO LV E' SEPTAL VELOCITY: 7 CM/S
ECHO LV EDV A2C: 105 ML
ECHO LV EDV A4C: 84 ML
ECHO LV EDV BP: 96 ML (ref 56–104)
ECHO LV EDV INDEX A4C: 45 ML/M2
ECHO LV EDV INDEX BP: 51 ML/M2
ECHO LV EDV NDEX A2C: 56 ML/M2
ECHO LV EJECTION FRACTION A2C: 61 %
ECHO LV EJECTION FRACTION A4C: 66 %
ECHO LV EJECTION FRACTION BIPLANE: 63 % (ref 55–100)
ECHO LV ESV A2C: 41 ML
ECHO LV ESV A4C: 28 ML
ECHO LV ESV BP: 35 ML (ref 19–49)
ECHO LV ESV INDEX A2C: 22 ML/M2
ECHO LV ESV INDEX A4C: 15 ML/M2
ECHO LV ESV INDEX BP: 19 ML/M2
ECHO LV FRACTIONAL SHORTENING: 33 % (ref 28–44)
ECHO LV INTERNAL DIMENSION DIASTOLE INDEX: 2.25 CM/M2
ECHO LV INTERNAL DIMENSION DIASTOLIC: 4.2 CM (ref 3.9–5.3)
ECHO LV INTERNAL DIMENSION SYSTOLIC INDEX: 1.5 CM/M2
ECHO LV INTERNAL DIMENSION SYSTOLIC: 2.8 CM
ECHO LV IVSD: 0.8 CM (ref 0.6–0.9)
ECHO LV MASS 2D: 101.3 G (ref 67–162)
ECHO LV MASS INDEX 2D: 54.2 G/M2 (ref 43–95)
ECHO LV POSTERIOR WALL DIASTOLIC: 0.8 CM (ref 0.6–0.9)
ECHO LV RELATIVE WALL THICKNESS RATIO: 0.38
ECHO LVOT AREA: 3.5 CM2
ECHO LVOT AV VTI INDEX: 0.76
ECHO LVOT DIAM: 2.1 CM
ECHO LVOT MEAN GRADIENT: 2 MMHG
ECHO LVOT PEAK GRADIENT: 4 MMHG
ECHO LVOT PEAK VELOCITY: 1 M/S
ECHO LVOT STROKE VOLUME INDEX: 36.5 ML/M2
ECHO LVOT SV: 68.2 ML
ECHO LVOT VTI: 19.7 CM
ECHO MV A VELOCITY: 0.86 M/S
ECHO MV AREA PHT: 2.8 CM2
ECHO MV AREA VTI: 4 CM2
ECHO MV E DECELERATION TIME (DT): 268.9 MS
ECHO MV E VELOCITY: 0.69 M/S
ECHO MV E/A RATIO: 0.8
ECHO MV E/E' LATERAL: 7.67
ECHO MV E/E' RATIO (AVERAGED): 8.76
ECHO MV LVOT VTI INDEX: 0.87
ECHO MV MAX VELOCITY: 0.9 M/S
ECHO MV MEAN GRADIENT: 1 MMHG
ECHO MV MEAN VELOCITY: 0.6 M/S
ECHO MV PEAK GRADIENT: 3 MMHG
ECHO MV PRESSURE HALF TIME (PHT): 78 MS
ECHO MV VTI: 17.2 CM
ECHO RA AREA 4C: 14.5 CM2
ECHO RA END SYSTOLIC VOLUME APICAL 4 CHAMBER INDEX BSA: 17 ML/M2
ECHO RA VOLUME: 32 ML
ECHO RIGHT VENTRICULAR SYSTOLIC PRESSURE (RVSP): 24 MMHG
ECHO RV INTERNAL DIMENSION: 3.7 CM
ECHO RV TAPSE: 1.9 CM (ref 1.7–?)
ECHO TV REGURGITANT MAX VELOCITY: 2.29 M/S
ECHO TV REGURGITANT PEAK GRADIENT: 21 MMHG

## 2024-04-23 PROCEDURE — 93306 TTE W/DOPPLER COMPLETE: CPT | Performed by: SPECIALIST

## 2024-04-24 NOTE — RESULT ENCOUNTER NOTE
Dear Ms. Draper,  Good News!  Your echo is stable. Your heart function is normal.   Please let me know if you have questions.  Best Regards,  CRISTAL Dudley NP

## 2024-05-07 ENCOUNTER — TELEPHONE (OUTPATIENT)
Age: 74
End: 2024-05-07

## 2024-05-07 NOTE — TELEPHONE ENCOUNTER
Per MELIDA Amin NP: \"Dear Ms. Draper,  Good News!  Your echo (4/23/24) is stable. Your heart function is normal.  Please let me know if you have questions.  Best Regards,  CRISTAL Dudley - NP\"

## 2024-05-28 ENCOUNTER — HOSPITAL ENCOUNTER (OUTPATIENT)
Facility: HOSPITAL | Age: 74
Discharge: HOME | End: 2024-05-31
Attending: PODIATRIST
Payer: MEDICARE

## 2024-05-28 ENCOUNTER — OFFICE VISIT (OUTPATIENT)
Age: 74
End: 2024-05-28
Payer: MEDICARE

## 2024-05-28 VITALS
RESPIRATION RATE: 18 BRPM | BODY MASS INDEX: 29.06 KG/M2 | TEMPERATURE: 97.8 F | HEIGHT: 65 IN | SYSTOLIC BLOOD PRESSURE: 162 MMHG | HEART RATE: 76 BPM | OXYGEN SATURATION: 97 % | DIASTOLIC BLOOD PRESSURE: 84 MMHG | WEIGHT: 174.4 LBS

## 2024-05-28 DIAGNOSIS — M20.5X9 OVERRIDING TOE, UNSPECIFIED LATERALITY: ICD-10-CM

## 2024-05-28 DIAGNOSIS — M79.671 RIGHT FOOT PAIN: Primary | ICD-10-CM

## 2024-05-28 DIAGNOSIS — M79.672 LEFT FOOT PAIN: ICD-10-CM

## 2024-05-28 DIAGNOSIS — M79.671 RIGHT FOOT PAIN: ICD-10-CM

## 2024-05-28 PROCEDURE — 99204 OFFICE O/P NEW MOD 45 MIN: CPT | Performed by: PODIATRIST

## 2024-05-28 PROCEDURE — 1123F ACP DISCUSS/DSCN MKR DOCD: CPT | Performed by: PODIATRIST

## 2024-05-28 PROCEDURE — 3078F DIAST BP <80 MM HG: CPT | Performed by: PODIATRIST

## 2024-05-28 PROCEDURE — 73630 X-RAY EXAM OF FOOT: CPT

## 2024-05-28 PROCEDURE — 3077F SYST BP >= 140 MM HG: CPT | Performed by: PODIATRIST

## 2024-05-28 ASSESSMENT — PATIENT HEALTH QUESTIONNAIRE - PHQ9
3. TROUBLE FALLING OR STAYING ASLEEP: NOT AT ALL
SUM OF ALL RESPONSES TO PHQ QUESTIONS 1-9: 0
10. IF YOU CHECKED OFF ANY PROBLEMS, HOW DIFFICULT HAVE THESE PROBLEMS MADE IT FOR YOU TO DO YOUR WORK, TAKE CARE OF THINGS AT HOME, OR GET ALONG WITH OTHER PEOPLE: NOT DIFFICULT AT ALL
1. LITTLE INTEREST OR PLEASURE IN DOING THINGS: NOT AT ALL
SUM OF ALL RESPONSES TO PHQ9 QUESTIONS 1 & 2: 0
6. FEELING BAD ABOUT YOURSELF - OR THAT YOU ARE A FAILURE OR HAVE LET YOURSELF OR YOUR FAMILY DOWN: NOT AT ALL
9. THOUGHTS THAT YOU WOULD BE BETTER OFF DEAD, OR OF HURTING YOURSELF: NOT AT ALL
SUM OF ALL RESPONSES TO PHQ QUESTIONS 1-9: 0
8. MOVING OR SPEAKING SO SLOWLY THAT OTHER PEOPLE COULD HAVE NOTICED. OR THE OPPOSITE, BEING SO FIGETY OR RESTLESS THAT YOU HAVE BEEN MOVING AROUND A LOT MORE THAN USUAL: NOT AT ALL
SUM OF ALL RESPONSES TO PHQ QUESTIONS 1-9: 0
2. FEELING DOWN, DEPRESSED OR HOPELESS: NOT AT ALL
SUM OF ALL RESPONSES TO PHQ QUESTIONS 1-9: 0
SUM OF ALL RESPONSES TO PHQ9 QUESTIONS 1 & 2: 0
5. POOR APPETITE OR OVEREATING: NOT AT ALL
4. FEELING TIRED OR HAVING LITTLE ENERGY: NOT AT ALL
7. TROUBLE CONCENTRATING ON THINGS, SUCH AS READING THE NEWSPAPER OR WATCHING TELEVISION: NOT AT ALL
1. LITTLE INTEREST OR PLEASURE IN DOING THINGS: NOT AT ALL
2. FEELING DOWN, DEPRESSED OR HOPELESS: NOT AT ALL
SUM OF ALL RESPONSES TO PHQ QUESTIONS 1-9: 0
SUM OF ALL RESPONSES TO PHQ QUESTIONS 1-9: 0

## 2024-05-28 NOTE — PROGRESS NOTES
Identified pt with two pt identifiers (name and ). Reviewed chart in preparation for visit and have obtained necessary documentation.    Renee Draper is a 73 y.o. female  Chief Complaint   Patient presents with    New Patient     Patient here due to her toes crossing over.      BP (!) 162/84 (Site: Right Upper Arm, Position: Sitting, Cuff Size: Small Adult)   Pulse 76   Temp 97.8 °F (36.6 °C) (Oral)   Resp 18   Ht 1.651 m (5' 5\")   LMP  (LMP Unknown)   SpO2 97%   BMI 29.29 kg/m²     1. Have you been to the ER, urgent care clinic since your last visit?  Hospitalized since your last visit?no    2. Have you seen or consulted any other health care providers outside of the [facility] since your last visit?  Include any pap smears or colon screening. No    Patient and provider made aware of elevated BP x2. Patient asymptomatic. Patient reminded to monitor BP, continue to take BP medications if prescribed, and follow up with PCP/Cardiologist.  Patient expressed understanding and agreement.       Patient forgot to take BP meds and had some coffee this morning.

## 2024-06-21 NOTE — PROGRESS NOTES
[facility] PODIATRY & FOOT SURGERY      Subjective:         Patient is a 73 y.o. female who is being seen as a new pt for pain to both feet.  Patient states she has bilateral crossover toes.  States she has seen multiple providers in the past for this issue, no resolution has been found.  She denies any overt trauma.  Denies any prescan or local/systemic signs infection.  States pain rest level of 4 out of 10 and is exacerbated with activity/close toed shoes.  Denies any recent diagnostic testing to interrogate her concerns.  Denies any other lower extremity complaints      Past Medical History:   Diagnosis Date    Arthritis     Asthma     in 20s - no longer a problem    CAD (coronary artery disease)     NSTEMI with PCI (SARAI)  to RCA 5/12/17     CKD (chronic kidney disease) stage 3, GFR 30-59 ml/min (HCC)     Depression     Dyslipidemia     H/O heart artery stent     x1    Hypertension     Ill-defined condition     Hepatitis C    Knee injury     Liver disease     hep c--had treatment, states no longer present    Nausea & vomiting     Seizures (HCC)     Smoker      Past Surgical History:   Procedure Laterality Date    COLONOSCOPY N/A 1/14/2019    COLONOSCOPY performed by Aki Brown MD at [facility] ENDOSCOPY    COLONOSCOPY  2013    CORONARY ANGIOPLASTY WITH STENT PLACEMENT      x1    ORTHOPEDIC SURGERY Bilateral     foot surgery    VASCULAR SURGERY      left leg varicose vein stripping       Family History   Problem Relation Age of Onset    Elevated Lipids Mother     Cancer Father         LUNG AND THROAT    Parkinson's Disease Father     Breast Cancer Paternal Aunt       Social History     Tobacco Use    Smoking status: Some Days     Current packs/day: 0.10     Average packs/day: 0.1 packs/day for 40.5 years (4.0 ttl pk-yrs)     Types: Cigarettes     Start date: 1/1/1984    Smokeless tobacco: Never   Substance Use Topics    Alcohol use: Yes     Allergies   Allergen Reactions

## 2024-10-07 ENCOUNTER — TELEPHONE (OUTPATIENT)
Age: 74
End: 2024-10-07

## 2024-10-07 RX ORDER — NEBIVOLOL 10 MG/1
10 TABLET ORAL DAILY
Qty: 90 TABLET | Refills: 1 | Status: SHIPPED | OUTPATIENT
Start: 2024-10-07

## 2024-10-07 NOTE — TELEPHONE ENCOUNTER
Refill per VO of Dr. Bateman  Last appt: 4/22/24    Future Appointments   Date Time Provider Department Center   11/4/2024 11:00 AM Regi Bateman MD [facility] AMB   2/24/2025  1:40 PM Kera Castellon PSYD [id number] BS AMB       Requested Prescriptions     Signed Prescriptions Disp Refills    nebivolol (BYSTOLIC) 10 MG tablet 90 tablet 1     Sig: Take 1 tablet by mouth daily     Authorizing Provider: REGI BATEMAN     Ordering User: FOREIGN WONG

## 2024-10-07 NOTE — TELEPHONE ENCOUNTER
Patient is calling because she needs a new prescription sent to Nebivolol 10 mg.Pharmacy is confirmed..    [phone] patient

## 2024-10-22 ENCOUNTER — TELEPHONE (OUTPATIENT)
Age: 74
End: 2024-10-22

## 2024-10-22 NOTE — TELEPHONE ENCOUNTER
Patient stated she would like to be seen before 2024 is over  If possible  , because her appointment was cancelled for 11/4/24    If any other cancellation for her to come in sooner please give her a call back        Contact Information  [phone] (Home Phone

## 2024-10-29 ENCOUNTER — TRANSCRIBE ORDERS (OUTPATIENT)
Facility: HOSPITAL | Age: 74
End: 2024-10-29

## 2024-10-29 DIAGNOSIS — Z12.31 ENCOUNTER FOR SCREENING MAMMOGRAM FOR MALIGNANT NEOPLASM OF BREAST: Primary | ICD-10-CM

## 2024-11-05 ENCOUNTER — OFFICE VISIT (OUTPATIENT)
Age: 74
End: 2024-11-05
Payer: MEDICARE

## 2024-11-05 VITALS
OXYGEN SATURATION: 98 % | HEART RATE: 80 BPM | DIASTOLIC BLOOD PRESSURE: 70 MMHG | HEIGHT: 65 IN | SYSTOLIC BLOOD PRESSURE: 190 MMHG | WEIGHT: 172 LBS | BODY MASS INDEX: 28.66 KG/M2

## 2024-11-05 DIAGNOSIS — E78.5 DYSLIPIDEMIA: ICD-10-CM

## 2024-11-05 DIAGNOSIS — I10 ESSENTIAL (PRIMARY) HYPERTENSION: ICD-10-CM

## 2024-11-05 DIAGNOSIS — N18.30 STAGE 3 CHRONIC KIDNEY DISEASE, UNSPECIFIED WHETHER STAGE 3A OR 3B CKD (HCC): ICD-10-CM

## 2024-11-05 DIAGNOSIS — I25.118 CORONARY ARTERY DISEASE OF NATIVE ARTERY OF NATIVE HEART WITH STABLE ANGINA PECTORIS (HCC): Primary | ICD-10-CM

## 2024-11-05 PROCEDURE — 99214 OFFICE O/P EST MOD 30 MIN: CPT | Performed by: SPECIALIST

## 2024-11-05 RX ORDER — NEBIVOLOL 20 MG/1
20 TABLET ORAL DAILY
Qty: 90 TABLET | Refills: 3 | Status: SHIPPED | OUTPATIENT
Start: 2024-11-05

## 2024-11-05 RX ORDER — AMLODIPINE BESYLATE 5 MG/1
5 TABLET ORAL DAILY
Qty: 90 TABLET | Refills: 3 | Status: SHIPPED | OUTPATIENT
Start: 2024-11-05

## 2024-11-05 NOTE — PATIENT INSTRUCTIONS
[email or web address] and enter H967 to learn more about \"DASH Diet: Care Instructions.\"  Current as of: September 20, 2023  Content Version: 14.2  © 2024 [employer].   Care instructions adapted under license by [employer]. If you have questions about a medical condition or this instruction, always ask your healthcare professional. [employer] disclaims any warranty or liability for your use of this information.

## 2024-11-05 NOTE — PROGRESS NOTES
Chief Complaint   Patient presents with    Hypertension    Cholesterol Problem    Coronary Artery Disease     Vitals:    11/05/24 0845 11/05/24 0850   BP: (!) 190/70 (!) 190/70   Site: Left Upper Arm    Position: Sitting    Cuff Size: Medium Adult    Pulse: 80    SpO2: 98%    Weight: 78 kg (172 lb)    Height: 1.651 m (5' 5\")       BP (!) 190/70   Pulse 80   Ht 1.651 m (5' 5\")   Wt 78 kg (172 lb)   LMP  (LMP Unknown)   SpO2 98%   BMI 28.62 kg/m²      Pt states she has headache   
08/22/2023    CO2 20 08/22/2023    BUN 13 08/22/2023    CREATININE 1.13 (H) 08/22/2023    GLUCOSE 98 08/22/2023    CALCIUM 9.6 08/22/2023    BILITOT 0.4 01/26/2023    ALKPHOS 82 01/26/2023    AST 32 01/26/2023    ALT 15 01/26/2023    LABGLOM 51 (L) 08/22/2023    GFRAA 42 (L) 03/21/2022    AGRATIO 1.6 01/26/2023    GLOB 3.8 03/21/2022     Lab Results   Component Value Date    WBC 6.5 02/14/2022    HGB 11.3 02/14/2022    HCT 35.6 02/14/2022    MCV 97 02/14/2022     (L) 02/14/2022     Lab Results   Component Value Date    CHOL 127 08/22/2023    TRIG 66 08/22/2023    HDL 47 08/22/2023    LDL 66 08/22/2023    VLDL 14 08/22/2023         CARDIAC DIAGNOSTICS:     Cardiac Evaluation Includes:  I reviewed the test results below.    Echo 5/8/17 - LVEF 55-60%, grade 1 diastology   Exercise Cardiolite 5/8/17 - walked 7:31 (10.1 METS), ischemic EKG changes (2 mm inferior ST depression).  + LINDA but no CP. Small, mild, partially reversible defects in anteroseptal and inferior walls.  SSS = 3, SRS = 1, SDS = 2.   Dilated LV with  LVEF 37%   Carotid Dopplers 5/8/17 - mild 10-49% stenosis bilat        S/p right femoral thrombectomy and bovine patch plasty of right CFA/PFA 5/17/17 (s/p cath 5/12/17)        EKG 5/29/17 - NSR, LVH   Labs 5/29/17 - Hgb 10.9, LFT's OK       NSTEMI with PCI (SARAI)  to RCA 5/12/17       Cath 5/12/17 - LM 20-30% narrowing, LAD calcified however good lumen, LCX calcified but OK lumen, RCA with 95% ostial stenosis and 100% proximal occlusion.  Had PCI of RCA with SARAI.  LVEF 50-55% with inferobasal HK       Echo 10/5/20 - LVEF 62%, grade 1 diastology       Carotid Doppler 4/11/22 - < 50% stenosis bilat       Echo 4/23/24 - LVEF 63%, AV sclerosis            EKG 4/4/17 - NSR, LVH   EKG 8/3/20 - sinus morgan, septal Q's    EKG 10/24/22 - NSR, septal Q waves   EKG 12/14/23 - NSR, LVH       ASSESSMENT AND PLAN:     Assessment and Plan:  1)  CAD   - NSTEMI with PCI (SARAI)  to RCA 5/12/17    - Cath 5/12/17 - LM

## 2024-11-12 ENCOUNTER — HOSPITAL ENCOUNTER (OUTPATIENT)
Facility: HOSPITAL | Age: 74
Discharge: HOME | End: 2024-11-15
Payer: MEDICARE

## 2024-11-12 DIAGNOSIS — Z12.31 ENCOUNTER FOR SCREENING MAMMOGRAM FOR MALIGNANT NEOPLASM OF BREAST: ICD-10-CM

## 2024-11-12 PROCEDURE — 77063 BREAST TOMOSYNTHESIS BI: CPT

## 2024-11-13 LAB
ALBUMIN SERPL-MCNC: 4.5 G/DL (ref 3.8–4.8)
ALP SERPL-CCNC: 75 IU/L (ref 44–121)
ALT SERPL-CCNC: 17 IU/L (ref 0–32)
AST SERPL-CCNC: 30 IU/L (ref 0–40)
BASOPHILS # BLD AUTO: 0 X10E3/UL (ref 0–0.2)
BASOPHILS NFR BLD AUTO: 0 %
BILIRUB SERPL-MCNC: 0.4 MG/DL (ref 0–1.2)
BUN SERPL-MCNC: 25 MG/DL (ref 8–27)
BUN/CREAT SERPL: 19 (ref 12–28)
CALCIUM SERPL-MCNC: 9.4 MG/DL (ref 8.7–10.3)
CHLORIDE SERPL-SCNC: 104 MMOL/L (ref 96–106)
CHOLEST SERPL-MCNC: 137 MG/DL (ref 100–199)
CO2 SERPL-SCNC: 23 MMOL/L (ref 20–29)
CREAT SERPL-MCNC: 1.32 MG/DL (ref 0.57–1)
EGFRCR SERPLBLD CKD-EPI 2021: 42 ML/MIN/1.73
EOSINOPHIL # BLD AUTO: 1.1 X10E3/UL (ref 0–0.4)
EOSINOPHIL NFR BLD AUTO: 15 %
ERYTHROCYTE [DISTWIDTH] IN BLOOD BY AUTOMATED COUNT: 12.9 % (ref 11.7–15.4)
GLOBULIN SER CALC-MCNC: 2.7 G/DL (ref 1.5–4.5)
GLUCOSE SERPL-MCNC: 122 MG/DL (ref 70–99)
HCT VFR BLD AUTO: 38.9 % (ref 34–46.6)
HDL SERPL QN: 9.5 NM
HDL SERPL-SCNC: 34 UMOL/L
HDLC SERPL-MCNC: 51 MG/DL
HGB BLD-MCNC: 12.4 G/DL (ref 11.1–15.9)
HLD.LARGE SERPL-SCNC: 6.9 UMOL/L
IMM GRANULOCYTES # BLD AUTO: 0 X10E3/UL (ref 0–0.1)
IMM GRANULOCYTES NFR BLD AUTO: 0 %
LDL SERPL QN: 21.2 NM
LDL SERPL QN: 21.2 NM
LDL SERPL-SCNC: 822 NMOL/L
LDL SMALL SERPL-SCNC: 425 NMOL/L
LDL SMALL SERPL-SCNC: 425 NMOL/L
LDLC SERPL CALC-MCNC: 67 MG/DL (ref 0–99)
LP-IR SCORE SERPL: 56
LYMPHOCYTES # BLD AUTO: 1.9 X10E3/UL (ref 0.7–3.1)
LYMPHOCYTES NFR BLD AUTO: 26 %
MAGNESIUM SERPL-MCNC: 2.1 MG/DL (ref 1.6–2.3)
MCH RBC QN AUTO: 31.2 PG (ref 26.6–33)
MCHC RBC AUTO-ENTMCNC: 31.9 G/DL (ref 31.5–35.7)
MCV RBC AUTO: 98 FL (ref 79–97)
MONOCYTES # BLD AUTO: 0.4 X10E3/UL (ref 0.1–0.9)
MONOCYTES NFR BLD AUTO: 5 %
MORPHOLOGY BLD-IMP: ABNORMAL
NEUTROPHILS # BLD AUTO: 4.1 X10E3/UL (ref 1.4–7)
NEUTROPHILS NFR BLD AUTO: 54 %
PLATELET # BLD AUTO: 117 X10E3/UL (ref 150–450)
POTASSIUM SERPL-SCNC: 4.4 MMOL/L (ref 3.5–5.2)
PROT SERPL-MCNC: 7.2 G/DL (ref 6–8.5)
RBC # BLD AUTO: 3.98 X10E6/UL (ref 3.77–5.28)
SODIUM SERPL-SCNC: 140 MMOL/L (ref 134–144)
TRIGL SERPL-MCNC: 101 MG/DL (ref 0–149)
TSH SERPL DL<=0.005 MIU/L-ACNC: 0.71 UIU/ML (ref 0.45–4.5)
VLDL LARGE SERPL-SCNC: 2.6 NMOL/L
VLDL SERPL QN: 61.6 NM
WBC # BLD AUTO: 7.6 X10E3/UL (ref 3.4–10.8)

## 2024-11-18 LAB
ALDOST SERPL-MCNC: 1.9 NG/DL (ref 0–30)
ALDOST/RENIN PLAS-RTO: 2.6 {RATIO} (ref 0–30)
RENIN PLAS-CCNC: 0.74 NG/ML/HR (ref 0.17–5.38)

## 2024-11-20 ENCOUNTER — OFFICE VISIT (OUTPATIENT)
Age: 74
End: 2024-11-20
Payer: MEDICARE

## 2024-11-20 VITALS
HEART RATE: 77 BPM | BODY MASS INDEX: 28.49 KG/M2 | OXYGEN SATURATION: 97 % | SYSTOLIC BLOOD PRESSURE: 188 MMHG | WEIGHT: 171 LBS | DIASTOLIC BLOOD PRESSURE: 90 MMHG | HEIGHT: 65 IN

## 2024-11-20 DIAGNOSIS — I25.118 CORONARY ARTERY DISEASE OF NATIVE ARTERY OF NATIVE HEART WITH STABLE ANGINA PECTORIS (HCC): ICD-10-CM

## 2024-11-20 DIAGNOSIS — I10 ESSENTIAL (PRIMARY) HYPERTENSION: Primary | ICD-10-CM

## 2024-11-20 PROCEDURE — 99214 OFFICE O/P EST MOD 30 MIN: CPT | Performed by: SPECIALIST

## 2024-11-20 PROCEDURE — 3077F SYST BP >= 140 MM HG: CPT | Performed by: SPECIALIST

## 2024-11-20 PROCEDURE — 1159F MED LIST DOCD IN RCRD: CPT | Performed by: SPECIALIST

## 2024-11-20 PROCEDURE — 1123F ACP DISCUSS/DSCN MKR DOCD: CPT | Performed by: SPECIALIST

## 2024-11-20 PROCEDURE — 3080F DIAST BP >= 90 MM HG: CPT | Performed by: SPECIALIST

## 2024-11-20 RX ORDER — AMLODIPINE BESYLATE 10 MG/1
10 TABLET ORAL DAILY
Qty: 90 TABLET | Refills: 3 | Status: SHIPPED | OUTPATIENT
Start: 2024-11-20

## 2024-11-20 RX ORDER — HYDROCHLOROTHIAZIDE 25 MG/1
25 TABLET ORAL EVERY MORNING
Qty: 90 TABLET | Refills: 5 | Status: SHIPPED | OUTPATIENT
Start: 2024-11-20

## 2024-11-20 NOTE — PROGRESS NOTES
Patient: Renee Draper  : 1950    Primary Cardiologist: Regi Bateman MD. [facility]  Last Office Visit:     Today's Date: 2024    HISTORY OF PRESENT ILLNESS:     History of Present Illness:  Some SOB at nights.   's at home.       PAST MEDICAL HISTORY:     Past Medical History:   Diagnosis Date    Arthritis     Asthma     in 20s - no longer a problem    CAD (coronary artery disease)     NSTEMI with PCI (SARAI)  to RCA 17     CKD (chronic kidney disease) stage 3, GFR 30-59 ml/min (HCC)     Depression     Dyslipidemia     H/O heart artery stent     x1    Hypertension     Ill-defined condition     Hepatitis C    Knee injury     Liver disease     hep c--had treatment, states no longer present    Nausea & vomiting     Seizures (HCC)     Smoker        Past Surgical History:   Procedure Laterality Date    COLONOSCOPY N/A 2019    COLONOSCOPY performed by Aki Brown MD at [facility] ENDOSCOPY    COLONOSCOPY      CORONARY ANGIOPLASTY WITH STENT PLACEMENT      x1    ORTHOPEDIC SURGERY Bilateral     foot surgery    VASCULAR SURGERY      left leg varicose vein stripping       CURRENT MEDICATIONS:      Current Outpatient Medications   Medication Sig Dispense Refill    amLODIPine (NORVASC) 5 MG tablet Take 1 tablet by mouth daily 90 tablet 3    nebivolol (BYSTOLIC) 20 MG TABS tablet Take 1 tablet by mouth daily 90 tablet 3    ezetimibe (ZETIA) 10 MG tablet Take 1 tablet by mouth daily 90 tablet 3    atorvastatin (LIPITOR) 40 MG tablet TAKE 1 TABLET EVERY DAY AT 5 PM 90 tablet 3    buPROPion (WELLBUTRIN XL) 300 MG extended release tablet Take 1 tablet by mouth every morning 90 tablet 3    aspirin 81 MG chewable tablet Take by mouth daily      Cholecalciferol 50 MCG ( UT) TABS Take by mouth      LINZESS 145 MCG capsule TAKE 1 CAPSULE EVERY DAY BEFORE BREAKFAST (Patient not taking: Reported on 2024) 90 capsule 1     No current facility-administered medications for this visit.

## 2024-11-20 NOTE — PROGRESS NOTES
Chief Complaint   Patient presents with    Hyperlipidemia    Hypertension     Vitals:    11/20/24 1159 11/20/24 1205   BP: (!) 160/90 (!) 188/90   Site: Left Upper Arm Left Upper Arm   Position: Sitting Sitting   Pulse: 77    SpO2: 97%    Weight: 77.6 kg (171 lb)    Height: 1.651 m (5' 5\")      Chest pain: DENIED     Recent hospital stays: DENIED     Refills: DENIED

## 2024-12-11 ENCOUNTER — TELEMEDICINE (OUTPATIENT)
Facility: CLINIC | Age: 74
End: 2024-12-11
Payer: MEDICARE

## 2024-12-11 DIAGNOSIS — M79.671 RIGHT FOOT PAIN: ICD-10-CM

## 2024-12-11 DIAGNOSIS — M54.50 CHRONIC BILATERAL LOW BACK PAIN WITHOUT SCIATICA: ICD-10-CM

## 2024-12-11 DIAGNOSIS — G89.29 CHRONIC BILATERAL LOW BACK PAIN WITHOUT SCIATICA: ICD-10-CM

## 2024-12-11 DIAGNOSIS — L65.9 HAIR LOSS: Primary | ICD-10-CM

## 2024-12-11 PROCEDURE — 99214 OFFICE O/P EST MOD 30 MIN: CPT | Performed by: NURSE PRACTITIONER

## 2024-12-11 PROCEDURE — 1159F MED LIST DOCD IN RCRD: CPT | Performed by: NURSE PRACTITIONER

## 2024-12-11 PROCEDURE — 1123F ACP DISCUSS/DSCN MKR DOCD: CPT | Performed by: NURSE PRACTITIONER

## 2024-12-11 SDOH — ECONOMIC STABILITY: FOOD INSECURITY: WITHIN THE PAST 12 MONTHS, YOU WORRIED THAT YOUR FOOD WOULD RUN OUT BEFORE YOU GOT MONEY TO BUY MORE.: NEVER TRUE

## 2024-12-11 SDOH — ECONOMIC STABILITY: INCOME INSECURITY: HOW HARD IS IT FOR YOU TO PAY FOR THE VERY BASICS LIKE FOOD, HOUSING, MEDICAL CARE, AND HEATING?: NOT HARD AT ALL

## 2024-12-11 SDOH — ECONOMIC STABILITY: FOOD INSECURITY: WITHIN THE PAST 12 MONTHS, THE FOOD YOU BOUGHT JUST DIDN'T LAST AND YOU DIDN'T HAVE MONEY TO GET MORE.: NEVER TRUE

## 2024-12-11 NOTE — PROGRESS NOTES
Renee Draper (:  1950) is a 74 y.o. female, Established patient, here for evaluation of the following chief complaint(s):  Other (Referral for hair loss and back pain)         Assessment & Plan  1. Alopecia.  A referral to [facility] has been initiated for further evaluation and management of her alopecia. She has been provided with the contact information and instructed to schedule an appointment.    2. Foot pain.  She has [facility] for further evaluation and management of her foot pain. She has been provided with the contact information and instructed to schedule an appointment.    3. Back pain.  A referral to [facility] has been initiated for a comprehensive assessment and development of a treatment plan to strengthen her back. She has been provided with the contact information and instructed to schedule an appointment.    PROCEDURE  The patient had knee surgery in the past.    Results    1. Hair loss  -     [facility] - Lexi Márquez MD, Dermatology, [place]  2. Chronic bilateral low back pain without sciatica  -     External Referral To Physical Therapy  3. Right foot pain    No follow-ups on file.       Subjective   History of Present Illness  The patient presents via virtual visit for evaluation of hair loss, foot pain, and back pain.    She has been experiencing hair loss, which she attributes to potential stress factors or inadequate conditioning due to her busy schedule. She also mentions that her hair was previously thin and soft, and the new hair color she has been using may have contributed to the hair loss. She is considering a consultation with a dermatologist to further investigate the cause of her hair loss. She has not sought dermatological advice in the past.    She has been experiencing foot pain, which was previously evaluated by Dr. Christopher Liu in

## 2024-12-11 NOTE — PROGRESS NOTES
Chief Complaint   Patient presents with    Other     Referral for hair loss and back pain     Patient presents virtually today for referrals for back pain and hair loss  Patient stated she would like a referral for hair loss.   Patient stated her back has been hurting for a while.  Patient stated she needs a new referral for a foot doctor.     \"Have you been to the ER, urgent care clinic since your last visit?  Hospitalized since your last visit?\"    NO    “Have you seen or consulted any other health care providers outside our system since your last visit?”    NO

## 2024-12-16 ENCOUNTER — CLINICAL DOCUMENTATION (OUTPATIENT)
Facility: CLINIC | Age: 74
End: 2024-12-16

## 2024-12-16 NOTE — PROGRESS NOTES
[facility] on MACIE Chowdhury's desk to process

## 2025-01-14 ENCOUNTER — ANCILLARY PROCEDURE (OUTPATIENT)
Age: 75
End: 2025-01-14
Payer: MEDICARE

## 2025-01-14 DIAGNOSIS — I10 ESSENTIAL (PRIMARY) HYPERTENSION: ICD-10-CM

## 2025-01-14 DIAGNOSIS — I25.118 CORONARY ARTERY DISEASE OF NATIVE ARTERY OF NATIVE HEART WITH STABLE ANGINA PECTORIS (HCC): ICD-10-CM

## 2025-01-14 LAB
VAS AORTA AT RENAL ARTERIES PSV: 97 CM/S
VAS AORTA DIST AP: 2.62 CM
VAS AORTA DIST TR: 2.66 CM
VAS AORTA MID AP: 1.67 CM
VAS AORTA MID TRANS: 1.8 CM
VAS AORTA PROX AP: 1.84 CM
VAS AORTA PROX TR: 1.92 CM
VAS LEFT KIDNEY LENGTH: 9.49 CM
VAS LEFT KIDNEY WIDTH: 4.33 CM
VAS LEFT RENAL DIST PSV: 77 CM/S
VAS LEFT RENAL MID PSV: 109.1 CM/S
VAS LEFT RENAL ORIGIN PSV: 172.7 CM/S
VAS LEFT RENAL PROX PSV: 152.8 CM/S
VAS RIGHT KIDNEY LENGTH: 9.06 CM
VAS RIGHT KIDNEY WIDTH: 3.73 CM
VAS RIGHT RENAL DIST PSV: 106.6 CM/S
VAS RIGHT RENAL MID PSV: 174 CM/S
VAS RIGHT RENAL ORIGIN PSV: 85.4 CM/S
VAS RIGHT RENAL PROX PSV: 80.4 CM/S

## 2025-01-14 PROCEDURE — 93975 VASCULAR STUDY: CPT | Performed by: SPECIALIST

## 2025-01-23 LAB
BUN SERPL-MCNC: 21 MG/DL (ref 8–27)
BUN/CREAT SERPL: 18 (ref 12–28)
CALCIUM SERPL-MCNC: 9.6 MG/DL (ref 8.7–10.3)
CHLORIDE SERPL-SCNC: 105 MMOL/L (ref 96–106)
CO2 SERPL-SCNC: 22 MMOL/L (ref 20–29)
CREAT SERPL-MCNC: 1.15 MG/DL (ref 0.57–1)
EGFRCR SERPLBLD CKD-EPI 2021: 50 ML/MIN/1.73
GLUCOSE SERPL-MCNC: 92 MG/DL (ref 70–99)
MAGNESIUM SERPL-MCNC: 2 MG/DL (ref 1.6–2.3)
POTASSIUM SERPL-SCNC: 4.8 MMOL/L (ref 3.5–5.2)
SODIUM SERPL-SCNC: 142 MMOL/L (ref 134–144)

## 2025-01-25 LAB
METANEPH FREE SERPL-MCNC: 39.2 PG/ML (ref 0–88)
NORMETANEPHRINE SERPL-MCNC: 170.4 PG/ML (ref 0–285.2)

## 2025-02-12 ENCOUNTER — TELEPHONE (OUTPATIENT)
Facility: CLINIC | Age: 75
End: 2025-02-12

## 2025-02-12 RX ORDER — ALBUTEROL SULFATE 90 UG/1
2 INHALANT RESPIRATORY (INHALATION) 4 TIMES DAILY PRN
Qty: 3 EACH | Refills: 3 | Status: SHIPPED | OUTPATIENT
Start: 2025-02-12

## 2025-02-12 RX ORDER — BUPROPION HYDROCHLORIDE 300 MG/1
300 TABLET ORAL EVERY MORNING
Qty: 90 TABLET | Refills: 3 | Status: SHIPPED | OUTPATIENT
Start: 2025-02-12

## 2025-02-12 NOTE — TELEPHONE ENCOUNTER
We received a fax refill request for Renee Draper.  Please escribe LINZESS 145 MCG capsule  to their pharmacy.  The pharmacy is correct in the chart and they are requesting a 90 day supply.      We received a fax refill request for Renee Draper.  Please escribe buPROPion (WELLBUTRIN XL) 300 MG extended release tablet  to their pharmacy.  The pharmacy is correct in the chart and they are requesting a 90 day supply.      We received a fax refill request for Renee Draper.  Please escribe Albuterol sulfate hfa hfa (pv)aer to their pharmacy.  The pharmacy is correct in the chart and they are requesting a 30 day supply.

## 2025-02-18 DIAGNOSIS — I10 ESSENTIAL (PRIMARY) HYPERTENSION: ICD-10-CM

## 2025-02-18 RX ORDER — ATORVASTATIN CALCIUM 40 MG/1
TABLET, FILM COATED ORAL
Qty: 90 TABLET | Refills: 1 | Status: SHIPPED | OUTPATIENT
Start: 2025-02-18

## 2025-02-18 RX ORDER — AMLODIPINE BESYLATE 10 MG/1
10 TABLET ORAL DAILY
Qty: 90 TABLET | Refills: 1 | Status: SHIPPED | OUTPATIENT
Start: 2025-02-18

## 2025-02-18 RX ORDER — EZETIMIBE 10 MG/1
10 TABLET ORAL DAILY
Qty: 90 TABLET | Refills: 1 | Status: SHIPPED | OUTPATIENT
Start: 2025-02-18

## 2025-02-18 RX ORDER — HYDROCHLOROTHIAZIDE 25 MG/1
25 TABLET ORAL EVERY MORNING
Qty: 90 TABLET | Refills: 1 | Status: SHIPPED | OUTPATIENT
Start: 2025-02-18

## 2025-02-18 NOTE — TELEPHONE ENCOUNTER
Refill per VO of Dr. Bateman  Last appt: 11/20/2024    NV needed x1 month   [pt cx apt 3/6/25]    Future Appointments   Date Time Provider Department Center   2/24/2025  1:40 PM Kera Castellon PSYD [facility] AMB       Requested Prescriptions     Signed Prescriptions Disp Refills    ezetimibe (ZETIA) 10 MG tablet 90 tablet 1     Sig: Take 1 tablet by mouth daily     Authorizing Provider: OLIVA BATEMAN     Ordering User: FOREIGN WONG    hydroCHLOROthiazide (HYDRODIURIL) 25 MG tablet 90 tablet 1     Sig: Take 1 tablet by mouth every morning     Authorizing Provider: OLIVA BATEMAN     Ordering User: FOREIGN WONG    amLODIPine (NORVASC) 10 MG tablet 90 tablet 1     Sig: Take 1 tablet by mouth daily     Authorizing Provider: OLIVA BATEMAN     Ordering User: FOREIGN WONG    atorvastatin (LIPITOR) 40 MG tablet 90 tablet 1     Sig: TAKE 1 TABLET EVERY DAY AT 5 PM     Authorizing Provider: OLIVA BATEMAN     Ordering User: FOREIGN WONG

## 2025-02-24 ENCOUNTER — OFFICE VISIT (OUTPATIENT)
Age: 75
End: 2025-02-24
Payer: MEDICARE

## 2025-02-24 DIAGNOSIS — F32.1 MODERATE MAJOR DEPRESSION (HCC): ICD-10-CM

## 2025-02-24 DIAGNOSIS — G31.84 MILD COGNITIVE IMPAIRMENT: Primary | ICD-10-CM

## 2025-02-24 DIAGNOSIS — R41.89 COGNITIVE DECLINE: ICD-10-CM

## 2025-02-24 PROCEDURE — 90791 PSYCH DIAGNOSTIC EVALUATION: CPT | Performed by: CLINICAL NEUROPSYCHOLOGIST

## 2025-02-24 NOTE — PROGRESS NOTES
[facility]  NEUROLOGY CLINIC   [address]   [place] [address]   [phone] Office   [phone] Fax      Neuropsychology    Initial Diagnostic Interview Note      Referral:  Gertrude Chowdhury, APRN - NP    Renee Draper is a 74 y.o. right handed  female who was unaccompanied to the initial clinical interview on 2/24/2025.  Please refer to her medical records for details pertaining to her history.   At the start of the appointment, I reviewed the patient's [facility] Epic Chart (including Media scanned in from previous providers) for the active Problem List, all pertinent Past Medical Hx, medications, recent radiologic and laboratory findings.  In addition, I reviewed pt's documented Immunization Record and Encounter History.     Chief Complaint: New patient, establish care, for neurocognitive and psychologic concerns, as outlined above.     She completed one year of college without history of previously diagnosed LD and/or receipt of special education services.  She lost her son and to-be  before covid and didn't leave the house for three years and then covid hit.   The patient  has a past medical history of Arthritis, Asthma, CAD (coronary artery disease), CKD (chronic kidney disease) stage 3, GFR 30-59 ml/min ([facility]), Depression, Dyslipidemia, H/O heart artery stent, Hypertension, Ill-defined condition, Knee injury, Liver disease, Nausea & vomiting, Seizures ([facility]), and Smoker.    The patient  has a past surgical history that includes Colonoscopy (N/A, 1/14/2019); Colonoscopy (2013); Coronary angioplasty with stent; vascular surgery (); and orthopedic surgery (Bilateral).    She works as a  and she drives without issues. Since she came out the house, her memory has gotten significantly worse.  She will lose her train her thought.  Loses words.  Loses track of things.  She

## 2025-03-17 ENCOUNTER — TELEPHONE (OUTPATIENT)
Age: 75
End: 2025-03-17

## 2025-03-17 NOTE — TELEPHONE ENCOUNTER
[facility]   Reference Number  [id number]

## 2025-04-07 ENCOUNTER — PROCEDURE VISIT (OUTPATIENT)
Age: 75
End: 2025-04-07
Payer: MEDICARE

## 2025-04-07 DIAGNOSIS — F43.10 PTSD (POST-TRAUMATIC STRESS DISORDER): ICD-10-CM

## 2025-04-07 DIAGNOSIS — F90.0 ADHD (ATTENTION DEFICIT HYPERACTIVITY DISORDER), INATTENTIVE TYPE: Chronic | ICD-10-CM

## 2025-04-07 DIAGNOSIS — F32.1 MODERATE MAJOR DEPRESSION (HCC): ICD-10-CM

## 2025-04-07 DIAGNOSIS — G31.84 MILD COGNITIVE IMPAIRMENT: Primary | ICD-10-CM

## 2025-04-07 PROCEDURE — 96139 PSYCL/NRPSYC TST TECH EA: CPT | Performed by: CLINICAL NEUROPSYCHOLOGIST

## 2025-04-07 PROCEDURE — 96133 NRPSYC TST EVAL PHYS/QHP EA: CPT | Performed by: CLINICAL NEUROPSYCHOLOGIST

## 2025-04-07 PROCEDURE — 96138 PSYCL/NRPSYC TECH 1ST: CPT | Performed by: CLINICAL NEUROPSYCHOLOGIST

## 2025-04-07 PROCEDURE — 96132 NRPSYC TST EVAL PHYS/QHP 1ST: CPT | Performed by: CLINICAL NEUROPSYCHOLOGIST

## 2025-04-09 NOTE — PROGRESS NOTES
recognition software.  Quite often unanticipated grammatical, syntax, homophones, and other interpretive errors are inadvertently transcribed by the computer software.  Please disregard these errors.  Please excuse any errors that have escaped final proofreading.  Thank you.

## 2025-05-06 ENCOUNTER — OFFICE VISIT (OUTPATIENT)
Age: 75
End: 2025-05-06
Payer: MEDICARE

## 2025-05-06 DIAGNOSIS — F90.0 ADHD (ATTENTION DEFICIT HYPERACTIVITY DISORDER), INATTENTIVE TYPE: Chronic | ICD-10-CM

## 2025-05-06 DIAGNOSIS — F32.1 MODERATE MAJOR DEPRESSION (HCC): ICD-10-CM

## 2025-05-06 DIAGNOSIS — F43.10 PTSD (POST-TRAUMATIC STRESS DISORDER): ICD-10-CM

## 2025-05-06 DIAGNOSIS — G31.84 MILD COGNITIVE IMPAIRMENT: Primary | ICD-10-CM

## 2025-05-06 PROCEDURE — 96132 NRPSYC TST EVAL PHYS/QHP 1ST: CPT | Performed by: CLINICAL NEUROPSYCHOLOGIST

## 2025-05-06 NOTE — PROGRESS NOTES
Chief Complaint:  Follow up to discuss test results with this established patient related to neurocognitive and psychologic functioning, cognitive concerns and emotional/mood concerns as outlined below.     A neuropsychological evaluation was completed with the patient on 4/7/2025     Prior to seeing the patient for today's visit, I reviewed pertinent records, including the previously completed report, the records in Epic, and any updated visits from other providers since the patient's last visit.     During today's appointment I administered the following measures: NMSE, Fluency.  Exam normal          I provided feedback services related to the previously completed report. Attendees included: Patient. Education was provided regarding my diagnostic impressions, and we discussed treatment plan/options. Attendees were provided with the opportunity to ask questions, which were answered to the best of my ability.     We discussed, in detail, the following:    This patient generated a predominantly normal range Neuropsychological Evaluation with respect to neurocognitive functioning.  In this regard, the only impairment noted was for sustained visual attention.  Otherwise, in general, her performances across all other neurocognitive domains assessed are normal.  There is a functional quality to this test performance.  Indeed, she is reporting moderate levels of depression.  She reports that her use of alcohol has been problematic for her in the past.  There are strong support for PTSD.                   My opinion, this profile is not consistent with organic residua from traumatic brain injury/concussion.  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  I suggest consideration for psychiatric treatment for depression as well as trauma-informed therapy for anxiety/PTSD.  Also recommend consideration for appropriate medication for attention if this is not medically contraindicated, though

## (undated) DEVICE — Device

## (undated) DEVICE — CANN NASAL O2 CAPNOGRAPHY AD -- FILTERLINE

## (undated) DEVICE — 1200 GUARD II KIT W/5MM TUBE W/O VAC TUBE: Brand: GUARDIAN

## (undated) DEVICE — SOLIDIFIER MEDC 1200ML -- CONVERT TO 356117

## (undated) DEVICE — SET GRAV CK VLV NEEDLESS ST 3 GANGED 4WAY STPCOCK HI FLO 10

## (undated) DEVICE — KENDALL RADIOLUCENT FOAM MONITORING ELECTRODE -RECTANGULAR SHAPE: Brand: KENDALL

## (undated) DEVICE — CATH IV AUTOGRD BC BLU 22GA 25 -- INSYTE

## (undated) DEVICE — ADULT SPO2 SENSOR: Brand: NELLCOR

## (undated) DEVICE — SIMPLICITY FLUFF UNDERPAD 23X36, MODERATE: Brand: SIMPLICITY

## (undated) DEVICE — 3M™ CUROS™ DISINFECTING CAP FOR NEEDLELESS CONNECTORS 270/CARTON 20 CARTONS/CASE CFF1-270: Brand: CUROS™

## (undated) DEVICE — KIT COLON W/ 1.1OZ LUB AND 2 END

## (undated) DEVICE — BAG BELONG PT PERS CLEAR HANDL